# Patient Record
Sex: MALE | Race: WHITE | NOT HISPANIC OR LATINO | Employment: FULL TIME | URBAN - METROPOLITAN AREA
[De-identification: names, ages, dates, MRNs, and addresses within clinical notes are randomized per-mention and may not be internally consistent; named-entity substitution may affect disease eponyms.]

---

## 2017-02-28 ENCOUNTER — COMMUNICATION - HEALTHEAST (OUTPATIENT)
Dept: FAMILY MEDICINE | Facility: CLINIC | Age: 37
End: 2017-02-28

## 2017-03-29 ENCOUNTER — RECORDS - HEALTHEAST (OUTPATIENT)
Dept: ADMINISTRATIVE | Facility: OTHER | Age: 37
End: 2017-03-29

## 2017-05-10 ENCOUNTER — COMMUNICATION - HEALTHEAST (OUTPATIENT)
Dept: FAMILY MEDICINE | Facility: CLINIC | Age: 37
End: 2017-05-10

## 2017-06-14 ENCOUNTER — OFFICE VISIT - HEALTHEAST (OUTPATIENT)
Dept: FAMILY MEDICINE | Facility: CLINIC | Age: 37
End: 2017-06-14

## 2017-06-14 DIAGNOSIS — Z72.0 TOBACCO ABUSE: ICD-10-CM

## 2017-06-14 DIAGNOSIS — F10.11 ALCOHOL ABUSE, IN REMISSION: ICD-10-CM

## 2017-06-14 DIAGNOSIS — I10 ESSENTIAL HYPERTENSION WITH GOAL BLOOD PRESSURE LESS THAN 130/80: ICD-10-CM

## 2017-06-14 DIAGNOSIS — R73.03 PREDIABETES: ICD-10-CM

## 2017-06-14 DIAGNOSIS — E78.00 HYPERCHOLESTEROLEMIA: ICD-10-CM

## 2017-06-14 LAB
CHOLEST SERPL-MCNC: 182 MG/DL
FASTING STATUS PATIENT QL REPORTED: YES
HBA1C MFR BLD: 6.6 % (ref 3.5–6)
HDLC SERPL-MCNC: 37 MG/DL
LDLC SERPL CALC-MCNC: 106 MG/DL
TRIGL SERPL-MCNC: 193 MG/DL

## 2017-06-14 NOTE — ASSESSMENT & PLAN NOTE
New diagnosis since the last time the patient was in clinic.  He is being maintained on Suboxone with good effect.  The mental health component is being handled through weekly counseling and regular attendance at a men's group.

## 2017-06-14 NOTE — ASSESSMENT & PLAN NOTE
The patient declined treatment 19 months ago.  We will recheck his cholesterol levels today as he has not been drinking alcohol for the past 6 months.  Anticipate improvement but I suspect he may have developed diabetes and will still benefit from a cholesterol-lowering medication.

## 2017-06-14 NOTE — ASSESSMENT & PLAN NOTE
Consistently elevated with blood pressurechecks in multiple settings including clinics other than her own.  We will initiate therapy with 10 mg of lisinopril today.  The patient will return for a blood pressure check with nursing staff in 2 weeks.  He will return to see me in 5-6 weeks to talk about side effects and further titration of blood pressure medications.

## 2017-06-14 NOTE — ASSESSMENT & PLAN NOTE
The patient has not been drinking any alcohol for the past 6 months.  We will check a hemoglobin A1c today.

## 2017-06-19 ENCOUNTER — COMMUNICATION - HEALTHEAST (OUTPATIENT)
Dept: FAMILY MEDICINE | Facility: CLINIC | Age: 37
End: 2017-06-19

## 2017-06-19 DIAGNOSIS — R73.03 PREDIABETES: ICD-10-CM

## 2017-06-19 DIAGNOSIS — R73.09 ELEVATED HEMOGLOBIN A1C: ICD-10-CM

## 2017-06-28 ENCOUNTER — COMMUNICATION - HEALTHEAST (OUTPATIENT)
Dept: FAMILY MEDICINE | Facility: CLINIC | Age: 37
End: 2017-06-28

## 2017-06-28 ENCOUNTER — AMBULATORY - HEALTHEAST (OUTPATIENT)
Dept: NURSING | Facility: CLINIC | Age: 37
End: 2017-06-28

## 2017-06-28 DIAGNOSIS — R73.03 PREDIABETES: ICD-10-CM

## 2017-07-25 ENCOUNTER — COMMUNICATION - HEALTHEAST (OUTPATIENT)
Dept: FAMILY MEDICINE | Facility: CLINIC | Age: 37
End: 2017-07-25

## 2017-08-14 ENCOUNTER — COMMUNICATION - HEALTHEAST (OUTPATIENT)
Dept: FAMILY MEDICINE | Facility: CLINIC | Age: 37
End: 2017-08-14

## 2017-08-14 DIAGNOSIS — I10 ESSENTIAL HYPERTENSION WITH GOAL BLOOD PRESSURE LESS THAN 130/80: ICD-10-CM

## 2017-09-25 ENCOUNTER — COMMUNICATION - HEALTHEAST (OUTPATIENT)
Dept: FAMILY MEDICINE | Facility: CLINIC | Age: 37
End: 2017-09-25

## 2017-10-14 ENCOUNTER — COMMUNICATION - HEALTHEAST (OUTPATIENT)
Dept: FAMILY MEDICINE | Facility: CLINIC | Age: 37
End: 2017-10-14

## 2017-10-14 DIAGNOSIS — I10 ESSENTIAL HYPERTENSION WITH GOAL BLOOD PRESSURE LESS THAN 130/80: ICD-10-CM

## 2017-10-17 ENCOUNTER — COMMUNICATION - HEALTHEAST (OUTPATIENT)
Dept: FAMILY MEDICINE | Facility: CLINIC | Age: 37
End: 2017-10-17

## 2017-11-02 ENCOUNTER — COMMUNICATION - HEALTHEAST (OUTPATIENT)
Dept: FAMILY MEDICINE | Facility: CLINIC | Age: 37
End: 2017-11-02

## 2017-11-02 ENCOUNTER — OFFICE VISIT - HEALTHEAST (OUTPATIENT)
Dept: FAMILY MEDICINE | Facility: CLINIC | Age: 37
End: 2017-11-02

## 2017-11-02 DIAGNOSIS — E78.00 HYPERCHOLESTEROLEMIA: ICD-10-CM

## 2017-11-02 DIAGNOSIS — Z23 NEED FOR INFLUENZA VACCINATION: ICD-10-CM

## 2017-11-02 DIAGNOSIS — R73.03 PREDIABETES: ICD-10-CM

## 2017-11-02 DIAGNOSIS — I10 ESSENTIAL HYPERTENSION: ICD-10-CM

## 2017-11-02 DIAGNOSIS — Z72.0 TOBACCO ABUSE: ICD-10-CM

## 2017-11-02 DIAGNOSIS — E88.810 METABOLIC SYNDROME: ICD-10-CM

## 2017-11-02 LAB — HBA1C MFR BLD: 5.5 % (ref 3.5–6)

## 2017-11-02 NOTE — ASSESSMENT & PLAN NOTE
The patient will return to clinic in 6 months for a wellness exam and recheck of laboratory tests.  We will risk stratify him and make recommendation for cholesterol-lowering medications at that time.

## 2017-11-02 NOTE — ASSESSMENT & PLAN NOTE
Dramatic improvement with lifestyle changes in the past 4 months.  He will continue to work at weight loss.

## 2017-11-02 NOTE — ASSESSMENT & PLAN NOTE
This patient has made dramatically still changes.  I do believe that he does fit criteria for metabolic syndrome and would benefit from metformin as well as stained lifestyle changes.  -Start metformin 500 mg.  -Repeat of labs in 6 months.

## 2017-11-02 NOTE — ASSESSMENT & PLAN NOTE
Hypertension is improving with treatment.  Continue current treatment regimen.  Blood pressure will be reassessed at the next regular appointment.  BMP today.

## 2017-11-02 NOTE — ASSESSMENT & PLAN NOTE
The patient continues to chew.  We discussed that this is not advisable.  He will work on this in 2018 as he believes he is quick enough illicit substances in 2017.

## 2018-01-15 ENCOUNTER — COMMUNICATION - HEALTHEAST (OUTPATIENT)
Dept: SCHEDULING | Facility: CLINIC | Age: 38
End: 2018-01-15

## 2018-05-04 ENCOUNTER — COMMUNICATION - HEALTHEAST (OUTPATIENT)
Dept: FAMILY MEDICINE | Facility: CLINIC | Age: 38
End: 2018-05-04

## 2018-05-14 ENCOUNTER — COMMUNICATION - HEALTHEAST (OUTPATIENT)
Dept: FAMILY MEDICINE | Facility: CLINIC | Age: 38
End: 2018-05-14

## 2018-05-14 DIAGNOSIS — R73.03 PREDIABETES: ICD-10-CM

## 2018-05-18 ENCOUNTER — OFFICE VISIT - HEALTHEAST (OUTPATIENT)
Dept: FAMILY MEDICINE | Facility: CLINIC | Age: 38
End: 2018-05-18

## 2018-05-18 DIAGNOSIS — E78.00 HYPERCHOLESTEROLEMIA: ICD-10-CM

## 2018-05-18 DIAGNOSIS — R53.83 FATIGUE: ICD-10-CM

## 2018-05-18 DIAGNOSIS — E55.9 AVITAMINOSIS D: ICD-10-CM

## 2018-05-18 DIAGNOSIS — R73.03 PREDIABETES: ICD-10-CM

## 2018-05-18 DIAGNOSIS — I10 ESSENTIAL HYPERTENSION: ICD-10-CM

## 2018-05-18 DIAGNOSIS — E88.810 METABOLIC SYNDROME: ICD-10-CM

## 2018-05-18 LAB
ANION GAP SERPL CALCULATED.3IONS-SCNC: 12 MMOL/L (ref 5–18)
BUN SERPL-MCNC: 12 MG/DL (ref 8–22)
CALCIUM SERPL-MCNC: 9.5 MG/DL (ref 8.5–10.5)
CHLORIDE BLD-SCNC: 105 MMOL/L (ref 98–107)
CHOLEST SERPL-MCNC: 152 MG/DL
CO2 SERPL-SCNC: 22 MMOL/L (ref 22–31)
CREAT SERPL-MCNC: 0.91 MG/DL (ref 0.7–1.3)
FASTING STATUS PATIENT QL REPORTED: ABNORMAL
GFR SERPL CREATININE-BSD FRML MDRD: >60 ML/MIN/1.73M2
GLUCOSE BLD-MCNC: 117 MG/DL (ref 70–125)
HBA1C MFR BLD: 6.4 % (ref 3.5–6)
HDLC SERPL-MCNC: 34 MG/DL
HGB BLD-MCNC: 14 G/DL (ref 14–18)
LDLC SERPL CALC-MCNC: 93 MG/DL
POTASSIUM BLD-SCNC: 4.5 MMOL/L (ref 3.5–5)
SODIUM SERPL-SCNC: 139 MMOL/L (ref 136–145)
TRIGL SERPL-MCNC: 126 MG/DL

## 2018-05-18 NOTE — ASSESSMENT & PLAN NOTE
Elevated blood pressure checks at his Suboxone clinic.  -Increase lisinopril to 20 mg.  -Check basic metabolic panel.

## 2018-05-18 NOTE — ASSESSMENT & PLAN NOTE
Patient here for diabetic check.  He is fasting today and has been possibly 1 year since his last measurement.  Check lipids today.

## 2018-05-18 NOTE — ASSESSMENT & PLAN NOTE
This patient has prediabetes.  His hemoglobin A1c is unchanged than previous measurements.     - continue medications: oral agent (monotherapy): metformin (generic)   - medications discontinued: none   - aspirin: NO   - blood pressure control: Good   - statin: NO and not indicated given age.   - tobacco use: NO   - physical activity: discussed improving his level of activity   - DM follow-up: 3 months for lab only visit  Follow-up in clinic in 6 months.

## 2018-05-21 LAB
25(OH)D3 SERPL-MCNC: 25.3 NG/ML (ref 30–80)
25(OH)D3 SERPL-MCNC: 25.3 NG/ML (ref 30–80)

## 2018-05-30 ENCOUNTER — OFFICE VISIT - HEALTHEAST (OUTPATIENT)
Dept: OTOLARYNGOLOGY | Facility: CLINIC | Age: 38
End: 2018-05-30

## 2018-05-30 DIAGNOSIS — H65.23 CHRONIC SEROUS OTITIS MEDIA OF BOTH EARS: ICD-10-CM

## 2018-07-30 ENCOUNTER — OFFICE VISIT - HEALTHEAST (OUTPATIENT)
Dept: OTOLARYNGOLOGY | Facility: CLINIC | Age: 38
End: 2018-07-30

## 2018-07-30 DIAGNOSIS — Z45.89 TYMPANOSTOMY TUBE CHECK: ICD-10-CM

## 2018-07-30 DIAGNOSIS — H69.93 EUSTACHIAN TUBE DYSFUNCTION, BILATERAL: ICD-10-CM

## 2018-08-21 ENCOUNTER — COMMUNICATION - HEALTHEAST (OUTPATIENT)
Dept: FAMILY MEDICINE | Facility: CLINIC | Age: 38
End: 2018-08-21

## 2018-11-22 ENCOUNTER — COMMUNICATION - HEALTHEAST (OUTPATIENT)
Dept: FAMILY MEDICINE | Facility: CLINIC | Age: 38
End: 2018-11-22

## 2018-11-24 ENCOUNTER — COMMUNICATION - HEALTHEAST (OUTPATIENT)
Dept: FAMILY MEDICINE | Facility: CLINIC | Age: 38
End: 2018-11-24

## 2018-11-24 DIAGNOSIS — I10 ESSENTIAL HYPERTENSION: ICD-10-CM

## 2018-11-29 ENCOUNTER — COMMUNICATION - HEALTHEAST (OUTPATIENT)
Dept: FAMILY MEDICINE | Facility: CLINIC | Age: 38
End: 2018-11-29

## 2018-11-29 DIAGNOSIS — R73.03 PREDIABETES: ICD-10-CM

## 2019-02-23 ENCOUNTER — COMMUNICATION - HEALTHEAST (OUTPATIENT)
Dept: FAMILY MEDICINE | Facility: CLINIC | Age: 39
End: 2019-02-23

## 2019-03-16 ENCOUNTER — RECORDS - HEALTHEAST (OUTPATIENT)
Dept: ADMINISTRATIVE | Facility: OTHER | Age: 39
End: 2019-03-16

## 2019-03-25 ENCOUNTER — RECORDS - HEALTHEAST (OUTPATIENT)
Dept: ADMINISTRATIVE | Facility: OTHER | Age: 39
End: 2019-03-25

## 2019-03-29 ENCOUNTER — OFFICE VISIT - HEALTHEAST (OUTPATIENT)
Dept: FAMILY MEDICINE | Facility: CLINIC | Age: 39
End: 2019-03-29

## 2019-03-29 DIAGNOSIS — R73.03 PREDIABETES: ICD-10-CM

## 2019-03-29 DIAGNOSIS — R39.9 LOWER URINARY TRACT SYMPTOMS (LUTS): ICD-10-CM

## 2019-03-29 DIAGNOSIS — R73.09 ELEVATED HEMOGLOBIN A1C: ICD-10-CM

## 2019-03-29 DIAGNOSIS — I10 ESSENTIAL HYPERTENSION: ICD-10-CM

## 2019-03-29 LAB
ALBUMIN UR-MCNC: NEGATIVE MG/DL
APPEARANCE UR: CLEAR
BILIRUB UR QL STRIP: NEGATIVE
COLOR UR AUTO: YELLOW
GLUCOSE UR STRIP-MCNC: NEGATIVE MG/DL
HBA1C MFR BLD: 5.9 % (ref 3.5–6)
HGB UR QL STRIP: NEGATIVE
KETONES UR STRIP-MCNC: NEGATIVE MG/DL
LEUKOCYTE ESTERASE UR QL STRIP: NEGATIVE
NITRATE UR QL: NEGATIVE
PH UR STRIP: 6 [PH] (ref 5–8)
PSA SERPL-MCNC: 0.3 NG/ML (ref 0–2.5)
SP GR UR STRIP: 1.02 (ref 1–1.03)
UROBILINOGEN UR STRIP-ACNC: NORMAL

## 2019-03-29 NOTE — ASSESSMENT & PLAN NOTE
Patient continues to work towards losing weight.  Hemoglobin A1c improved in comparison to last year.  We will plan on focusing on weight loss at future visits.  Recheck in 6 months, sooner if needed.

## 2019-03-29 NOTE — ASSESSMENT & PLAN NOTE
Patient is stable with his Suboxone dosing.  He expresses a desire to decrease his dose but believes that the medication continues kartik useful.  He would be interested in transferring his Suboxone management to this clinic.  We will request records from [facility] and the patient will return to clinic in 5 weeks for a follow-up visit.  Encouraged him to follow-up with his clinic at least one more time before transferring care formally.

## 2019-05-09 ENCOUNTER — COMMUNICATION - HEALTHEAST (OUTPATIENT)
Dept: FAMILY MEDICINE | Facility: CLINIC | Age: 39
End: 2019-05-09

## 2019-05-20 ENCOUNTER — OFFICE VISIT - HEALTHEAST (OUTPATIENT)
Dept: FAMILY MEDICINE | Facility: CLINIC | Age: 39
End: 2019-05-20

## 2019-05-20 ENCOUNTER — OFFICE VISIT - HEALTHEAST (OUTPATIENT)
Dept: OTOLARYNGOLOGY | Facility: CLINIC | Age: 39
End: 2019-05-20

## 2019-05-20 DIAGNOSIS — H69.93 EUSTACHIAN TUBE DYSFUNCTION, BILATERAL: ICD-10-CM

## 2019-05-20 DIAGNOSIS — H65.21 RIGHT CHRONIC SEROUS OTITIS MEDIA: ICD-10-CM

## 2019-05-20 DIAGNOSIS — R39.9 LOWER URINARY TRACT SYMPTOMS (LUTS): ICD-10-CM

## 2019-05-20 LAB
AMPHETAMINES UR QL SCN: NORMAL
BARBITURATES UR QL: NORMAL
BENZODIAZ UR QL: NORMAL
BUPRENORPHINE QUAL URINE LHE: ABNORMAL
CANNABINOIDS UR QL SCN: NORMAL
COCAINE UR QL: NORMAL
CREAT UR-MCNC: 368.8 MG/DL
CREAT UR-MCNC: 380.5 MG/DL
OPIATES UR QL SCN: NORMAL
OXYCODONE UR QL: NORMAL
PCP UR QL SCN: NORMAL

## 2019-05-20 NOTE — ASSESSMENT & PLAN NOTE
This patient presents to discuss transfer of Suboxone prescription care from his chemical dependency treatment clinic to his primary care clinic.  He has been on a reasonable dose of buprenorphine-naloxone for the past 13 months.  The dose has been adjusted several times over the course of that time.  He has not used/abused prescription medications since being on therapy.  He is well connected with his nubia community and believes that he has an excellentsocial support.  -Continue Suboxone.  In general he takes 1 film most days with some increased depending on cravings.  We reviewed the [place] prescription monitoring database and there are no unexplained dispenses.  -Drugs of abuse screen today.  - Continue with current group therapy through discharge.  Consider formal chemical dependency referral if the patient has increased cravings.  -We will plan to complete controlled substance agreement and consent for treatment paperwork at our next visit.  - Continue treatment for prostatitis.  If he has recurrent symptoms, referral to urology will be appropriate.

## 2019-05-21 ENCOUNTER — COMMUNICATION - HEALTHEAST (OUTPATIENT)
Dept: FAMILY MEDICINE | Facility: CLINIC | Age: 39
End: 2019-05-21

## 2019-05-22 ENCOUNTER — OFFICE VISIT - HEALTHEAST (OUTPATIENT)
Dept: OTOLARYNGOLOGY | Facility: CLINIC | Age: 39
End: 2019-05-22

## 2019-05-22 DIAGNOSIS — H93.12 TINNITUS OF LEFT EAR: ICD-10-CM

## 2019-05-22 DIAGNOSIS — H69.93 EUSTACHIAN TUBE DYSFUNCTION, BILATERAL: ICD-10-CM

## 2019-05-22 DIAGNOSIS — H65.21 RIGHT CHRONIC SEROUS OTITIS MEDIA: ICD-10-CM

## 2019-06-15 ENCOUNTER — COMMUNICATION - HEALTHEAST (OUTPATIENT)
Dept: FAMILY MEDICINE | Facility: CLINIC | Age: 39
End: 2019-06-15

## 2019-06-15 DIAGNOSIS — I10 ESSENTIAL HYPERTENSION: ICD-10-CM

## 2019-06-17 ENCOUNTER — OFFICE VISIT - HEALTHEAST (OUTPATIENT)
Dept: FAMILY MEDICINE | Facility: CLINIC | Age: 39
End: 2019-06-17

## 2019-06-17 DIAGNOSIS — I10 ESSENTIAL HYPERTENSION: ICD-10-CM

## 2019-06-17 DIAGNOSIS — R73.03 PREDIABETES: ICD-10-CM

## 2019-06-17 DIAGNOSIS — R39.9 LOWER URINARY TRACT SYMPTOMS (LUTS): ICD-10-CM

## 2019-06-17 DIAGNOSIS — E88.810 METABOLIC SYNDROME: ICD-10-CM

## 2019-06-17 LAB
ALT SERPL W P-5'-P-CCNC: 20 U/L (ref 0–45)
ANION GAP SERPL CALCULATED.3IONS-SCNC: 7 MMOL/L (ref 5–18)
BUN SERPL-MCNC: 12 MG/DL (ref 8–22)
CALCIUM SERPL-MCNC: 9.7 MG/DL (ref 8.5–10.5)
CHLORIDE BLD-SCNC: 107 MMOL/L (ref 98–107)
CHOLEST SERPL-MCNC: 164 MG/DL
CO2 SERPL-SCNC: 26 MMOL/L (ref 22–31)
CREAT SERPL-MCNC: 0.9 MG/DL (ref 0.7–1.3)
FASTING STATUS PATIENT QL REPORTED: YES
GFR SERPL CREATININE-BSD FRML MDRD: >60 ML/MIN/1.73M2
GLUCOSE BLD-MCNC: 107 MG/DL (ref 70–125)
HDLC SERPL-MCNC: 44 MG/DL
LDLC SERPL CALC-MCNC: 97 MG/DL
POTASSIUM BLD-SCNC: 4.3 MMOL/L (ref 3.5–5)
SODIUM SERPL-SCNC: 140 MMOL/L (ref 136–145)
TRIGL SERPL-MCNC: 116 MG/DL

## 2019-06-17 ASSESSMENT — MIFFLIN-ST. JEOR: SCORE: 1821.39

## 2019-06-17 NOTE — ASSESSMENT & PLAN NOTE
Currently stable and well maintained on buprenorphine-naloxone.  -Continue Suboxone.  -There is some question of the cost of the urine drug screens.  He is going to check with his insurance company.  Given the stability of his clinical course, will defer urine drug screen this week pending better understanding of the cost to the patient.  -Follow-up in 1 month.

## 2019-06-17 NOTE — ASSESSMENT & PLAN NOTE
The patient has been struggling somewhat in recent weeks/months to maintain his previous discipline with his meal plan.  We discussedstrategies for resuming healthier approach to eating.

## 2019-07-09 ENCOUNTER — OFFICE VISIT - HEALTHEAST (OUTPATIENT)
Dept: FAMILY MEDICINE | Facility: CLINIC | Age: 39
End: 2019-07-09

## 2019-07-09 DIAGNOSIS — R39.9 LOWER URINARY TRACT SYMPTOMS (LUTS): ICD-10-CM

## 2019-07-25 ENCOUNTER — COMMUNICATION - HEALTHEAST (OUTPATIENT)
Dept: FAMILY MEDICINE | Facility: CLINIC | Age: 39
End: 2019-07-25

## 2019-07-29 ENCOUNTER — OFFICE VISIT - HEALTHEAST (OUTPATIENT)
Dept: FAMILY MEDICINE | Facility: CLINIC | Age: 39
End: 2019-07-29

## 2019-07-29 DIAGNOSIS — R39.9 LOWER URINARY TRACT SYMPTOMS (LUTS): ICD-10-CM

## 2019-07-29 LAB
AMPHETAMINE-CLINIC: ABNORMAL
BARBITURATES-CLINIC: ABNORMAL
BENZODIAZEPINES-CLINIC: ABNORMAL
BUPRENORPHINE-CLINIC: ABNORMAL
COCAINE-CLINIC: ABNORMAL
ECSTASY-CLINIC: ABNORMAL
MARIJUANA-CLINIC: ABNORMAL
METHADONE METABOLITE-CLINIC: ABNORMAL
METHAMPHETAMINE-CLINIC: ABNORMAL
OPIATES-CLINIC: ABNORMAL
OXIDANTS: NORMAL
OXYCODONE-CLINIC: ABNORMAL
PCP 25-CLINIC: ABNORMAL
PH-CLINIC: 4 (ref 5–8)
SP GR UR STRIP: 1.02 (ref 1–1.03)

## 2019-07-29 NOTE — ASSESSMENT & PLAN NOTE
Today's visit is earlier than previously negotiated as result of the patient's work/travel schedule and the providers travel schedule.No evidence of misuse or diversion.  We will perform urine drug screen as previously discussed.  Refill of buprenorphine/naloxone sent to pharmacy.  Return to clinic in 4 weeks.

## 2019-08-13 ENCOUNTER — COMMUNICATION - HEALTHEAST (OUTPATIENT)
Dept: FAMILY MEDICINE | Facility: CLINIC | Age: 39
End: 2019-08-13

## 2019-08-13 DIAGNOSIS — R39.9 LOWER URINARY TRACT SYMPTOMS (LUTS): ICD-10-CM

## 2019-08-16 ENCOUNTER — COMMUNICATION - HEALTHEAST (OUTPATIENT)
Dept: FAMILY MEDICINE | Facility: CLINIC | Age: 39
End: 2019-08-16

## 2019-08-16 DIAGNOSIS — R73.03 PREDIABETES: ICD-10-CM

## 2019-08-17 ENCOUNTER — COMMUNICATION - HEALTHEAST (OUTPATIENT)
Dept: SCHEDULING | Facility: CLINIC | Age: 39
End: 2019-08-17

## 2019-08-19 ENCOUNTER — COMMUNICATION - HEALTHEAST (OUTPATIENT)
Dept: FAMILY MEDICINE | Facility: CLINIC | Age: 39
End: 2019-08-19

## 2019-08-19 DIAGNOSIS — R39.9 LOWER URINARY TRACT SYMPTOMS (LUTS): ICD-10-CM

## 2019-08-20 ENCOUNTER — COMMUNICATION - HEALTHEAST (OUTPATIENT)
Dept: FAMILY MEDICINE | Facility: CLINIC | Age: 39
End: 2019-08-20

## 2019-08-29 ENCOUNTER — OFFICE VISIT - HEALTHEAST (OUTPATIENT)
Dept: FAMILY MEDICINE | Facility: CLINIC | Age: 39
End: 2019-08-29

## 2019-08-29 ASSESSMENT — MIFFLIN-ST. JEOR: SCORE: 1847.47

## 2019-08-29 NOTE — ASSESSMENT & PLAN NOTE
Doing well.  The patient and I clarified his dosing and most days he is using 2 films based on his level of cravings.  No evidence ofmisuse or diversion.  No obvious side effects.  We will focus future visit on prediabetes and other lifestyle factors.  For now, continue Suboxone.  We will plan to refill in 3 weeks when he requests a refill.  I will see the patient in clinic in 6-7 weeks for a formal clinic visit.

## 2019-09-16 ENCOUNTER — COMMUNICATION - HEALTHEAST (OUTPATIENT)
Dept: FAMILY MEDICINE | Facility: CLINIC | Age: 39
End: 2019-09-16

## 2019-09-16 DIAGNOSIS — R39.9 LOWER URINARY TRACT SYMPTOMS (LUTS): ICD-10-CM

## 2019-09-17 ENCOUNTER — COMMUNICATION - HEALTHEAST (OUTPATIENT)
Dept: FAMILY MEDICINE | Facility: CLINIC | Age: 39
End: 2019-09-17

## 2019-09-17 DIAGNOSIS — R39.9 LOWER URINARY TRACT SYMPTOMS (LUTS): ICD-10-CM

## 2019-10-07 ENCOUNTER — OFFICE VISIT - HEALTHEAST (OUTPATIENT)
Dept: FAMILY MEDICINE | Facility: CLINIC | Age: 39
End: 2019-10-07

## 2019-10-07 DIAGNOSIS — F19.11 HISTORY OF DRUG ABUSE IN REMISSION (H): ICD-10-CM

## 2019-10-07 LAB
AMPHETAMINES UR QL SCN: NORMAL
BARBITURATES UR QL: NORMAL
BENZODIAZ UR QL: NORMAL
CANNABINOIDS UR QL SCN: NORMAL
COCAINE UR QL: NORMAL
CREAT UR-MCNC: 239.7 MG/DL
OPIATES UR QL SCN: NORMAL
OXYCODONE UR QL: NORMAL
PCP UR QL SCN: NORMAL

## 2019-10-07 NOTE — ASSESSMENT & PLAN NOTE
The patient continues to do well with his current regimen of buprenorphine/naloxone.  There is no evidence of misuse or diversion.  Given his stability over time, there is no indication for additional therapies (chemical dependency/psychology).  CSA updated.  This will be scanned into the medical record today.  -Continue current dose.  -Drugs of abuse screen today.  - We will plan an annual exam with fasting lab work at his next visit.

## 2019-10-10 ENCOUNTER — COMMUNICATION - HEALTHEAST (OUTPATIENT)
Dept: FAMILY MEDICINE | Facility: CLINIC | Age: 39
End: 2019-10-10

## 2019-11-07 ENCOUNTER — COMMUNICATION - HEALTHEAST (OUTPATIENT)
Dept: FAMILY MEDICINE | Facility: CLINIC | Age: 39
End: 2019-11-07

## 2019-11-07 DIAGNOSIS — F19.11 HISTORY OF DRUG ABUSE IN REMISSION (H): ICD-10-CM

## 2019-11-12 ENCOUNTER — COMMUNICATION - HEALTHEAST (OUTPATIENT)
Dept: FAMILY MEDICINE | Facility: CLINIC | Age: 39
End: 2019-11-12

## 2019-12-05 ENCOUNTER — OFFICE VISIT - HEALTHEAST (OUTPATIENT)
Dept: FAMILY MEDICINE | Facility: CLINIC | Age: 39
End: 2019-12-05

## 2019-12-05 DIAGNOSIS — E66.812 CLASS 2 SEVERE OBESITY DUE TO EXCESS CALORIES WITH SERIOUS COMORBIDITY AND BODY MASS INDEX (BMI) OF 35.0 TO 35.9 IN ADULT (H): ICD-10-CM

## 2019-12-05 DIAGNOSIS — E66.01 CLASS 2 SEVERE OBESITY DUE TO EXCESS CALORIES WITH SERIOUS COMORBIDITY AND BODY MASS INDEX (BMI) OF 35.0 TO 35.9 IN ADULT (H): ICD-10-CM

## 2019-12-05 DIAGNOSIS — F19.11 HISTORY OF DRUG ABUSE IN REMISSION (H): ICD-10-CM

## 2019-12-05 NOTE — ASSESSMENT & PLAN NOTE
This patient presents for follow-up.  His most recent urine drug screen was without unexpected abnormalities.  He is tolerant to his current medication.  No evidence of recidivism.  No evidence of side effects.  -Continue buprenorphine/naloxone at current dose.  We will plan a follow-up clinic visit in 2 months.

## 2019-12-08 ENCOUNTER — COMMUNICATION - HEALTHEAST (OUTPATIENT)
Dept: FAMILY MEDICINE | Facility: CLINIC | Age: 39
End: 2019-12-08

## 2019-12-08 DIAGNOSIS — I10 ESSENTIAL HYPERTENSION: ICD-10-CM

## 2019-12-26 ENCOUNTER — COMMUNICATION - HEALTHEAST (OUTPATIENT)
Dept: FAMILY MEDICINE | Facility: CLINIC | Age: 39
End: 2019-12-26

## 2019-12-26 DIAGNOSIS — F19.11 HISTORY OF DRUG ABUSE IN REMISSION (H): ICD-10-CM

## 2019-12-27 ENCOUNTER — COMMUNICATION - HEALTHEAST (OUTPATIENT)
Dept: FAMILY MEDICINE | Facility: CLINIC | Age: 39
End: 2019-12-27

## 2019-12-27 DIAGNOSIS — F19.11 HISTORY OF DRUG ABUSE IN REMISSION (H): ICD-10-CM

## 2020-01-05 ENCOUNTER — COMMUNICATION - HEALTHEAST (OUTPATIENT)
Dept: FAMILY MEDICINE | Facility: CLINIC | Age: 40
End: 2020-01-05

## 2020-01-05 DIAGNOSIS — F19.11 HISTORY OF DRUG ABUSE IN REMISSION (H): ICD-10-CM

## 2020-01-06 ENCOUNTER — COMMUNICATION - HEALTHEAST (OUTPATIENT)
Dept: FAMILY MEDICINE | Facility: CLINIC | Age: 40
End: 2020-01-06

## 2020-01-06 DIAGNOSIS — F19.11 HISTORY OF DRUG ABUSE IN REMISSION (H): ICD-10-CM

## 2020-01-13 ENCOUNTER — OFFICE VISIT - HEALTHEAST (OUTPATIENT)
Dept: FAMILY MEDICINE | Facility: CLINIC | Age: 40
End: 2020-01-13

## 2020-01-13 DIAGNOSIS — F19.11 HISTORY OF DRUG ABUSE IN REMISSION (H): ICD-10-CM

## 2020-01-13 DIAGNOSIS — F11.90 OPIATE USE: ICD-10-CM

## 2020-01-13 DIAGNOSIS — F10.11 ALCOHOL ABUSE, IN REMISSION: ICD-10-CM

## 2020-01-13 LAB
AMPHETAMINES UR QL SCN: NORMAL
BARBITURATES UR QL: NORMAL
BENZODIAZ UR QL: NORMAL
CANNABINOIDS UR QL SCN: NORMAL
COCAINE UR QL: NORMAL
CREAT UR-MCNC: 351.5 MG/DL
OPIATES UR QL SCN: NORMAL
OXYCODONE UR QL: NORMAL
PCP UR QL SCN: NORMAL

## 2020-01-13 NOTE — ASSESSMENT & PLAN NOTE
Symptoms overall are stable.  We met and discussed discrepancies with the dispense dates and the need for an early refill.  I suspectthat he is actually been taking five 1/2 films per day on some days.  We clarified that the expectation is that every 30 days he would go through 60 films.  He verbalized understanding.  Urine drugs of abuse screen will be obtained today.  Otherwise, symptoms are stable.  Return to clinic in 2 months.

## 2020-02-07 ENCOUNTER — COMMUNICATION - HEALTHEAST (OUTPATIENT)
Dept: FAMILY MEDICINE | Facility: CLINIC | Age: 40
End: 2020-02-07

## 2020-02-07 DIAGNOSIS — F19.11 HISTORY OF DRUG ABUSE IN REMISSION (H): ICD-10-CM

## 2020-02-10 ENCOUNTER — COMMUNICATION - HEALTHEAST (OUTPATIENT)
Dept: FAMILY MEDICINE | Facility: CLINIC | Age: 40
End: 2020-02-10

## 2020-02-10 DIAGNOSIS — F19.11 HISTORY OF DRUG ABUSE IN REMISSION (H): ICD-10-CM

## 2020-02-11 ENCOUNTER — COMMUNICATION - HEALTHEAST (OUTPATIENT)
Dept: FAMILY MEDICINE | Facility: CLINIC | Age: 40
End: 2020-02-11

## 2020-03-09 ENCOUNTER — COMMUNICATION - HEALTHEAST (OUTPATIENT)
Dept: FAMILY MEDICINE | Facility: CLINIC | Age: 40
End: 2020-03-09

## 2020-03-09 DIAGNOSIS — F19.11 HISTORY OF DRUG ABUSE IN REMISSION (H): ICD-10-CM

## 2020-03-10 ENCOUNTER — COMMUNICATION - HEALTHEAST (OUTPATIENT)
Dept: FAMILY MEDICINE | Facility: CLINIC | Age: 40
End: 2020-03-10

## 2020-03-10 DIAGNOSIS — F19.11 HISTORY OF DRUG ABUSE IN REMISSION (H): ICD-10-CM

## 2020-03-16 ENCOUNTER — COMMUNICATION - HEALTHEAST (OUTPATIENT)
Dept: FAMILY MEDICINE | Facility: CLINIC | Age: 40
End: 2020-03-16

## 2020-03-17 ENCOUNTER — OFFICE VISIT - HEALTHEAST (OUTPATIENT)
Dept: FAMILY MEDICINE | Facility: CLINIC | Age: 40
End: 2020-03-17

## 2020-03-17 DIAGNOSIS — F19.11 HISTORY OF DRUG ABUSE IN REMISSION (H): ICD-10-CM

## 2020-03-17 NOTE — ASSESSMENT & PLAN NOTE
Doing well.  Reviewed database.  This aligns within a day or two of expectations.  I have no concerns for misuse or diversion.  We willplan for phone call in 6-8 weeks.  Plan for refill

## 2020-04-02 ENCOUNTER — COMMUNICATION - HEALTHEAST (OUTPATIENT)
Dept: FAMILY MEDICINE | Facility: CLINIC | Age: 40
End: 2020-04-02

## 2020-04-03 ENCOUNTER — COMMUNICATION - HEALTHEAST (OUTPATIENT)
Dept: FAMILY MEDICINE | Facility: CLINIC | Age: 40
End: 2020-04-03

## 2020-04-03 DIAGNOSIS — F19.11 HISTORY OF DRUG ABUSE IN REMISSION (H): ICD-10-CM

## 2020-04-07 ENCOUNTER — COMMUNICATION - HEALTHEAST (OUTPATIENT)
Dept: FAMILY MEDICINE | Facility: CLINIC | Age: 40
End: 2020-04-07

## 2020-04-07 DIAGNOSIS — R73.03 PREDIABETES: ICD-10-CM

## 2020-04-08 ENCOUNTER — COMMUNICATION - HEALTHEAST (OUTPATIENT)
Dept: FAMILY MEDICINE | Facility: CLINIC | Age: 40
End: 2020-04-08

## 2020-04-13 ENCOUNTER — COMMUNICATION - HEALTHEAST (OUTPATIENT)
Dept: FAMILY MEDICINE | Facility: CLINIC | Age: 40
End: 2020-04-13

## 2020-04-22 ENCOUNTER — COMMUNICATION - HEALTHEAST (OUTPATIENT)
Dept: FAMILY MEDICINE | Facility: CLINIC | Age: 40
End: 2020-04-22

## 2020-04-22 DIAGNOSIS — I10 ESSENTIAL HYPERTENSION WITH GOAL BLOOD PRESSURE LESS THAN 130/80: ICD-10-CM

## 2020-05-03 ENCOUNTER — COMMUNICATION - HEALTHEAST (OUTPATIENT)
Dept: FAMILY MEDICINE | Facility: CLINIC | Age: 40
End: 2020-05-03

## 2020-05-03 DIAGNOSIS — F19.11 HISTORY OF DRUG ABUSE IN REMISSION (H): ICD-10-CM

## 2020-05-13 ENCOUNTER — COMMUNICATION - HEALTHEAST (OUTPATIENT)
Dept: FAMILY MEDICINE | Facility: CLINIC | Age: 40
End: 2020-05-13

## 2020-06-05 ENCOUNTER — COMMUNICATION - HEALTHEAST (OUTPATIENT)
Dept: FAMILY MEDICINE | Facility: CLINIC | Age: 40
End: 2020-06-05

## 2020-06-05 DIAGNOSIS — F19.11 HISTORY OF DRUG ABUSE IN REMISSION (H): ICD-10-CM

## 2020-06-08 ENCOUNTER — COMMUNICATION - HEALTHEAST (OUTPATIENT)
Dept: FAMILY MEDICINE | Facility: CLINIC | Age: 40
End: 2020-06-08

## 2020-06-17 ENCOUNTER — OFFICE VISIT - HEALTHEAST (OUTPATIENT)
Dept: AUDIOLOGY | Facility: CLINIC | Age: 40
End: 2020-06-17

## 2020-06-17 ENCOUNTER — COMMUNICATION - HEALTHEAST (OUTPATIENT)
Dept: FAMILY MEDICINE | Facility: CLINIC | Age: 40
End: 2020-06-17

## 2020-06-17 DIAGNOSIS — H90.A31 MIXED CONDUCTIVE AND SENSORINEURAL HEARING LOSS OF RIGHT EAR WITH RESTRICTED HEARING OF LEFT EAR: ICD-10-CM

## 2020-06-17 DIAGNOSIS — Z86.69 HISTORY OF OTITIS MEDIA: ICD-10-CM

## 2020-06-19 ENCOUNTER — OFFICE VISIT - HEALTHEAST (OUTPATIENT)
Dept: OTOLARYNGOLOGY | Facility: CLINIC | Age: 40
End: 2020-06-19

## 2020-06-19 DIAGNOSIS — H69.93 EUSTACHIAN TUBE DYSFUNCTION, BILATERAL: ICD-10-CM

## 2020-06-24 ENCOUNTER — COMMUNICATION - HEALTHEAST (OUTPATIENT)
Dept: FAMILY MEDICINE | Facility: CLINIC | Age: 40
End: 2020-06-24

## 2020-06-25 ENCOUNTER — COMMUNICATION - HEALTHEAST (OUTPATIENT)
Dept: FAMILY MEDICINE | Facility: CLINIC | Age: 40
End: 2020-06-25

## 2020-06-25 ENCOUNTER — OFFICE VISIT - HEALTHEAST (OUTPATIENT)
Dept: FAMILY MEDICINE | Facility: CLINIC | Age: 40
End: 2020-06-25

## 2020-06-25 DIAGNOSIS — F10.11 ALCOHOL ABUSE, IN REMISSION: ICD-10-CM

## 2020-06-25 DIAGNOSIS — F19.11 HISTORY OF DRUG ABUSE IN REMISSION (H): ICD-10-CM

## 2020-06-25 NOTE — ASSESSMENT & PLAN NOTE
Doing well.  No concerns for misuse or diversion. We will plan for clinic visit in ~6-8 weeks with urine tox screen.  Plan for visit atthe end of August.   - face to face visit in ~5-6 weeks.

## 2020-06-29 ENCOUNTER — COMMUNICATION - HEALTHEAST (OUTPATIENT)
Dept: FAMILY MEDICINE | Facility: CLINIC | Age: 40
End: 2020-06-29

## 2020-07-06 ENCOUNTER — COMMUNICATION - HEALTHEAST (OUTPATIENT)
Dept: FAMILY MEDICINE | Facility: CLINIC | Age: 40
End: 2020-07-06

## 2020-07-06 DIAGNOSIS — F19.11 HISTORY OF DRUG ABUSE IN REMISSION (H): ICD-10-CM

## 2020-08-02 ENCOUNTER — COMMUNICATION - HEALTHEAST (OUTPATIENT)
Dept: FAMILY MEDICINE | Facility: CLINIC | Age: 40
End: 2020-08-02

## 2020-08-02 DIAGNOSIS — F19.11 HISTORY OF DRUG ABUSE IN REMISSION (H): ICD-10-CM

## 2020-08-29 ENCOUNTER — COMMUNICATION - HEALTHEAST (OUTPATIENT)
Dept: FAMILY MEDICINE | Facility: CLINIC | Age: 40
End: 2020-08-29

## 2020-08-29 DIAGNOSIS — F19.11 HISTORY OF DRUG ABUSE IN REMISSION (H): ICD-10-CM

## 2020-09-24 ENCOUNTER — COMMUNICATION - HEALTHEAST (OUTPATIENT)
Dept: FAMILY MEDICINE | Facility: CLINIC | Age: 40
End: 2020-09-24

## 2020-09-24 DIAGNOSIS — F19.11 HISTORY OF DRUG ABUSE IN REMISSION (H): ICD-10-CM

## 2020-09-28 ENCOUNTER — COMMUNICATION - HEALTHEAST (OUTPATIENT)
Dept: FAMILY MEDICINE | Facility: CLINIC | Age: 40
End: 2020-09-28

## 2020-10-05 ENCOUNTER — OFFICE VISIT - HEALTHEAST (OUTPATIENT)
Dept: FAMILY MEDICINE | Facility: CLINIC | Age: 40
End: 2020-10-05

## 2020-10-05 DIAGNOSIS — E88.810 METABOLIC SYNDROME: ICD-10-CM

## 2020-10-05 DIAGNOSIS — R39.9 LOWER URINARY TRACT SYMPTOMS (LUTS): ICD-10-CM

## 2020-10-05 DIAGNOSIS — I10 ESSENTIAL HYPERTENSION: ICD-10-CM

## 2020-10-05 DIAGNOSIS — F19.11 HISTORY OF DRUG ABUSE IN REMISSION (H): ICD-10-CM

## 2020-10-05 DIAGNOSIS — R73.03 PREDIABETES: ICD-10-CM

## 2020-10-05 LAB
AMPHETAMINES UR QL SCN: NORMAL
ANION GAP SERPL CALCULATED.3IONS-SCNC: 10 MMOL/L (ref 5–18)
BARBITURATES UR QL: NORMAL
BENZODIAZ UR QL: NORMAL
BUN SERPL-MCNC: 10 MG/DL (ref 8–22)
CALCIUM SERPL-MCNC: 9.4 MG/DL (ref 8.5–10.5)
CANNABINOIDS UR QL SCN: NORMAL
CHLORIDE BLD-SCNC: 100 MMOL/L (ref 98–107)
CO2 SERPL-SCNC: 25 MMOL/L (ref 22–31)
COCAINE UR QL: NORMAL
CREAT SERPL-MCNC: 0.94 MG/DL (ref 0.7–1.3)
CREAT UR-MCNC: 166.4 MG/DL
GFR SERPL CREATININE-BSD FRML MDRD: >60 ML/MIN/1.73M2
GLUCOSE BLD-MCNC: 258 MG/DL (ref 70–125)
OPIATES UR QL SCN: NORMAL
OXYCODONE UR QL: NORMAL
PCP UR QL SCN: NORMAL
POTASSIUM BLD-SCNC: 4.5 MMOL/L (ref 3.5–5)
PSA SERPL-MCNC: 0.2 NG/ML (ref 0–2.5)
SODIUM SERPL-SCNC: 135 MMOL/L (ref 136–145)

## 2020-10-05 NOTE — ASSESSMENT & PLAN NOTE
This patient's hypertension In goal.   - lifestyle: controlled.  Discussed weight  - cause of hypertension:  Primary Hypertension  - medicaton side effects: no medication side effects noted  - medications: no change  - labs today: basic metabolic panel  - tobacco:     Tobacco Use      Smoking status: Former Smoker        Packs/day: 1.00        Types: Cigarettes      Smokeless tobacco: Current User        Types: Chew    - statin indicated: No

## 2020-10-05 NOTE — ASSESSMENT & PLAN NOTE
Doing well.  No evidence of misuse or diversion.  Urine drug screen today.  Controlled substance agreement completed today.  Follow-upin 2 months.

## 2020-10-06 LAB — HBA1C MFR BLD: 8.2 %

## 2020-10-26 ENCOUNTER — COMMUNICATION - HEALTHEAST (OUTPATIENT)
Dept: FAMILY MEDICINE | Facility: CLINIC | Age: 40
End: 2020-10-26

## 2020-10-26 DIAGNOSIS — F19.11 HISTORY OF DRUG ABUSE IN REMISSION (H): ICD-10-CM

## 2020-11-14 ENCOUNTER — COMMUNICATION - HEALTHEAST (OUTPATIENT)
Dept: FAMILY MEDICINE | Facility: CLINIC | Age: 40
End: 2020-11-14

## 2020-11-14 DIAGNOSIS — R73.03 PREDIABETES: ICD-10-CM

## 2020-11-22 ENCOUNTER — COMMUNICATION - HEALTHEAST (OUTPATIENT)
Dept: FAMILY MEDICINE | Facility: CLINIC | Age: 40
End: 2020-11-22

## 2020-11-22 DIAGNOSIS — F19.11 HISTORY OF DRUG ABUSE IN REMISSION (H): ICD-10-CM

## 2020-12-20 ENCOUNTER — COMMUNICATION - HEALTHEAST (OUTPATIENT)
Dept: FAMILY MEDICINE | Facility: CLINIC | Age: 40
End: 2020-12-20

## 2020-12-20 DIAGNOSIS — I10 ESSENTIAL HYPERTENSION: ICD-10-CM

## 2020-12-20 DIAGNOSIS — F19.11 HISTORY OF DRUG ABUSE IN REMISSION (H): ICD-10-CM

## 2021-01-07 ENCOUNTER — OFFICE VISIT - HEALTHEAST (OUTPATIENT)
Dept: FAMILY MEDICINE | Facility: CLINIC | Age: 41
End: 2021-01-07

## 2021-01-07 DIAGNOSIS — E11.65 TYPE 2 DIABETES MELLITUS WITH HYPERGLYCEMIA, WITHOUT LONG-TERM CURRENT USE OF INSULIN (H): ICD-10-CM

## 2021-01-07 DIAGNOSIS — E66.01 MORBID OBESITY (H): ICD-10-CM

## 2021-01-07 DIAGNOSIS — R73.03 PREDIABETES: ICD-10-CM

## 2021-01-07 DIAGNOSIS — F19.11 HISTORY OF DRUG ABUSE IN REMISSION (H): ICD-10-CM

## 2021-01-07 DIAGNOSIS — E88.810 METABOLIC SYNDROME X: ICD-10-CM

## 2021-01-07 LAB
ALT SERPL W P-5'-P-CCNC: 28 U/L (ref 0–45)
ANION GAP SERPL CALCULATED.3IONS-SCNC: 11 MMOL/L (ref 5–18)
BUN SERPL-MCNC: 12 MG/DL (ref 8–22)
CALCIUM SERPL-MCNC: 8.7 MG/DL (ref 8.5–10.5)
CHLORIDE BLD-SCNC: 103 MMOL/L (ref 98–107)
CO2 SERPL-SCNC: 25 MMOL/L (ref 22–31)
CREAT SERPL-MCNC: 0.89 MG/DL (ref 0.7–1.3)
CREAT UR-MCNC: 234.3 MG/DL
GFR SERPL CREATININE-BSD FRML MDRD: >60 ML/MIN/1.73M2
GLUCOSE BLD-MCNC: 229 MG/DL (ref 70–125)
HBA1C MFR BLD: 8.1 %
MICROALBUMIN UR-MCNC: 1.5 MG/DL (ref 0–1.99)
MICROALBUMIN/CREAT UR: 6.4 MG/G
POTASSIUM BLD-SCNC: 4.2 MMOL/L (ref 3.5–5)
SODIUM SERPL-SCNC: 139 MMOL/L (ref 136–145)
URATE SERPL-MCNC: 5.4 MG/DL (ref 3–8)

## 2021-01-07 ASSESSMENT — MIFFLIN-ST. JEOR: SCORE: 1888.3

## 2021-01-07 NOTE — ASSESSMENT & PLAN NOTE
Suboxone continues to be helpful in controlling this patient's cravings.  No obvious side effects (weight gain?).  I think he has somesymptoms of replacing substance abuse with food which may be contributory towards his new diagnosis of diabetes.  No evidence of misuse or diversion.  We will plan to continue current dose.  2-month clinic visit recommended.   Abdomen soft, non-tender, no guarding.

## 2021-01-07 NOTE — ASSESSMENT & PLAN NOTE
Hemoglobin A1c remains elevated.  Diagnosis of type 2 diabetes discussed today.  We reviewed that this is an intolerance to carbohydrates in processed food.  The patient is aware that this is a consequence of the low quality diet he is currently consuming.  He has not eaten well for the majority of his life although he says that he does like some foods that he considers healthy.  He has an 18-year-old son we states eats incredibly healthy and that would help him improve nutrition.  -Increase Metformin.  -We discussed dietary framework to address insulin resistance, excessive calories.  I suggested a high fat, low carbohydrate framework and he is willing to try.  He will also introduce some element of time restricted eating.  -Patient refused glucometer.  Will consider at future time.  -Discussed need to discontinue tobacco use (chew)  -Start aspirin  -Start low-dose statin  -We will plan to get fasting lab work in the near future  -2-month follow-up recommended to review progress.  We will base additional recommendations on weight, adherence to dietary principles discussed today.  Consider adding GLP-1 receptor agonist (Ozempic?).

## 2021-01-19 ENCOUNTER — COMMUNICATION - HEALTHEAST (OUTPATIENT)
Dept: FAMILY MEDICINE | Facility: CLINIC | Age: 41
End: 2021-01-19

## 2021-01-19 DIAGNOSIS — F19.11 HISTORY OF DRUG ABUSE IN REMISSION (H): ICD-10-CM

## 2021-02-17 ENCOUNTER — COMMUNICATION - HEALTHEAST (OUTPATIENT)
Dept: FAMILY MEDICINE | Facility: CLINIC | Age: 41
End: 2021-02-17

## 2021-02-17 DIAGNOSIS — F19.11 HISTORY OF DRUG ABUSE IN REMISSION (H): ICD-10-CM

## 2021-03-17 ENCOUNTER — COMMUNICATION - HEALTHEAST (OUTPATIENT)
Dept: FAMILY MEDICINE | Facility: CLINIC | Age: 41
End: 2021-03-17

## 2021-03-17 DIAGNOSIS — F19.11 HISTORY OF DRUG ABUSE IN REMISSION (H): ICD-10-CM

## 2021-03-18 ENCOUNTER — COMMUNICATION - HEALTHEAST (OUTPATIENT)
Dept: FAMILY MEDICINE | Facility: CLINIC | Age: 41
End: 2021-03-18

## 2021-03-25 ENCOUNTER — OFFICE VISIT - HEALTHEAST (OUTPATIENT)
Dept: FAMILY MEDICINE | Facility: CLINIC | Age: 41
End: 2021-03-25

## 2021-03-25 DIAGNOSIS — F19.11 HISTORY OF DRUG ABUSE IN REMISSION (H): ICD-10-CM

## 2021-03-25 DIAGNOSIS — E11.65 TYPE 2 DIABETES MELLITUS WITH HYPERGLYCEMIA, WITHOUT LONG-TERM CURRENT USE OF INSULIN (H): ICD-10-CM

## 2021-03-25 DIAGNOSIS — I10 ESSENTIAL HYPERTENSION: ICD-10-CM

## 2021-03-25 DIAGNOSIS — E78.00 HYPERCHOLESTEROLEMIA: ICD-10-CM

## 2021-03-25 LAB
AMPHETAMINE-CLINIC: ABNORMAL
BARBITURATES-CLINIC: ABNORMAL
BENZODIAZEPINES-CLINIC: ABNORMAL
BUPRENORPHINE-CLINIC: ABNORMAL
COCAINE-CLINIC: ABNORMAL
ECSTASY-CLINIC: ABNORMAL
MARIJUANA-CLINIC: ABNORMAL
METHADONE METABOLITE-CLINIC: ABNORMAL
METHAMPHETAMINE-CLINIC: ABNORMAL
OPIATES-CLINIC: ABNORMAL
OXIDANTS: NORMAL
OXYCODONE-CLINIC: ABNORMAL
PCP 25-CLINIC: ABNORMAL
PH-CLINIC: 4 (ref 5–8)
SP GR UR STRIP: 1.01 (ref 1–1.03)

## 2021-03-25 NOTE — ASSESSMENT & PLAN NOTE
The patient has been working to reduce his blood sugars.  Primarily, he is practicing fasting and overall reduction in calories.  He is also eating more food prepared in the home.  It is premature to check a hemoglobin A1c and he does not check his blood sugars.  We discussed that his efforts might be easier with medication such as semaglutide.  He declines at this time.  I also offered him a continuous glucose monitor (which would be paid out of pocket) to help with food selection decision making.  He declines.  We will plan on completing a hemoglobin A1c in about amonth and then following up with a video visit in 2 months to discuss progress.  Blood pressures well controlled.  He is taking an aspirin.  He is also taking a statin.  Anticipate improvement with his hemoglobin A1c when it is measured in the near future.

## 2021-03-25 NOTE — ASSESSMENT & PLAN NOTE
Doing well on Suboxone.  No evidence of misuse or diversion.  No side effects.  Continue current therapy.  Drugs of abuse screen todayconsistent with buprenorphine use.  We will decrease the frequency of visits.  Follow-up via video in 2 months and face-to-face in 4 months.

## 2021-04-14 ENCOUNTER — COMMUNICATION - HEALTHEAST (OUTPATIENT)
Dept: FAMILY MEDICINE | Facility: CLINIC | Age: 41
End: 2021-04-14

## 2021-04-14 DIAGNOSIS — F19.11 HISTORY OF DRUG ABUSE IN REMISSION (H): ICD-10-CM

## 2021-05-12 ENCOUNTER — COMMUNICATION - HEALTHEAST (OUTPATIENT)
Dept: FAMILY MEDICINE | Facility: CLINIC | Age: 41
End: 2021-05-12

## 2021-05-12 DIAGNOSIS — F19.11 HISTORY OF DRUG ABUSE IN REMISSION (H): ICD-10-CM

## 2021-05-24 ENCOUNTER — OFFICE VISIT - HEALTHEAST (OUTPATIENT)
Dept: FAMILY MEDICINE | Facility: CLINIC | Age: 41
End: 2021-05-24

## 2021-05-24 DIAGNOSIS — F19.20 DRUG DEPENDENCE ON MAINTENANCE AGONIST THERAPY, NO SYMPTOMS (H): ICD-10-CM

## 2021-05-24 DIAGNOSIS — F10.21 ALCOHOL DEPENDENCE IN REMISSION (H): ICD-10-CM

## 2021-05-24 NOTE — ASSESSMENT & PLAN NOTE
Doing well.  Symptoms controlled with agonist therapy.  No side effects.  Bowel movements have been stable.  No changes to plan recommended today.  He will have a face-to-face visit in roughly 1 month.  Urine drug screen will be performed on that day.  Due for diabetic checkup as well which will be completed on that day.

## 2021-05-27 NOTE — PROGRESS NOTES
"Assessment/Plan:    Lower urinary tract symptoms (LUTS)  1 year of obstructive symptoms of the lower urinary tract.  No family history of prostate cancer.  No personal history of prostate cancer or PSA testing.  His symptoms correspond roughly to starting Suboxone through a local chemical dependency program approximately 12 months ago.  He is tender to palpation of the prostate and I suspect that he has chronic prostatitis with obstructive symptoms.  We will treat empirically.  Urinalysis was without abnormalities.  If he has improvement, will presume this diagnosis is correct.  If he does not have improvement or has a markedly elevated PSA will consider referral to urology.  Patient will follow-up in 4-5 weeks to discuss symptoms.  He does not believe he is been at risk for gonorrhea and chlamydia.    Prediabetes  Patient continues to work towards losing weight.  Hemoglobin A1c improved in comparison to last year.  We will plan on focusing on weight loss at future visits.  Recheck in 6 months, sooner if needed.    History of prescription drug abuse (H)  Patient is stable with his Suboxone dosing.  He expresses a desire to decrease his dose but believes that the medication continues to be useful.  He would be interested in transferring his Suboxone management to this clinic.  We will request records from [facility] and the patient will return to clinic in 5 weeks for a follow-up visit.  Encouraged him to follow-up with his clinic at least one more time before transferring care formally.    Return in about 5 weeks (around 5/3/2019) for recheck follow-up visit.    Demetris Osman MD  _______________________________    Chief Complaint   Patient presents with     Follow-up     pre-diabetes     Questions For Providers/results     pt c/o he \" can not empty my bladder\" concerned about colon issues x 1 year      Subjective: Omero Mota is a 38 y.o. year old male who returns to clinic for the following chronic " "complaints/concerns:     Prediabetes:   - intermittent fasting   - hunger in the afternoon.  6:18 ratio.     -Feels good overall.  Happy that he continues to lose weight.    Dysuria:   - the patient is concerned about poor colon function.  Urinary dribble. New.  Nocturia.  Sense of incomplete voiding.  BM: normal.  Once a day. \"hard.\"  Previously soft.  Parke type #3.    Mood: more cravings for pills.  Continues anti-depressants.  Divorce lingers. He has met with counseling in the past.  \"bad kid.\"  He does not endorse depression.  Anhedonic?  Energy: \"not great.\"  Enjoys kids.  No alcohol.  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  Active in [employer].     Review of systems is negative except for as shown in the HPI.    The following portions of the patient's history were reviewed and updated as appropriate: allergies, current medications, past medical history, past social history and problem list.    Objective:    weight is 213 lb (96.6 kg). His oral temperature is 97.5  F (36.4  C). His blood pressure is 118/70 and his pulse is 66.   General: No acute distress  Psych: Normal affect.  Normal speech per no tangentiality.  Neatly dressed.  Rectal exam: The patient has a large asymmetric prostate which is tender to palpation.  It is larger on the right side than the left side.  No fecal impaction.  Anal tone is normal.    Recent Results (from the past 24 hour(s))   Glycosylated Hemoglobin A1c   Result Value Ref Range    Hemoglobin A1c 5.9 3.5 - 6.0 %   Urinalysis-UC if Indicated   Result Value Ref Range    Color, UA Yellow Colorless, Yellow, Straw, Light Yellow    Clarity, UA Clear Clear    Glucose, UA Negative Negative    Bilirubin, UA Negative Negative    Ketones, UA Negative Negative    Specific Gravity, UA 1.020 1.005 - 1.030    Blood, UA Negative Negative    pH, UA 6.0 5.0 - 8.0    Protein, UA Negative Negative mg/dL    Urobilinogen, UA 0.2 E.U./dL 0.2 E.U./dL, 1.0 E.U./dL    Nitrite, UA Negative Negative    Leukocytes, UA " Negative Negative     Additional History from Old Records Summarized (2): no  Decision to Obtain Records (1): yes  Radiology Tests Summarized or Ordered (1): no  Labs Reviewed or Ordered (1): yes  Medicine Test Summarized or Ordered (1): no  Independent Review of EKG or X-RAY(2 each): no    This note has been dictated using voice recognition software. Any grammatical or context distortions are unintentional and inherent to the software

## 2021-05-28 NOTE — TELEPHONE ENCOUNTER
I called pt back, he states his symptoms have not improved and that he still has his antibiotic from his appt on 03/29/2019 of Cipro, he is asking can he still take it? I offered him an appointment to discuss his symptoms and he declined    Thank you,  Genevieve Gutierrez LPN

## 2021-05-28 NOTE — TELEPHONE ENCOUNTER
Who is calling:  The patient  Reason for Call:  The patient would like a phone call back from Dr. Patino nurse. The patient patient has questions about a historical lab and medication.  Date of last appointment with primary care: 3/29/2019  Okay to leave a detailed message: No

## 2021-05-28 NOTE — TELEPHONE ENCOUNTER
Per Dr. [place] sharp to start medication and follow up if no improvement.    Genevieve Gutierrez LPN

## 2021-05-28 NOTE — PROGRESS NOTES
"HPI: This patient is a 37 yo who presents to clinic for evaluation of the ears.  He has long history of ETD and chronic serous otitis media.  Has had multiple sets of PE tubes placed; last set 5/2018 by Dr. Shelton.  Right ear tube felt out fairly quickly and he had a follow-up with Dr. Shelton 7/2018 at which time left PE tube still in place and right ear exam normal.  Recommend to follow-up if fluid returns.  Patient presents today complaining of recurrence of fluid.  He had an \"ear infection\" a couple weeks ago at which time he had otalgia and otorrhea.  This resolved on its own.  Still with muffled hearing and plugged sensation in right ear.  Has tried the balloon surgery 5-6 years ago without success. No other health concerns at this time.    Past medical history, surgical history, social history, family history, medications, and allergies have been reviewed with the patient and are documented above.    Review of Systems: a 10-system review was performed. Pertinent positives are noted in the HPI and on a separate scanned document in the chart.    PHYSICAL EXAMINATION:  GEN: no acute distress, normocephalic  EYES: extraocular movements are intact, pupils are equal and round. Sclera clear.   EARS: auricles are normally formed. The external auditory canals are clear with minimal to no cerumen. Right TM retracted with evidence of serous fluid.  Left TM intact with patent PE tube in place. No evidence of fluid.  NOSE: anterior nares are patent.  NECK: Airway is midline.  NEURO: CN VII and XII intact and symmetric. alert and interactive. No nystagmus.   PULM: breathing comfortably on room air, normal chest expansion with respiration  HEART: no clubbing or cyanosis, no peripheral edema    MEDICAL DECISION-MAKING: Omero is a 37 y/o with ETD and right serous effusion who would benefit from PE tube placement.  Will have patient follow-up with my colleague Dr. Carreon for right PE tube placement and further assessment. " Reassured patient his left PE tube is open and in place.  He is with good understanding and in agreement of plan.

## 2021-05-28 NOTE — PROGRESS NOTES
Per [place] database:    Last fill    Suboxone    03/25/2019  #45  02/25/2019  #  38  01/28/2019  #38    Pt also filled    Buprenorp-Nalox 8-2 Mg Sl Film 04/25/2019  #45  No other meds noted  No fills noted in [place]    Genevieve Gutierrez LPN

## 2021-05-28 NOTE — TELEPHONE ENCOUNTER
Left message to call back for: Omero   Information to relay to patient:  See message below, pt may start antibiotic and is to follow up if symptoms are not improving.    Thank you,  Genevieve Gutierrez LPN

## 2021-05-28 NOTE — PROGRESS NOTES
"Assessment/Plan:    History of prescription drug abuse (H)  This patient presents to discuss transfer of Suboxone prescription care from his chemical dependency treatment clinic to his primary care clinic.  He has been on a reasonable dose of buprenorphine-naloxone for the past 13 months.  The dose has been adjusted several times over the course of that time.  He has not used/abused prescription medications since being on therapy.  He is well connected with his nubia community and believes that he has an excellent social support.  -Continue Suboxone.  In general he takes 1 film most days with some increased depending on cravings.  We reviewed the [place] prescription monitoring database and there are no unexplained dispenses.  -Drugs of abuse screen today.  - Continue with current group therapy through discharge.  Consider formal chemical dependency referral if the patient has increased cravings.  -We will plan to complete controlled substance agreement and consent for treatment paperwork at our next visit.  - Continue treatment for prostatitis.  If he has recurrent symptoms, referral to urology will be appropriate.    Return in about 1 month (around 6/17/2019) for recheck follow-up visit.    Demetris Osman MD  _______________________________    Chief Complaint   Patient presents with     Follow-up     \"prostate issues\" pt taking Cipro      Questions For Providers/results     should he continue to take high dose Vitamin D      Subjective: Omero Mota is a 38 y.o. year old male who returns to clinic for the following chronic complaints/concerns:     Chem Dep:   - he is looking to transfer his suboxone Rx to our clinic.     - he has not used Rx meds inappropriately in over a year.   - He has been on suboxone for 13+ months.  Cravings were increased over the winter months.     - he is in a divorce care small group.   - no AA.  No EtOH for the past year.  \"I get ill.\"  - strong local support. Takes care of " kids.  Good relationship with ex-wife.    - he wonders about co-morbid depression.     Prostate: on cipro for the past week.  Seems to be getting better.  No nocturia.  Taking medication as prescribed.  Bowel movements continue to be firm but overall better as he is tried to adjust his diet.    Review of systems is negative except for as shown in the HPI.    The following portions of the patient's history were reviewed and updated as appropriate: allergies, current medications, past medical history, past social history and problem list.    Objective:    weight is 209 lb (94.8 kg). His oral temperature is 97.9  F (36.6  C). His blood pressure is 120/60 and his pulse is 80. His respiration is 20 and oxygen saturation is 98%.   General: No acute distress  Psych: Normal affect.  Normal rate of speech.  No tangentiality.  Denies suicidality.  Thinking is logical.  Denies hallucinations.     No results found for this or any previous visit (from the past 24 hour(s)).    Records from [facility] reviewed.      Additional History from Old Records Summarized (2): yes  Decision to Obtain Records (1): no  Radiology Tests Summarized or Ordered (1): no  Labs Reviewed or Ordered (1): yes  Medicine Test Summarized or Ordered (1): no  Independent Review of EKG or X-RAY(2 each): no    This note has been dictated using voice recognition software. Any grammatical or context distortions are unintentional and inherent to the software

## 2021-05-28 NOTE — TELEPHONE ENCOUNTER
Patient Returning Call  Reason for call:  Patient returning call to the clinic.  Information relayed to patient:  Yes as instructed and patient agrees with plan.  Patient has additional questions:  No  If YES, what are your questions/concerns:  none  Okay to leave a detailed message?: Yes

## 2021-05-29 NOTE — PROGRESS NOTES
HPI:  Has seen Dr. Shelton and Federico Fuentes most recently for chronic ETD and serous effusion.  Has a tube on the left and no tube recently on the right.  He has recently had decreased hearing on the right.  Has history of balloon eustachain tuboplasty by Dr. Hagen 8 years ago wih no benefit.  Note he has had chronic left greater than right tinnitus.      Past medical history, surgical history, social history, family history, medications, and allergies have been reviewed with the patient and are documented above.    Review of Systems: a 10-system review was performed. Pertinent positives are noted in the HPI and on a separate scanned document in the chart.    PHYSICAL EXAMINATION:  GEN: no acute distress, normocephalic  EYES: extraocular movements are intact, pupils are equal and round. Sclera clear.   EARS\NEURO: CN II-XII are intact bilaterally. alert and oriented. No nystagmus. Gait is normal.  PULM: breathing comfortably on room air, normal chest expansion with respiration  HEART: regular rate and rhythm, no peripheral edema    PROCEDURE:  The risks, benefits and alternatives to right tube insertion is discussed.  He indicated understanding and wished to proceed.  The righ ear is examined under the microscope and the drum is noted to be fairly retracted, promontory pexy present, and fluid visible in the middle ear space.  Phenol is applied to the inferior aspect of the drum and a myringotomy is performed.  Fluid is aspirated from the middle ear space and a Triune tube is inserted.  Drops are instilled.  The patient tolerated the procedure well.    MEDICAL DECISION-MAKING: Tube placed.   Discussed pathophysiology, masking techniques and triggers for tinnitus.  We discussed current guidelines in regards to approach to tinnitus.  Unfortunately there are not many satisfying answers but working on triggers may be helpful. Follow up in 6 months.

## 2021-05-29 NOTE — TELEPHONE ENCOUNTER
RN cannot approve Refill Request    RN can NOT refill this medication PCP messaged that patient is overdue for Labs. Last office visit: 5/20/2019 Demetris Osman MD Last Physical: 12/1/2015 Last MTM visit: Visit date not found Last visit same specialty: 5/20/2019 Demetris Osman MD.  Next visit within 3 mo: Visit date not found  Next physical within 3 mo: Visit date not found      Kody Vaughn, [facility] Triage/Med Refill 6/15/2019    Requested Prescriptions   Pending Prescriptions Disp Refills     lisinopril (PRINIVIL,ZESTRIL) 20 MG tablet [Pharmacy Med Name: LISINOPRIL 20 MG TABLET] 90 tablet 1     Sig: TAKE 1 TABLET BY MOUTH EVERY DAY       Ace Inhibitors Refill Protocol Failed - 6/15/2019 12:21 AM        Failed - Serum Potassium in past 12 months     No results found for: LN-POTASSIUM          Failed - Serum Creatinine in past 12 months     Creatinine   Date Value Ref Range Status   05/18/2018 0.91 0.70 - 1.30 mg/dL Final             Passed - PCP or prescribing provider visit in past 12 months       Last office visit with prescriber/PCP: 5/20/2019 Demetris Osman MD OR same dept: 5/20/2019 Demetris Osman MD OR same specialty: 5/20/2019 Demetris Osman MD  Last physical: 12/1/2015 Last MTM visit: Visit date not found   Next visit within 3 mo: Visit date not found  Next physical within 3 mo: Visit date not found  Prescriber OR PCP: Demetris Osman MD  Last diagnosis associated with med order: 1. Essential hypertension  - lisinopril (PRINIVIL,ZESTRIL) 20 MG tablet [Pharmacy Med Name: LISINOPRIL 20 MG TABLET]; TAKE 1 TABLET BY MOUTH EVERY DAY  Dispense: 90 tablet; Refill: 1    If protocol passes may refill for 12 months if within 3 months of last provider visit (or a total of 15 months).             Passed - Blood pressure filed in past 12 months     BP Readings from Last 1 Encounters:   05/20/19 120/60

## 2021-05-29 NOTE — PROGRESS NOTES
[place]:    Last [place]    05/20/2019  #45  04/25/2019  #45  03/25/2019  #45    No other meds noted  No fills noted in [place]    Genevieve Gutierrez LPN

## 2021-05-29 NOTE — TELEPHONE ENCOUNTER
RN cannot approve Refill Request    RN can NOT refill this medication med is not covered by policy/route to provider. Last office visit: Visit date not found Last Physical: Visit date not found Last MTM visit: Visit date not found Last visit same specialty: 5/20/2019 Demetris Osman MD.  Next visit within 3 mo: Visit date not found  Next physical within 3 mo: Visit date not found      Isis French, [facility] Triage/Med Refill 5/21/2019    Requested Prescriptions   Pending Prescriptions Disp Refills     ergocalciferol (ERGOCALCIFEROL) 50,000 unit capsule [Pharmacy Med Name: VITAMIN D2 1.25MG(50,000 UNIT)] 12 capsule 0     Sig: TAKE 1 CAPSULE BY MOUTH ONCE A WEEK FOR 12 DOSES.       There is no refill protocol information for this order

## 2021-05-29 NOTE — PROGRESS NOTES
"Assessment/Plan:    Essential hypertension  Normotensive.  Check basic metabolic panel.  Refill lisinopril given today.  No side effects.    History of prescription drug abuse (H)  Currently stable and well maintained on buprenorphine-naloxone.  -Continue Suboxone.  -There is some question of the cost of the urine drug screens.  He is going to check with his insurance company.  Given the stability of his clinical course, will defer urine drug screen this week pending better understanding of the cost to the patient.  -Follow-up in 1 month.    Prediabetes  The patient has been struggling somewhat in recent weeks/months to maintain his previous discipline with his meal plan.  We discussed strategies for resuming healthier approach to eating.    Return in about 1 month (around 7/15/2019) for recheck follow-up visit.    Demetris Osman MD  _______________________________    Chief Complaint   Patient presents with     Follow-up     suboxone      Subjective: Omero Mota is a 38 y.o. year old male who returns to clinic for the following chronic complaints/concerns:     Med check:   - suboxone: he received a large bill for the drug screen.  Doing well with medications.   - BP: no concerns. No lightheadedness.  No cough.  No fainting.  Not doing as well with fasting diet.  Worse with IF since travel last week.  Framework 18:6. Not much variability.   - urinary concerns: somewhat improved.    He relates this to constipation.  He continues to from a [place] type I.  He is not concerned about his urinary function at this time.    Review of systems is negative except for as shown in the HPI.    The following portions of the patient's history were reviewed and updated as appropriate: allergies, current medications, past medical history and problem list.    Objective:    height is 5' 6.5\" (1.689 m) and weight is 214 lb (97.1 kg). His blood pressure is 136/72 and his pulse is 76.   General: No acute distress  Cardiac: " Regular rate and rhythm, normal S1/S2, no murmurs of the gallops  Respiratory: Clear to auscultation bilaterally.  Psych: Normal affect.  Normal rate of speech.  No tangentiality.  Thinking is logical.      No results found for this or any previous visit (from the past 24 hour(s)).    Additional History from Old Records Summarized (2): no  Decision to Obtain Records (1): no  Radiology Tests Summarized or Ordered (1): no  Labs Reviewed or Ordered (1): yes  Medicine Test Summarized or Ordered (1): no  Independent Review of EKG or X-RAY(2 each): no    This note has been dictated using voice recognition software. Any grammatical or context distortions are unintentional and inherent to the software

## 2021-05-30 NOTE — PROGRESS NOTES
[place]:    Last [place]    06/17/2019  #45  05/20/2019  #45  04/25/2019  #45    No other meds noted  No fills noted in [place]    Genevieve Gutierrez LPN

## 2021-05-30 NOTE — TELEPHONE ENCOUNTER
New Appointment Needed  What is the reason for the visit:    1 month follow up  Provider Preference: PCP only  How soon do you need to be seen?: Patient would like to be seen on or before 07/29/19 as he will be out of town for 10 days after that.   Waitlist offered?: No  Okay to leave a detailed message:  Yes

## 2021-05-30 NOTE — PROGRESS NOTES
Assessment/Plan:    History of prescription drug abuse (H)  Doing well.  No abuse or misuse.  Check Utox at next visit.   - follow-up in one month   - if continues to be stable plan phone visit for September.     Return in about 4 weeks (around 8/6/2019) for recheck follow-up visit.    Demetris Osman MD  _______________________________    Chief Complaint   Patient presents with     Follow-up     suboxone     Subjective: Omero Mota is a 39 y.o. year old male who returns to clinic for the following chronic complaints/concerns:     Chem dep:   - doing well   - using medicine without change   - 18 months of stability   - constipation is a bit better   - expense of Utox is a challenge.    - otherwise feels well.    Review of systems is negative except for as shown in the HPI.    The following portions of the patient's history were reviewed and updated as appropriate: allergies, current medications, past medical history and problem list.    Objective:    weight is 214 lb (97.1 kg). His blood pressure is 128/70 and his pulse is 66.   General: No acute distress  Psych: Normal affect.  Normal rate of speech.  No tangentiality.  Denies suicidality.  Thinking is logical.  Denies hallucinations.     No results found for this or any previous visit (from the past 24 hour(s)).    Additional History from Old Records Summarized (2): no  Decision to Obtain Records (1): no  Radiology Tests Summarized or Ordered (1): no  Labs Reviewed or Ordered (1): no  Medicine Test Summarized or Ordered (1): no  Independent Review of EKG or X-RAY(2 each): no    This note has been dictated using voice recognition software. Any grammatical or context distortions are unintentional and inherent to the software

## 2021-05-30 NOTE — PROGRESS NOTES
[place]:    Last [place]    07/09/2019  #45  06/17/2019  #45  05/20/2019  #45      No other meds noted  No fills noted in [place]    Genevieve Gutierrez LPN

## 2021-05-30 NOTE — PROGRESS NOTES
Assessment/Plan:    History of prescription drug abuse (H)  Today's visit is earlier than previously negotiated as result of the patient's work/travel schedule and the providers travel schedule.  No evidence of misuse or diversion.  We will perform urine drug screen as previously discussed.  Refill of buprenorphine/naloxone sent to pharmacy.  Return to clinic in 4 weeks.    Return in about 4 weeks (around 8/26/2019) for recheck follow-up visit (suboxone and prediabetes).    Demetris Osman MD  _______________________________    Chief Complaint   Patient presents with     Follow-up     medications      Subjective: Omero Mota is a 39 y.o. year old male who returns to clinic for the following chronic complaints/concerns:     Suboxone:   - here early because of travel schedule and provider vacation.  No changes in status since I saw him earlier in July.  The patient continues to use 1-2 films per day averaging out to about 30/month.  He has not had cravings for prescription medications.  Side effects have been subsiding.  He does note that the urinalysis which was previously performed is about $190 and has not been covered by his insurance.    Review of systems is negative except for as shown in the HPI.    The following portions of the patient's history were reviewed and updated as appropriate: allergies, current medications, past medical history and problem list.    Objective:    weight is 213 lb (96.6 kg). His blood pressure is 128/74 and his pulse is 76.   General: No acute distress  Psych: Normal affect.  Normal rate of speech.  No tangentiality.  Thinking is logical.  Denies hallucinations.       Recent Results (from the past 24 hour(s))   Clinic Urine Drug Screen-RLN/STW Only   Result Value Ref Range    Amphetamine Screen Negative Screen Negative    Barbiturates Screen Negative Screen Negative    Buprenorphine Screen Positive (!) Screen Negative    Benzodiazepines Screen Negative Screen Negative     Cocaine Screen Negative Screen Negative    Methadone Metabolite Screen Negative Screen Negative    Ecstasy Screen Negative Screen Negative    Methamphetamine Screen Negative Screen Negative    Opiates Screen Negative Screen Negative    Oxycodone Screen Negative Screen Negative    PCP 25 Screen Negative Screen Negative    Marijuana Screen Negative Screen Negative    Specific Gravity 1.025 1.003 - 1.030    pH 4.0 (L) 5.0 - 8.0    Oxidants Normal Normal       Additional History from Old Records Summarized (2): no  Decision to Obtain Records (1): no  Radiology Tests Summarized or Ordered (1): no  Labs Reviewed or Ordered (1): yes  Medicine Test Summarized or Ordered (1): no  Independent Review of EKG or X-RAY(2 each): no    This note has been dictated using voice recognition software. Any grammatical or context distortions are unintentional and inherent to the software

## 2021-05-31 VITALS — WEIGHT: 221.7 LBS | BODY MASS INDEX: 35.78 KG/M2

## 2021-05-31 VITALS — BODY MASS INDEX: 33.73 KG/M2 | WEIGHT: 209 LBS

## 2021-05-31 NOTE — TELEPHONE ENCOUNTER
Please advise on this patient states his bottle states  Earliest refill available is on the 18th.  He wanted to make sure he could get his refill on the 18th and noticed you would be out of town.  He was seen on 7/29 follow up states 4 weeks.  Please advise on appt or refill

## 2021-05-31 NOTE — TELEPHONE ENCOUNTER
Medication refilled and sent to pharmacy on file.    Previous notes reviewed. Refill consistent with PCP plan.

## 2021-05-31 NOTE — TELEPHONE ENCOUNTER
Per Dr. Patino message today, rx sent on 13th failed new rx needed.  Confirmed with pharmacy  Please send rx per Dr. Osman request in his absence

## 2021-05-31 NOTE — TELEPHONE ENCOUNTER
Patient Returning Call  Reason for call:  suboxone order  Information relayed to patient:  Writer placed patient on hold to follow up with fellow staff and he hugn up.  He had a call from assistant saying they are aware of the transmission issues and are getting it sent over.    Patient has additional questions:  No  If YES, what are your questions/concerns:  NA  Okay to leave a detailed message?: No call back needed

## 2021-05-31 NOTE — TELEPHONE ENCOUNTER
FYI - Status Update  Who is Calling: Patient  Update: Per the request of patient I contacted the pharmacy and was informed there was an error on their part, and patient's prescription for Suboxone has been filled. Nothing further needs to be done at this time.  Okay to leave a detailed message?:  No return call needed

## 2021-05-31 NOTE — TELEPHONE ENCOUNTER
Suboxone script did not go through. It was early. Provider was going out of town. Patient is going to call the clinic on Monday. I let patient know I would send a message to his provider as well, but we discussed being unsure when provider would see and have access to. He verbalized understanding.

## 2021-05-31 NOTE — TELEPHONE ENCOUNTER
Sent again. Please check with pharmacy to see if received. If not, let me know and will move to printed and have patient .

## 2021-05-31 NOTE — TELEPHONE ENCOUNTER
RN cannot approve Refill Request    RN can NOT refill this medication PCP messaged that patient is overdue for Labs. Last office visit: 7/29/2019 Demetris Osman MD Last Physical: 12/1/2015 Last MTM visit: Visit date not found Last visit same specialty: 7/29/2019 Demetris Osman MD.  Next visit within 3 mo: Visit date not found  Next physical within 3 mo: Visit date not found      Celia Zhong, [facility] Triage/Med Refill 8/16/2019    Requested Prescriptions   Pending Prescriptions Disp Refills     metFORMIN (GLUCOPHAGE-XR) 500 MG 24 hr tablet 90 tablet 1       Metformin Refill Protocol Failed - 8/16/2019  9:13 AM        Failed - Microalbumin in last year      No results found for: MICROALBUR               Passed - Blood pressure in last 12 months     BP Readings from Last 1 Encounters:   07/29/19 128/74             Passed - LFT or AST or ALT in last 12 months     Albumin   Date Value Ref Range Status   12/01/2015 4.1 3.5 - 5.0 g/dL Final     Bilirubin, Total   Date Value Ref Range Status   12/01/2015 0.3 0.0 - 1.0 mg/dL Final     Alkaline Phosphatase   Date Value Ref Range Status   12/01/2015 88 45 - 120 U/L Final     AST   Date Value Ref Range Status   12/01/2015 33 0 - 40 U/L Final     ALT   Date Value Ref Range Status   06/17/2019 20 0 - 45 U/L Final     Protein, Total   Date Value Ref Range Status   12/01/2015 7.0 6.0 - 8.0 g/dL Final                Passed - GFR or Serum Creatinine in last 6 months     GFR MDRD Non Af Amer   Date Value Ref Range Status   06/17/2019 >60 >60 mL/min/1.73m2 Final     GFR MDRD Af Amer   Date Value Ref Range Status   06/17/2019 >60 >60 mL/min/1.73m2 Final             Passed - Visit with PCP or prescribing provider visit in last 6 months or next 3 months     Last office visit with prescriber/PCP: 7/29/2019 OR same dept: 7/29/2019 Demetris Osman MD OR same specialty: 7/29/2019 Demetris Osman MD Last physical: Visit date not found Last MTM visit: Visit date not  found         Next appt within 3 mo: Visit date not found  Next physical within 3 mo: Visit date not found  Prescriber OR PCP: Demetris Osman MD  Last diagnosis associated with med order: 1. Prediabetes  - metFORMIN (GLUCOPHAGE-XR) 500 MG 24 hr tablet  Dispense: 90 tablet; Refill: 1     If protocol passes may refill for 12 months if within 3 months of last provider visit (or a total of 15 months).           Passed - A1C in last 6 months     Hemoglobin A1c   Date Value Ref Range Status   03/29/2019 5.9 3.5 - 6.0 % Final

## 2021-05-31 NOTE — TELEPHONE ENCOUNTER
Pt was able to  rx, [facility] confused as to why it would not go through either.  I will call them after each fill to assure they can fill for next few to make sure.

## 2021-05-31 NOTE — TELEPHONE ENCOUNTER
Call from pt      Asked for the Rx # - pharmacy indicated they may be able to address this on their end    Did provide Rx# that  Is noted electronically      John Hurt RN   Triage and Medication Refills

## 2021-05-31 NOTE — TELEPHONE ENCOUNTER
Medication Question or Clarification  Who is calling: Patient  What medication are you calling about? (include dose and sig)       buprenorphine-naloxone (SUBOXONE) 8-2 mg Film per sublingual film 60 each 0 8/19/2019     Sig - Route: Place 1-2 Film under the tongue daily. - Sublingual    Sent to pharmacy as: buprenorphine-naloxone (SUBOXONE) 8-2 mg Film per sublingual film    Notes to Pharmacy: MEERA: [id number].  Normal Prescriber out of town.    E-Prescribing Status: Receipt confirmed by pharmacy (8/19/2019  1:06 PM CDT)      Who prescribed the medication?: Bora Kapadia, DO  What is your question/concern?: The patient states that there was a problem with the above script going through also on 8/13/2019 and again 8/20/2019. Writer called pharmacy and spoke with Quita and verified that there was a problem with the e-verification between [facility] submitting the script and [facility] pharmacy receiving the script and the transmission failed. The script from 8/19/2019 will need to be resent. Please contact the patient when the script has been sent.   Pharmacy: [facility]/pharmacy #[id number] - [address]   Okay to leave a detailed message?: No  Site CMT - Please call the pharmacy to obtain any additional needed information.

## 2021-05-31 NOTE — TELEPHONE ENCOUNTER
New Appointment Needed  What is the reason for the visit:    Office visit  Provider Preference: PCP only  How soon do you need to be seen?: this week  Waitlist offered?: No  Okay to leave a detailed message:  Yes

## 2021-05-31 NOTE — PROGRESS NOTES
"Assessment/Plan:    History of prescription drug abuse (H)  Doing well.  The patient and I clarified his dosing and most days he is using 2 films based on his level of cravings.  No evidence of misuse or diversion.  No obvious side effects.  We will focus future visit on prediabetes and other lifestyle factors.  For now, continue Suboxone.  We will plan to refill in 3 weeks when he requests a refill.  I will see the patient in clinic in 6-7 weeks for a formal clinic visit.    Return in about 7 weeks (around 10/17/2019) for Recheck follow-up visit.    Demetris Osman MD  _______________________________    Chief Complaint   Patient presents with     Follow-up     medication      Subjective: Omero Mota is a 39 y.o. year old male who returns to clinic for the following chronic complaints/concerns:     Chemical dependency: This patient presents for monthly follow-up while on Suboxone.  The past month, he is in general been using 2 films per day.  He says that this is effective in reducing his craving for prescription narcotics.  He otherwise feels well.  Today, he is going to watch his son's first school football game.  No side effects.  No obstructive symptoms of the lower urinary tract.  Bowel movements have been normal.  - To cost urinalysis.  He has not gotten clarification from his insurance company regarding their lack of coverage of the urinalysis/U tox that was done during his initial Suboxone visit.    Review of systems is negative except for as shown in the HPI.    The following portions of the patient's history were reviewed and updated as appropriate: allergies, current medications, past medical history and problem list.    Objective:    height is 5' 7\" (1.702 m) and weight is 218 lb (98.9 kg). His blood pressure is 132/70 and his pulse is 92.   General: No acute distress  Psych: Normal affect.  Normal rate of speech.  No tangentiality.  Thinking is logical.  Denies hallucinations.     No results " found for this or any previous visit (from the past 24 hour(s)).      Additional History from Old Records Summarized (2): no  Decision to Obtain Records (1): no  Radiology Tests Summarized or Ordered (1): no  Labs Reviewed or Ordered (1): no  Medicine Test Summarized or Ordered (1): no  Independent Review of EKG or X-RAY(2 each): no    This note has been dictated using voice recognition software. Any grammatical or context distortions are unintentional and inherent to the software

## 2021-06-01 ENCOUNTER — COMMUNICATION - HEALTHEAST (OUTPATIENT)
Dept: FAMILY MEDICINE | Facility: CLINIC | Age: 41
End: 2021-06-01

## 2021-06-01 VITALS — BODY MASS INDEX: 35.83 KG/M2 | WEIGHT: 222 LBS

## 2021-06-01 DIAGNOSIS — E78.00 HYPERCHOLESTEROLEMIA: ICD-10-CM

## 2021-06-01 DIAGNOSIS — E11.65 TYPE 2 DIABETES MELLITUS WITH HYPERGLYCEMIA, WITHOUT LONG-TERM CURRENT USE OF INSULIN (H): ICD-10-CM

## 2021-06-01 NOTE — TELEPHONE ENCOUNTER
Per [place] database:    Last fill    08/20/2019  #60  07/29/2019  #45  07/09/2019  #45    No other meds noted  No fills noted in [place]    Genevieve Gutierrez LPN

## 2021-06-01 NOTE — TELEPHONE ENCOUNTER
Controlled Substance Refill Request  Medication:   Requested Prescriptions     Pending Prescriptions Disp Refills     buprenorphine-naloxone (SUBOXONE) 8-2 mg Film per sublingual film 60 each 0     Sig: Place 1-2 Film under the tongue daily.     Date Last Fill: 8/21/19  [id number]   Submit electronically to pharmacy  Controlled Substance Agreement on File:   Encounter-Level CSA Scan Date:    There are no encounter-level csa scan date.       Last office visit: Last office visit pertaining to requested medication was 8/29/19.

## 2021-06-02 VITALS — BODY MASS INDEX: 33.73 KG/M2 | WEIGHT: 209 LBS

## 2021-06-02 VITALS — BODY MASS INDEX: 34.38 KG/M2 | WEIGHT: 213 LBS

## 2021-06-02 NOTE — TELEPHONE ENCOUNTER
Called pharmacy, they are going to run another generic brand through insurance, they will call back if they have any problems with insurance coverage.    Genevieve Gutierrez, CESIAN

## 2021-06-02 NOTE — PROGRESS NOTES
Assessment/Plan:    History of drug abuse in remission (H)  The patient continues to do well with his current regimen of buprenorphine/naloxone.  There is no evidence of misuse or diversion.  Given his stability over time, there is no indication for additional therapies (chemical dependency/psychology).  CSA updated.  This will be scanned into the medical record today.  -Continue current dose.  -Drugs of abuse screen today.  - We will plan an annual exam with fasting lab work at his next visit.    Return in about 2 months (around 12/7/2019) for Annual physical.    Demetris Osman MD  _______________________________    Chief Complaint   Patient presents with     Follow-up     suboxone ( no CSA noted)      Subjective: Omero Mota is a 39 y.o. year old male who returns to clinic for the following chronic complaints/concerns:     Med check visit: This patient continues to take Suboxone.  On average, he is taking 2 films per day.  He has not had any recent obstructive symptoms of the lower urinary tract.  He feels that the medicine is helpful (has a preference for name brand versus non-name brand medication).  No recidivism.  No alcohol.  Continues to be actively involved in his [place] community.  He is active in caring for his children as they are active in sports in their hometown.  Review of systems is negative except for as shown in the HPI.    The following portions of the patient's history were reviewed and updated as appropriate: allergies, current medications, past medical history and problem list.    Objective:    weight is 224 lb (101.6 kg) (abnormal). His blood pressure is 118/70 and his pulse is 88.   General: No acute distress  Psych: Normal affect.  Normal rate of speech.  No tangentiality.  Thinking is logical.        No results found for this or any previous visit (from the past 24 hour(s)).    Additional History from Old Records Summarized (2): no  Decision to Obtain Records (1): no  Radiology  Tests Summarized or Ordered (1): no  Labs Reviewed or Ordered (1): yes  Medicine Test Summarized or Ordered (1): no  Independent Review of EKG or X-RAY(2 each): no    This note has been dictated using voice recognition software. Any grammatical or context distortions are unintentional and inherent to the software

## 2021-06-02 NOTE — TELEPHONE ENCOUNTER
Who is calling:  Pharmacy  Reason for Call:  Caller stated she would like to make the patient's care team aware that the pharmacy will not be able to fill the patient's prescription at this time.  Caller stated the last fill was picked up on 9/17/19 and the earliest the pharmacy could refill the medication would be 10/15/19.  Date of last appointment with primary care: N/a  Okay to leave a detailed message: No

## 2021-06-02 NOTE — PROGRESS NOTES
[place]    Last [place]    09/17/2019  #60  08/20/2019  #60  07/29/2019  #45    No other meds noted  No fills noted in [place]    Genevieve Gutierrez LPN

## 2021-06-02 NOTE — TELEPHONE ENCOUNTER
Pharmacy faxing note stating   Alternative requested : [facility]itt brand no longer manufactured please submit alt brand or specify must be suboxone not generic on rx.

## 2021-06-03 VITALS
SYSTOLIC BLOOD PRESSURE: 134 MMHG | WEIGHT: 225 LBS | DIASTOLIC BLOOD PRESSURE: 80 MMHG | HEART RATE: 76 BPM | BODY MASS INDEX: 35.24 KG/M2

## 2021-06-03 VITALS
WEIGHT: 224 LBS | SYSTOLIC BLOOD PRESSURE: 118 MMHG | BODY MASS INDEX: 35.08 KG/M2 | DIASTOLIC BLOOD PRESSURE: 70 MMHG | HEART RATE: 88 BPM

## 2021-06-03 VITALS — WEIGHT: 218 LBS | BODY MASS INDEX: 34.21 KG/M2 | HEIGHT: 67 IN

## 2021-06-03 VITALS — BODY MASS INDEX: 33.86 KG/M2 | WEIGHT: 213 LBS

## 2021-06-03 VITALS — WEIGHT: 214 LBS | BODY MASS INDEX: 34.02 KG/M2

## 2021-06-03 VITALS — HEIGHT: 67 IN | WEIGHT: 214 LBS | BODY MASS INDEX: 33.59 KG/M2

## 2021-06-03 NOTE — TELEPHONE ENCOUNTER
Controlled Substance Refill Request  Medication:   Requested Prescriptions     Pending Prescriptions Disp Refills     SUBOXONE 8-2 mg Film per sublingual film 60 each 0     Sig: Place 1-2 Film under the tongue daily.     Date Last Fill: 10.7.19  Pharmacy: [facility]   Submit electronically to pharmacy  Controlled Substance Agreement on File:   Encounter-Level CSA Scan Date:    There are no encounter-level csa scan date.       Last office visit: Last office visit pertaining to requested medication was 10.7.19.

## 2021-06-03 NOTE — TELEPHONE ENCOUNTER
Medication Question or Clarification  Who is calling: Pharmacy: Marty with [facility], [phone]  What medication are you calling about? (include dose and sig)   SUBOXONE 8-2 mg Film per sublingual film 60 each 0 11/7/2019     Sig - Route: Place 1-2 Film under the tongue daily. - Sublingual    Sent to pharmacy as: Suboxone 8 mg-2 mg sublingual film    Notes to Pharmacy: MEERA: [id number]. Early refill.  Please fill at normal interval.    E-Prescribing Status: Transmission to pharmacy failed (11/12/2019  9:44 AM CST)        Who prescribed the medication?:   Demetris Osman MD  What is your question/concern?:   Please resend above prescription.  Transmission fail today at 9:44am.  Pharmacy:   [facility] # [id number]  Okay to leave a detailed message?: Yes  Site CMT - Please call the pharmacy to obtain any additional needed information.    Please expedite as patient is leaving out of town at noon today, 11/12/19

## 2021-06-03 NOTE — TELEPHONE ENCOUNTER
[place]    Last [place]    10/14/2019  #60  09/17/2019  #60  08/20/2019  #60    No other meds noted  No fills noted in [place]    Genevieve Gutierrez LPN

## 2021-06-04 VITALS
RESPIRATION RATE: 18 BRPM | WEIGHT: 226 LBS | HEART RATE: 74 BPM | DIASTOLIC BLOOD PRESSURE: 80 MMHG | OXYGEN SATURATION: 98 % | BODY MASS INDEX: 35.4 KG/M2 | SYSTOLIC BLOOD PRESSURE: 126 MMHG | TEMPERATURE: 98.2 F

## 2021-06-04 VITALS
SYSTOLIC BLOOD PRESSURE: 130 MMHG | HEART RATE: 80 BPM | WEIGHT: 221 LBS | BODY MASS INDEX: 34.61 KG/M2 | DIASTOLIC BLOOD PRESSURE: 74 MMHG

## 2021-06-04 NOTE — PROGRESS NOTES
Assessment/Plan:    Class 2 severe obesity due to excess calories with serious comorbidity and body mass index (BMI) of 35.0 to 35.9 in adult (H)  Discussed fasting framework.      History of drug abuse in remission (H)  This patient presents for follow-up.  His most recent urine drug screen was without unexpected abnormalities.  He is tolerant to his current medication.  No evidence of recidivism.  No evidence of side effects.  -Continue buprenorphine/naloxone at current dose.  We will plan a follow-up clinic visit in 2 months.      Greater than 15 minutes of total time was spent with patient of which greater than 50% was spent on counseling and coordination of care.    Return in about 8 weeks (around 1/30/2020) for Med Check - suboxone.    Demetris Osman MD  _______________________________    Chief Complaint   Patient presents with     Follow-up     medications      Subjective: Omero Mota is a 39 y.o. year old male who returns to clinic for the following chronic complaints/concerns:     Med check:   - continues to do well.  No use of pills.  No alcohol.    - currently on IF plan.  Less pop.  Down 10 lbs.   - side effects from suboxone: none.    - no other concerns     Review of systems is negative except for as shown in the HPI.    The following portions of the patient's history were reviewed and updated as appropriate: allergies, current medications, past medical history and problem list.    Objective:    weight is 225 lb (102.1 kg) (abnormal). His blood pressure is 134/80 and his pulse is 76.   General: No acute distress  Psych: Normal affect.  Normal rate of speech.  No tangentiality.  Thinking is logical.  Denies hallucinations.     No results found for this or any previous visit (from the past 24 hour(s)).    Additional History from Old Records Summarized (2): no  Decision to Obtain Records (1): no  Radiology Tests Summarized or Ordered (1): no  Labs Reviewed or Ordered (1): yes  Medicine Test  Summarized or Ordered (1): no  Independent Review of EKG or X-RAY(2 each): no    This note has been dictated using voice recognition software. Any grammatical or context distortions are unintentional and inherent to the software

## 2021-06-04 NOTE — TELEPHONE ENCOUNTER
Refill Approved    Rx renewed per Medication Renewal Policy. Medication was last renewed on 6/17/19.    Genevieve Martell, [facility] Triage/Med Refill 12/8/2019     Requested Prescriptions   Pending Prescriptions Disp Refills     lisinopril (PRINIVIL,ZESTRIL) 20 MG tablet [Pharmacy Med Name: LISINOPRIL 20 MG TABLET] 30 tablet 5     Sig: TAKE 1 TABLET BY MOUTH EVERY DAY       [employer] Refill Protocol Passed - 12/8/2019 12:48 AM        Passed - PCP or prescribing provider visit in past 12 months       Last office visit with prescriber/PCP: 12/5/2019 Demetris Osman MD OR same dept: 12/5/2019 Demetris Osman MD OR same specialty: 12/5/2019 Demetris Osman MD  Last physical: 12/1/2015 Last MTM visit: Visit date not found   Next visit within 3 mo: Visit date not found  Next physical within 3 mo: Visit date not found  Prescriber OR PCP: Demetris Osman MD  Last diagnosis associated with med order: 1. Essential hypertension  - lisinopril (PRINIVIL,ZESTRIL) 20 MG tablet [Pharmacy Med Name: LISINOPRIL 20 MG TABLET]; TAKE 1 TABLET BY MOUTH EVERY DAY  Dispense: 30 tablet; Refill: 5    If protocol passes may refill for 12 months if within 3 months of last provider visit (or a total of 15 months).             Passed - Serum Potassium in past 12 months     Lab Results   Component Value Date    Potassium 4.3 06/17/2019             Passed - Blood pressure filed in past 12 months     BP Readings from Last 1 Encounters:   12/05/19 134/80             Passed - Serum Creatinine in past 12 months     Creatinine   Date Value Ref Range Status   06/17/2019 0.90 0.70 - 1.30 mg/dL Final

## 2021-06-04 NOTE — TELEPHONE ENCOUNTER
[place]-    Last [place]    12/09/2019  #60  11/12/2019  #60  10/14/2019  #60  09/17/2019  #60    No other meds noted    Genevieve Gutierrez LPN

## 2021-06-04 NOTE — PROGRESS NOTES
[place]    Last fill    11/02/2019  #60  10/14/2019  #60  09/17/2019  #60    No other meds noted    Genevieve Gutierrez LPN

## 2021-06-04 NOTE — TELEPHONE ENCOUNTER
Medication Question or Clarification  Who is calling: Pharmacy: Sherri from [facility] # [id number]  What medication are you calling about? (include dose and sig)     SUBOXONE 8-2 mg Film per sublingual film 60 each 0 1/6/2020     Sig - Route: Place 1-3 Film under the tongue daily. - Sublingual      Who prescribed the medication?: Demetris Osman MD  What is your question/concern?: Pharmacist asking to speak to provider regarding this medication, as she has multiple concerns.    Refill history    Patient reports provider will prescribe whatever he asks him to prescribe  Pharmacy: [facility] # [id number]  Okay to leave a detailed message?: Yes  Site CMT - Please call the pharmacy to obtain any additional needed information.

## 2021-06-05 VITALS
OXYGEN SATURATION: 99 % | HEART RATE: 64 BPM | SYSTOLIC BLOOD PRESSURE: 118 MMHG | RESPIRATION RATE: 16 BRPM | BODY MASS INDEX: 34.3 KG/M2 | WEIGHT: 219 LBS | DIASTOLIC BLOOD PRESSURE: 74 MMHG

## 2021-06-05 VITALS
BODY MASS INDEX: 35.63 KG/M2 | HEART RATE: 90 BPM | SYSTOLIC BLOOD PRESSURE: 118 MMHG | WEIGHT: 227 LBS | DIASTOLIC BLOOD PRESSURE: 62 MMHG | OXYGEN SATURATION: 98 % | RESPIRATION RATE: 20 BRPM | HEIGHT: 67 IN | TEMPERATURE: 98 F

## 2021-06-05 NOTE — TELEPHONE ENCOUNTER
Controlled Substance Refill Request  Medication Name:   Requested Prescriptions     Pending Prescriptions Disp Refills     SUBOXONE 8-2 mg Film per sublingual film 60 each 0     Sig: Place 0.5 Film under the tongue 4 (four) times a day.     Date Last Fill:   SUBOXONE 8-2 mg Film per sublingual film 60 each 0 1/13/2020  Yes   Sig - Route: Place 0.5 Film under the tongue 4 (four) times a day. - Sublingual   Sent to pharmacy as: Suboxone 8 mg-2 mg sublingual film   Notes to Pharmacy: MEERA: [id number] . Patient was seen in clinic.  He will use 2 films or less per day.   E-Prescribing Status: Receipt confirmed by pharmacy (1/13/2020 11:22 AM CST)   Requested Pharmacy: [id number]  Submit electronically to pharmacy  Controlled Substance Agreement on file:   Encounter-Level CSA Scan Date:    There are no encounter-level csa scan date.        Last office visit:  1/13/2020

## 2021-06-05 NOTE — TELEPHONE ENCOUNTER
Controlled Substance Refill Request  Medication Name:   Requested Prescriptions     Pending Prescriptions Disp Refills     SUBOXONE 8-2 mg Film per sublingual film 60 each 0     Sig: Place 0.5 Film under the tongue 4 (four) times a day.     Date Last Fill:   SUBOXONE 8-2 mg Film per sublingual film 60 each 0 1/13/2020  Yes   Sig - Route: Place 0.5 Film under the tongue 4 (four) times a day. - Sublingual   Sent to pharmacy as: Suboxone 8 mg-2 mg sublingual film   Notes to Pharmacy: MEERA: [id number] . Patient was seen in clinic.  He will use 2 films or less per day.   E-Prescribing Status: Receipt confirmed by pharmacy (1/13/2020 11:22 AM CST)   Requested Pharmacy: [id number]  Submit electronically to pharmacy  Controlled Substance Agreement on file:   Encounter-Level CSA Scan Date:    There are no encounter-level csa scan date.        Last office visit:  1/13/2020

## 2021-06-05 NOTE — TELEPHONE ENCOUNTER
I spoke with the pharmacist at the patient's pharmacy who is concerned that month over month the patient had been receiving his medications 2-3 days earlier than the previous dispense.  Based on review of September through January, the patient should have roughly 14 films on hand.  She is concerned that he is using beyond the signature and wanted to call attention.  - Limited dispense of Suboxone sent to pharmacy.  Patient should be seen in clinic next week to discuss discrepancy.  14 film sent to pharmacy.

## 2021-06-05 NOTE — PROGRESS NOTES
[place]-    Last [place]    01/06/2020  #14  12/09/2019  #60  11/12/2019  #60    No other meds noted    Genevieve Gutierrez LPN

## 2021-06-05 NOTE — PROGRESS NOTES
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  We met and discussed discrepancies with the dispense dates and the need for an early refill.  I suspect that he is actually been taking five 1/2 films per day on some days.  We clarified that the expectation is that every 30 days he would go through 60 films.  He verbalized understanding.  Urine drugs of abuse screen will be obtained today.  Otherwise, symptoms are stable.  Return to clinic in 2 months.    Return in about 2 months (around 3/13/2020) for Med Check - suboxone.    Demetris Osman MD  _______________________________    Chief Complaint   Patient presents with     Follow-up     medications      Subjective: Omero Mota is a 39 y.o. year old male who returns to clinic for the following chronic complaints/concerns:     Suboxone:   - he believes that his dose is correct.  In general, he takes 2 films per day. He will generally take in 1/2 film dose 4x/day.  He finds that the whole film wears off and is \"too much\" if he takes it at once.    - stored in a safe   - he travels erratically for work.      Review of systems is negative except for as shown in the HPI.    The following portions of the patient's history were reviewed and updated as appropriate: allergies, current medications, past medical history and problem list.    Objective:    weight is 221 lb (100.2 kg). His blood pressure is 130/74 and his pulse is 80.   General: No acute distress  Psych: Normal affect.  Normal rate of speech.  No tangentiality.  Denies suicidality.  Thinking is logical.  Denies hallucinations.     No results found for this or any previous visit (from the past 24 hour(s)).    Additional History from Old Records Summarized (2): no  Decision to Obtain Records (1): no  Radiology Tests Summarized or Ordered (1): no  Labs Reviewed or Ordered (1): yes  Medicine Test Summarized or Ordered (1): no  Independent Review of EKG or X-RAY(2 " each): no    This note has been dictated using voice recognition software. Any grammatical or context distortions are unintentional and inherent to the software

## 2021-06-06 NOTE — TELEPHONE ENCOUNTER
[place]:    Last [place]    02/12/2020  #60  01/13/2020  #60  01/06/2020  #14  12/09/2019  #60    No other meds noted    Genevieve Gutierrez LPN

## 2021-06-06 NOTE — PROGRESS NOTES
[place]:    Last fill    03/12/2020  #60  02/12/2020  #60  01/13/2020  #60    No other meds noted    Genevieve Gutierrez LPN

## 2021-06-06 NOTE — TELEPHONE ENCOUNTER
Who is calling:  Patient   Reason for Call:  Patient is calling for follow up on below refill medication patient states he is going out of town to night requesting to process as soon as possible.  Date of last appointment with primary care: 01/13/20  Okay to leave a detailed message: No

## 2021-06-06 NOTE — PROGRESS NOTES
Start: 9:43 AM  End: 9:55 AM    Phone number dialed: [phone].  This visit was completed telephonically in an effort to minimize this patient's contact with the healthcare system in the setting of the COVID-19 pandemic.    Assessment/Plan:    History of drug abuse in remission (H)  Doing well.  Reviewed database.  This aligns within a day or two of expectations.  I have no concerns for misuse or diversion.  We will plan for phone call in 6-8 weeks.  Plan for refill       Return in about 7 weeks (around 5/5/2020) for Med Check - suboxone, E-visit or Telephone visit.    Demetris Osman MD  _______________________________    Chief Complaint   Patient presents with     Follow-up     medication      Subjective: Omero Mota is a 39 y.o. year old male who returns to clinic for the following chronic complaints/concerns:     Suboxone:   - no concerns.  He has been able to get medication without difficulty.  Effective.  No training.  He is now working from home.      Review of systems is negative except for as shown in the HPI.    The following portions of the patient's history were reviewed and updated as appropriate: allergies, current medications, past medical history and problem list.    Objective:    vitals were not taken for this visit.   No face-to-face exam was performed as part of today's visit.  The patient had a normal affect on the phone.  Speech was coherent.  There was no shortness of breath.  No tangentiality.  No acute distress.      No results found for this or any previous visit (from the past 24 hour(s)).    Additional History from Old Records Summarized (2): no  Decision to Obtain Records (1): no  Radiology Tests Summarized or Ordered (1): no  Labs Reviewed or Ordered (1): no  Medicine Test Summarized or Ordered (1): no  Independent Review of EKG or X-RAY(2 each): no    This note has been dictated using voice recognition software. Any grammatical or context distortions are unintentional and  inherent to the software

## 2021-06-07 NOTE — TELEPHONE ENCOUNTER
Controlled Substance Refill Request  Medication Name:   Requested Prescriptions     Pending Prescriptions Disp Refills     SUBOXONE 8-2 mg Film per sublingual film 60 each 0     Sig: Place 0.5 Film under the tongue 4 (four) times a day.     Date Last Fill: 4/8/20  [facility]  Submit electronically to pharmacy  Controlled Substance Agreement on file:   Encounter-Level CSA Scan Date:    There are no encounter-level csa scan date.        Last office visit:  3/17/20

## 2021-06-07 NOTE — TELEPHONE ENCOUNTER
Controlled Substance Refill Request  Medication Name:   Requested Prescriptions     Pending Prescriptions Disp Refills     SUBOXONE 8-2 mg Film per sublingual film 60 each 0     Sig: Place 0.5 Film under the tongue 4 (four) times a day.     Date Last Fill: 3/11/20  [facility]  Submit electronically to [facility]  Controlled Substance Agreement on file:   Encounter-Level CSA Scan Date:    There are no encounter-level csa scan date.        Last office visit:  1/13/20

## 2021-06-07 NOTE — TELEPHONE ENCOUNTER
lisinopril (PRINIVIL,ZESTRIL) 10 MG tablet [[id number]]     Electronically signed by: Demetris Osman MD on 11/02/17 1010  Status: Discontinued    Ordering user: Demetris Osman MD 11/02/17 1010  Authorized by: Demetris Osman MD    Frequency: DAILY 11/02/17 - 05/18/18  Discontinued by: Demetris Osman MD

## 2021-06-07 NOTE — TELEPHONE ENCOUNTER
RN cannot approve Refill Request    RN can NOT refill this medication Protocol failed and NO refill given. Last office visit: 1/13/2020 Demetris Osman MD Last Physical: Visit date not found Last MTM visit: Visit date not found Last visit same specialty: 1/13/2020 Demetris Osman MD.  Next visit within 3 mo: Visit date not found  Next physical within 3 mo: Visit date not found      Bernie Moe, [facility] Triage/Med Refill 4/7/2020    Requested Prescriptions   Pending Prescriptions Disp Refills     metFORMIN (GLUCOPHAGE-XR) 500 MG 24 hr tablet [Pharmacy Med Name: METFORMIN HCL  MG TABLET] 30 tablet 5     Sig: TAKE 1 TABLET BY MOUTH ONCE DAILY WITH SUPPER       Metformin Refill Protocol Failed - 4/7/2020 12:25 AM        Failed - A1C in last 6 months     Hemoglobin A1c   Date Value Ref Range Status   03/29/2019 5.9 3.5 - 6.0 % Final               Failed - Microalbumin in last year      No results found for: MICROALBUR               Passed - Blood pressure in last 12 months     BP Readings from Last 1 Encounters:   01/13/20 130/74             Passed - LFT or AST or ALT in last 12 months     Albumin   Date Value Ref Range Status   12/01/2015 4.1 3.5 - 5.0 g/dL Final     Bilirubin, Total   Date Value Ref Range Status   12/01/2015 0.3 0.0 - 1.0 mg/dL Final     Alkaline Phosphatase   Date Value Ref Range Status   12/01/2015 88 45 - 120 U/L Final     AST   Date Value Ref Range Status   12/01/2015 33 0 - 40 U/L Final     ALT   Date Value Ref Range Status   06/17/2019 20 0 - 45 U/L Final     Protein, Total   Date Value Ref Range Status   12/01/2015 7.0 6.0 - 8.0 g/dL Final                Passed - GFR or Serum Creatinine in last 6 months     GFR MDRD Non Af Amer   Date Value Ref Range Status   06/17/2019 >60 >60 mL/min/1.73m2 Final     GFR MDRD Af Amer   Date Value Ref Range Status   06/17/2019 >60 >60 mL/min/1.73m2 Final             Passed - Visit with PCP or prescribing provider visit in last 6 months or  next 3 months     Last office visit with prescriber/PCP: 1/13/2020 OR same dept: 1/13/2020 Demetris Osman MD OR same specialty: 1/13/2020 Demetris Osman MD Last physical: Visit date not found Last MTM visit: Visit date not found         Next appt within 3 mo: Visit date not found  Next physical within 3 mo: Visit date not found  Prescriber OR PCP: Demetris Osman MD  Last diagnosis associated with med order: 1. Prediabetes  - metFORMIN (GLUCOPHAGE-XR) 500 MG 24 hr tablet [Pharmacy Med Name: METFORMIN HCL  MG TABLET]; TAKE 1 TABLET BY MOUTH ONCE DAILY WITH SUPPER  Dispense: 30 tablet; Refill: 5     If protocol passes may refill for 12 months if within 3 months of last provider visit (or a total of 15 months).

## 2021-06-07 NOTE — TELEPHONE ENCOUNTER
Pt will due for for a suboxone refill on 04/11/2020-set up to authorize    [place]:    03/12/2020  #60  02/12/2020  #60  01/13/2020  #60    No other meds noted    Genevieve Gutierrez LPN

## 2021-06-07 NOTE — TELEPHONE ENCOUNTER
[place]:    Last fill    04/08/2020  #60  03/12/2020  #60  02/12/2020  #60    No other meds noted    Genevieve Gutierrez LPN

## 2021-06-08 NOTE — TELEPHONE ENCOUNTER
Left message to call back for: Omero   Information to relay to patient:  See message below and assist in scheduling when he calls back    Please schedule video visit in 3-4 weeks (before next refill).       Genevieve Gutierrez LPN

## 2021-06-08 NOTE — TELEPHONE ENCOUNTER
Controlled Substance Refill Request  Medication Name:   Requested Prescriptions     Pending Prescriptions Disp Refills     SUBOXONE 8-2 mg Film per sublingual film 60 each 0     Sig: Place 0.5 Film under the tongue 4 (four) times a day.     Date Last Fill: 5/7/2020  Is patient out of medication?:  Yes  Patient notified refills processed within 3 business days:  Yes  [facility]  Submit electronically to pharmacy  Controlled Substance Agreement on file:   Encounter-Level CSA Scan Date:    There are no encounter-level csa scan date.        Last office visit:  3/17/2020

## 2021-06-08 NOTE — TELEPHONE ENCOUNTER
[place]:    Last fill    05/08/2020  #60  04/08/2020  #60  03/12/2020  #60    No other meds noted    Genevieve Gutierrez LPN

## 2021-06-09 NOTE — PROGRESS NOTES
HISTORY OF PRESENT ILLNESS  Patient comes in for recheck. He has a history of serous otitis relieved with PE tube placement. He reports that his hearing is down in both ears and he believes that his tubes are out. No drainage. He does have some discomfort in the ears.     REVIEW OF SYSTEMS  Review of Systems: a 10-system review was performed. Pertinent positives are noted in the HPI and on a separate scanned document in the chart.    PMH, PSH, FH and SH has documented in the EHR.      EXAM    CONSTITUTIONAL  General Appearance:  Normal, well developed, well nourished, no obvious distress  Ability to Communicate:  communicates appropriately.    HEAD AND FACE  Appearance and Symmetry:  Normal, no scalp or facial scarring or suspicious lesions.  Paranasal sinuses tenderness:  Normal, Paranasal sinuses non tender    EARS  Clinical speech reception threshold:  Normal, able to hear normal speech.  Auricle:  Normal, Auricles without scars, lesions, masses.  External auditory canal:  Normal, External auditory canal normal.  Tympanic membrane:  Bilateral TM retraction.    NOSE (speculum or scope)  Architecture:  Normal, Grossly normal external nasal architecture with no masses or lesions.  Mucosa:  Normal mucosa, No polyps or masses.  Septum:  Normal, Septum non-obstructing.  Turbinates:  Normal, No turbinate abnormalities    ORAL CAVITY AND OROPHARYNX  Lips:  Normal.  Dental and gingiva:  Normal, No obvious dental or gingival disease.  Mucosa:  Normal, Moist mucous membranes.  Tongue:  Normal, Tongue mobile with no mucosal abnormalities  Hard and soft palate:  Normal, Hard and soft palate without cleft or mucosal lesions.  Oral pharynx:  Normal, Posterior pharynx without lesions or remarkable asymmetry.  Saliva:  Normal, Clear saliva.  Masses:  Normal, No palpable masses or pathologically enlarged lymph nodes.    NECK  Masses/lymph nodes:  Normal, No worrisome neck masses or lymph nodes.  Salivary glands:  Normal, Parotid  and submandibular glands.  Trachea and larynx position:  Normal, Trachea and larynx midline.  Thyroid:  Normal, No thyroid abnormality.  Tenderness:  Normal, No cervical tenderness.  Suppleness:  Normal, Neck supple    NEUROLOGICAL  Speech pattern:  Normal, Proasaic    RESPIRATORY  Symmetry and Respiratory effort:  Normal, Symmetric chest movement and expansion with no increased intercostal retractions or use of accessory muscles.     HEARING TEST  Results of hearing test as documented in audiology notes which were reviewed.    IMPRESSION  Based on tymp he has TM motion. However the exam shows retracted TMs.    RECOMMENDATION  I discussed replacement of the PE tubes. He has had many sets and wants them back in. He was taken to the treatment room. The right ear was examined under the microscope revealing significant retraction. A small amount of phenol was applied to the posteroinferior quadrant. A myringotomy was made. There was scant fluid. A Triune tube was placed. A similar procedure was performed on the opposite side. He had subjective improvement in his hearing. Follow up as needed.    Amador Shelton MD

## 2021-06-09 NOTE — PROGRESS NOTES
"Omero Mota is a 40 y.o. male who is being evaluated via a billable video visit.      The patient has been notified of following:     \"This video visit will be conducted via a call between you and your physician/provider. We have found that certain health care needs can be provided without the need for an in-person physical exam.  This service lets us provide the care you need with a video conversation.  If a prescription is necessary we can send it directly to your pharmacy.  If lab work is needed we can place an order for that and you can then stop by our lab to have the test done at a later time.    Video visits are billed at different rates depending on your insurance coverage. Please reach out to your insurance provider with any questions.    If during the course of the call the physician/provider feels a video visit is not appropriate, you will not be charged for this service.\"    Patient has given verbal consent to a Video visit? Yes    Will anyone else be joining your video visit? 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  No concerns for misuse or diversion. We will plan for clinic visit in ~6-8 weeks with urine tox screen.  Plan for visit at the end of August.   - face to face visit in ~5-6 weeks.     Return in about 5 years (around 7/23/2025) for Med Check - suboxone.    Demetris Osman MD  _______________________________    Chief Complaint   Patient presents with     Medication Management     Subjective: Omero Mota is a 40 y.o. year old male who returns to clinic for the following chronic complaints/concerns:     Med check:   - overall, doing well.  Medication continues to work.  He has not resumed traveling for work.  No side effects. No constipation.  Working from home.  Support network: continues online services through his [employer]. He also does online groups.  He has no concerns that he " will use.  Some cravings for more suboxone.     - emesis overnight.  Feeling better today.   - new dog.      Review of systems is negative except for as shown in the HPI.    The following portions of the patient's history were reviewed and updated as appropriate: allergies, current medications, past medical history and problem list.    Objective:    vitals were not taken for this visit.   General: No acute distress  Psych: Normal affect.  Normal rate of speech.  No tangentiality.  Denies suicidality.  Thinking is logical.  Denies hallucinations.     No data recorded  No data recorded  No data recorded  No data recorded    No results found for this or any previous visit (from the past 24 hour(s)).    Additional History from Old Records Summarized (2): no  Decision to Obtain Records (1): no  Radiology Tests Summarized or Ordered (1): no  Labs Reviewed or Ordered (1): no  Medicine Test Summarized or Ordered (1): no  Independent Review of EKG or X-RAY(2 each): no    This note has been dictated using voice recognition software. Any grammatical or context distortions are unintentional and inherent to the software    Video-Visit Details    Type of service:  Video Visit    Video End Time (time video stopped): 8:17 AM  Originating Location (pt. Location): Home    Distant Location (provider location):  [facility]     Platform used for Video Visit: Yudy Osman MD

## 2021-06-09 NOTE — TELEPHONE ENCOUNTER
"I resent his prescription for buprenorphine-naloxone.  I remove the \"dispense as written\" tag.    "
Called pharmacy, medication is covered    Genevieve Gutierrez LPN  
Medication Question or Clarification  Who is calling: Pharmacy  What medication are you calling about (include dose and sig)?: SUBOXONE 8-2 mg Film per sublingual film   Who prescribed the medication?: Demetris Osman MD  What is your question/concern?: Pt insurance no longer covers brand names.  Pharmacy looking or PA or alternative/generic medication. Pt purchased a 3 day supply so he would not go without 07/05/2020. Please advise.    [facility]  Okay to leave a detailed message?: Yes        
Admission Reconciliation is Not Complete  Discharge Reconciliation is Not Complete

## 2021-06-09 NOTE — TELEPHONE ENCOUNTER
----- Message from Demetris Osman MD sent at 6/25/2020  8:18 AM CDT -----  Please call and schedule face to face toward end of July.

## 2021-06-10 ENCOUNTER — COMMUNICATION - HEALTHEAST (OUTPATIENT)
Dept: FAMILY MEDICINE | Facility: CLINIC | Age: 41
End: 2021-06-10

## 2021-06-10 DIAGNOSIS — F19.11 HISTORY OF DRUG ABUSE IN REMISSION (H): ICD-10-CM

## 2021-06-10 NOTE — TELEPHONE ENCOUNTER
Controlled Substance Refill Request  Medication Name:   Requested Prescriptions     Pending Prescriptions Disp Refills     buprenorphine-naloxone (SUBOXONE) 8-2 mg Film per sublingual film 90 each 0     Sig: Place 1.5 Film under the tongue 2 (two) times a day.     Date Last Fill:  7/6/2020  Requested Pharmacy: [id number], [place], [place]  Submit electronically to pharmacy  Controlled Substance Agreement on file:   Encounter-Level CSA Scan Date:    There are no encounter-level csa scan date.        Last office visit:  Virtual visit 6/25/2020 with PCP Dr HARRIET Osman

## 2021-06-10 NOTE — TELEPHONE ENCOUNTER
Per [place] database-    Last fill    07/07/2020  #90  07/05/2020  #10  06/08/2020  #60    No other meds noted    Genevieve Gutierrez LPN

## 2021-06-11 NOTE — PROGRESS NOTES
Assessment/Plan:    Problem List Items Addressed This Visit        ENT/CARD/PULM/ENDO Problems    Essential hypertension     Consistently elevated with blood pressure checks in multiple settings including clinics other than her own.  We will initiate therapy with 10 mg of lisinopril today.  The patient will return for a blood pressure check with nursing staff in 2 weeks.  He will return to see me in 5-6 weeks to talk about side effects and further titration of blood pressure medications.         Relevant Medications    lisinopril (PRINIVIL,ZESTRIL) 10 MG tablet    Other Relevant Orders    Basic Metabolic Panel    Lipid Cascade    Glycosylated Hemoglobin A1c (Completed)    Hypercholesterolemia - High intensity statin indicated     The patient declined treatment 19 months ago.  We will recheck his cholesterol levels today as he has not been drinking alcohol for the past 6 months.  Anticipate improvement but I suspect he may have developed diabetes and will still benefit from a cholesterol-lowering medication.         Relevant Orders    Basic Metabolic Panel    Lipid Cascade    Glycosylated Hemoglobin A1c (Completed)       Other    BMI 36.0-36.9,adult    Relevant Orders    Basic Metabolic Panel    Lipid Cascade    Glycosylated Hemoglobin A1c (Completed)    Tobacco abuse     The patient is not smoking.  He does continue to chew.  He was encouraged to discontinue chewing.         Alcohol abuse, in remission     Patient was congratulated on his alcohol sobriety.         Prediabetes     The patient has not been drinking any alcohol for the past 6 months.  We will check a hemoglobin A1c today.         History of prescription drug abuse - Primary     New diagnosis since the last time the patient was in clinic.  He is being maintained on Suboxone with good effect.  The mental health component is being handled through weekly counseling and regular attendance at a men's group.         Relevant Medications     buprenorphine-naloxone (SUBOXONE) 8-2 mg Film per sublingual film          Demetris Osman MD  _______________________________    Chief Complaint   Patient presents with     Follow-up     blood pressure      Subjective: Omero oMta is a 36 y.o. year old male who returns to clinic for the following chronic complaints/concerns:     BP:   - has been high regularly.  Today is better than normal   - on suboxone (challenge to find - [facility]).  Prescription pain medications.   - mother recently    - found it to be helpful for emotional pain (divorce, mother being ill)   - seeing a counselor regularly.   Attends a men's group.     - not drinking alcohol.  (6 months sober)   -     Review of systems is negative except for as shown in the HPI.    The following portions of the patient's history were reviewed and updated as appropriate: allergies, current medications, past medical history, past social history and problem list.    Objective:    weight is 221 lb 11.2 oz (100.6 kg). His blood pressure is 150/90 and his pulse is 78.   General: No acute distress  Psych: Affect is somewhat blunted.  Normal rate of speech.  No perseveration.  No tangentiality.  Thinking is logical.  Neatly dressed.    Recent Results (from the past 24 hour(s))   Glycosylated Hemoglobin A1c   Result Value Ref Range    Hemoglobin A1c 6.6 (H) 3.5 - 6.0 %       Additional History from Old Records Summarized (2): no  Decision to Obtain Records (1): no  Radiology Tests Summarized or Ordered (1): no  Labs Reviewed or Ordered (1): yes  Medicine Test Summarized or Ordered (1): no  Independent Review of EKG or X-RAY(2 each): no    This note has been dictated using voice recognition software. Any grammatical or context distortions are unintentional and inherent to the software

## 2021-06-11 NOTE — PROGRESS NOTES
I met with Omero Mota at the request of Dr.St Hong to recheck his blood pressure.  Blood pressure medications on the MAR were reviewed with patient.    Patient has taken all medications as per usual regimen: Yes  Patient reports tolerating them without any issues or concerns: Yes    Vitals:    06/28/17 0815   BP: 128/78   Pulse: 64   Resp: 16       Blood pressure was taken, previous encounter was reviewed, no further follow up needed.        Padmini Richards CMA 6/28/2017 8:14 AM   Thang NAVARRO

## 2021-06-11 NOTE — TELEPHONE ENCOUNTER
[place] database:    Last fill    09/01/2020  #90  08/03/2020  #90  07/07/2020  #90    No other meds noted    Genevieve Gutierrez LPN

## 2021-06-11 NOTE — TELEPHONE ENCOUNTER
[place]-    Last fill    08/03/2020  #90  07/06/2020  #90  06/25/2020  #10    No other meds noted    Genevieve Gutierrez LPN

## 2021-06-11 NOTE — TELEPHONE ENCOUNTER
Controlled Substance Refill Request  Medication Name:   Requested Prescriptions     Pending Prescriptions Disp Refills     buprenorphine-naloxone (SUBOXONE) 8-2 mg Film per sublingual film 90 each 0     Sig: Place 1.5 Film under the tongue 2 (two) times a day.     Date Last Fill: 9/1/20  [facility]  Submit electronically to pharmacy  Controlled Substance Agreement on file:   Encounter-Level CSA Scan Date:    There are no encounter-level csa scan date.        Last office visit:  6/25/20

## 2021-06-11 NOTE — TELEPHONE ENCOUNTER
Controlled Substance Refill Request  Medication Name:   Requested Prescriptions     Pending Prescriptions Disp Refills     buprenorphine-naloxone (SUBOXONE) 8-2 mg Film per sublingual film 90 each 0     Sig: Place 1.5 Film under the tongue 2 (two) times a day.     Date Last Fill: 8/3/20  [facility]  Submit electronically to pharmacy  Controlled Substance Agreement on file:   Encounter-Level CSA Scan Date:    There are no encounter-level csa scan date.        Last office visit:  6/25/20

## 2021-06-12 NOTE — TELEPHONE ENCOUNTER
[place] database:    Last fill    09/29/2020  #90  09/01/2020  #90  08/03/2020  #90    No other meds noted    Genevieve Gutierrez LPN

## 2021-06-12 NOTE — PROGRESS NOTES
Assessment/Plan:    Essential hypertension  This patient's hypertension In goal.   - lifestyle: controlled.  Discussed weight  - cause of hypertension:  Primary Hypertension  - medicaton side effects: no medication side effects noted  - medications: no change  - labs today: basic metabolic panel  - tobacco:     Tobacco Use      Smoking status: Former Smoker        Packs/day: 1.00        Types: Cigarettes      Smokeless tobacco: Current User        Types: Chew    - statin indicated: No        Prediabetes  Check hemoglobin A1c.  Briefly discussed nutrition.    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  No evidence of misuse or diversion.  Urine drug screen today.  Controlled substance agreement completed today.  Follow-up in 2 months.    Return in about 2 months (around 12/5/2020) for Recheck follow-up visit, Med Check - suboxone.    Demetris Osman MD  _______________________________    Chief Complaint   Patient presents with     Follow-up     suboxone      Subjective: Omero Mota is a 40 y.o. year old male who returns to clinic for the following chronic complaints/concerns:     suboxone:   - no side effects.  Constipation which he manages, drowsiness.  No weight gain.  No cravings.  Some dysuria?  Cravings: none.   Previous efforts to taper have not gone well.  Stress will contribute. No alcohol for the past 2 years.    -BP: Patient says that his weight has increased somewhat over the summer.  He is working from home which is resulted in him snacking more often.  He does not check his blood sugar on a regular basis.  No lightheadedness.  No dizziness.  No chest pain.  No chest tightness.  Today, he is drinking a sugared coffee drink.  He says he does not have them very often.    Review of systems is negative except for as shown in the HPI.    The following portions of the patient's history were reviewed and updated as appropriate: allergies, current medications, past medical history and problem  list.    Objective:    weight is 226 lb (102.5 kg) (abnormal). His oral temperature is 98.2  F (36.8  C). His blood pressure is 126/80 and his pulse is 74. His respiration is 18 and oxygen saturation is 98%.   Gen: No acute distress, pleasant.  Cardiac: Regular rate and rhythm, normal S1/S2, no murmurs of the gallops  Respiratory: Clear to auscultation bilaterally.  Psych: Normal affect      No data recorded  No data recorded  No data recorded  No data recorded    No results found for this or any previous visit (from the past 24 hour(s)).    Additional History from Old Records Summarized (2): no  Decision to Obtain Records (1): no  Radiology Tests Summarized or Ordered (1): no  Labs Reviewed or Ordered (1): yes  Medicine Test Summarized or Ordered (1): no  Independent Review of EKG or X-RAY(2 each): no    This note has been dictated using voice recognition software. Any grammatical or context distortions are unintentional and inherent to the software

## 2021-06-12 NOTE — TELEPHONE ENCOUNTER
Controlled Substance Refill Request  Medication Name:   Requested Prescriptions     Pending Prescriptions Disp Refills     buprenorphine-naloxone (SUBOXONE) 8-2 mg Film per sublingual film 90 each 0     Sig: Place 1.5 Film under the tongue 2 (two) times a day.     Date Last Fill: 9/29/20  [facility]  [facility] to [facility]  Controlled Substance Agreement on file:   Encounter-Level CSA Scan Date:    There are no encounter-level csa scan date.        Last office visit:  10/5/20

## 2021-06-13 NOTE — TELEPHONE ENCOUNTER
Controlled Substance Refill Request  Medication Name:   Requested Prescriptions     Pending Prescriptions Disp Refills     buprenorphine-naloxone (SUBOXONE) 8-2 mg Film per sublingual film 90 each 0     Sig: Place 1.5 Film under the tongue 2 (two) times a day.     Date Last Fill: 10/27/20  [facility]  [facility] to [facility]  Controlled Substance Agreement on file:   Encounter-Level CSA Scan Date:    There are no encounter-level csa scan date.        Last office visit:  10/5/20

## 2021-06-13 NOTE — TELEPHONE ENCOUNTER
RN cannot approve Refill Request    RN can NOT refill this medication PCP messaged that patient is overdue for Labs. Last office visit: 10/5/2020 Demetris Osman MD Last Physical: Visit date not found Last MTM visit: Visit date not found Last visit same specialty: 10/5/2020 Demetris Osman MD.  Next visit within 3 mo: Visit date not found  Next physical within 3 mo: Visit date not found      Charity Odonnell, [facility] Triage/Med Refill 11/15/2020    Requested Prescriptions   Pending Prescriptions Disp Refills     metFORMIN (GLUCOPHAGE-XR) 500 MG 24 hr tablet [Pharmacy Med Name: METFORMIN HCL  MG TABLET] 30 tablet 5     Sig: TAKE 1 TABLET BY MOUTH ONCE DAILY WITH SUPPER       Metformin Refill Protocol Failed - 11/14/2020  4:54 PM        Failed - LFT or AST or ALT in last 12 months     Albumin   Date Value Ref Range Status   12/01/2015 4.1 3.5 - 5.0 g/dL Final     Bilirubin, Total   Date Value Ref Range Status   12/01/2015 0.3 0.0 - 1.0 mg/dL Final     Alkaline Phosphatase   Date Value Ref Range Status   12/01/2015 88 45 - 120 U/L Final     AST   Date Value Ref Range Status   12/01/2015 33 0 - 40 U/L Final     ALT   Date Value Ref Range Status   06/17/2019 20 0 - 45 U/L Final     Protein, Total   Date Value Ref Range Status   12/01/2015 7.0 6.0 - 8.0 g/dL Final                Failed - Microalbumin in last year      No results found for: MICROALBUR               Passed - Blood pressure in last 12 months     BP Readings from Last 1 Encounters:   10/05/20 126/80             Passed - GFR or Serum Creatinine in last 6 months     GFR MDRD Non Af Amer   Date Value Ref Range Status   10/05/2020 >60 >60 mL/min/1.73m2 Final     GFR MDRD Af Amer   Date Value Ref Range Status   10/05/2020 >60 >60 mL/min/1.73m2 Final             Passed - Visit with PCP or prescribing provider visit in last 6 months or next 3 months     Last office visit with prescriber/PCP: 10/5/2020 OR same dept: 10/5/2020 Demetris Osman MD  OR same specialty: 10/5/2020 Demetris Osman MD Last physical: Visit date not found Last MTM visit: Visit date not found         Next appt within 3 mo: Visit date not found  Next physical within 3 mo: Visit date not found  Prescriber OR PCP: Demetris Osman MD  Last diagnosis associated with med order: 1. Prediabetes  - metFORMIN (GLUCOPHAGE-XR) 500 MG 24 hr tablet [Pharmacy Med Name: METFORMIN HCL  MG TABLET]; TAKE 1 TABLET BY MOUTH ONCE DAILY WITH SUPPER  Dispense: 30 tablet; Refill: 5     If protocol passes may refill for 12 months if within 3 months of last provider visit (or a total of 15 months).           Passed - A1C in last 6 months     Hemoglobin A1c   Date Value Ref Range Status   10/05/2020 8.2 (H) <=5.6 % Final     Comment:     Prediabetes:   HBA1c       5.7 to 6.4%        Diabetes:        HBA1c        >=6.5%   Patients with Hgb F >5%, total bilirubin >10.0 mg/dL, abnormal red cell turnover, severe renal or hepatic disease or malignancy should not have this A1C method used to diagnose or monitor diabetes.

## 2021-06-13 NOTE — TELEPHONE ENCOUNTER
[place]:    Last fill    10/27/2020  #90  09/29/2020  #90  09/01/2020  #90    No other meds noted    Genevieve Gutierrez LPN

## 2021-06-13 NOTE — TELEPHONE ENCOUNTER
[place]:    Last fill    11/24/2020  #90  10/27/2020  #90  09/29/2020  #90    No other meds noted    Genevieve Gutierrez LPN

## 2021-06-13 NOTE — TELEPHONE ENCOUNTER
Controlled Substance Refill Request  Medication Name:   Requested Prescriptions     Pending Prescriptions Disp Refills     buprenorphine-naloxone (SUBOXONE) 8-2 mg Film per sublingual film 90 each 0     Sig: Place 1.5 Film under the tongue 2 (two) times a day.     Date Last Fill: 11/24/20  [facility]  [facility] to [facility]  Controlled Substance Agreement on file:   Encounter-Level CSA Scan Date:    There are no encounter-level csa scan date.        Last office visit:  10/5/20

## 2021-06-13 NOTE — PROGRESS NOTES
Assessment/Plan:    Problem List Items Addressed This Visit        ENT/CARD/PULM/ENDO Problems    Essential hypertension     Hypertension is improving with treatment.  Continue current treatment regimen.  Blood pressure will be reassessed at the next regular appointment.  BMP today.         Relevant Medications    lisinopril (PRINIVIL,ZESTRIL) 10 MG tablet    Other Relevant Orders    Basic Metabolic Panel    Hypercholesterolemia     The patient will return to clinic in 6 months for a wellness exam and recheck of laboratory tests.  We will risk stratify him and make recommendation for cholesterol-lowering medications at that time.            Other    Tobacco abuse     The patient continues to chew.  We discussed that this is not advisable.  He will work on this in 2018 as he believes he is quick enough illicit substances in 2017.         Prediabetes - Primary     Dramatic improvement with lifestyle changes in the past 4 months.  He will continue to work at weight loss.         Metabolic syndrome     This patient has made dramatically still changes.  I do believe that he does fit criteria for metabolic syndrome and would benefit from metformin as well as stained lifestyle changes.  -Start metformin 500 mg.  -Repeat of labs in 6 months.           Other Visit Diagnoses     Need for influenza vaccination        Relevant Orders    Influenza, Seasonal Quad, Preservative Free 36+ Months        Demetris Osman MD  _______________________________    Chief Complaint   Patient presents with     Follow-up     diabetes      Subjective: Omero Mota is a 37 y.o. year old male who returns to clinic for the following chronic complaints/concerns:     Hypertension/prediabetes:   -This patient returns 4 months after receiving diagnosis of diabetes and reinitiation of treatment for hypertension.  During the past 3 months he has given up sugared soda.  He remains sober and has continued to abstain from alcohol and prescription  opioids.  He is maintained on Suboxone which she tells me is going quite well.  He does admit to eating ice cream each night which helps with his overall set of cravings.  He has lost 12 pounds.  His home life is more stable.  Him and his wife are still / and they share custody.  He tells me that he has his son about 70% of the time given his wife's work schedule.  His son is with him today in clinic.     -He denies cough, chest pain, chest tightness, shortness of breath.  Energy is been increased.  He continues to work his same job.    Review of systems is negative except for as shown in the HPI.    The following portions of the patient's history were reviewed and updated as appropriate: allergies, current medications, past medical history and problem list    Objective:    weight is 209 lb (94.8 kg). His blood pressure is 124/80 and his pulse is 80.   Neuro: No acute distress  Psych: Bright affect.    Recent Results (from the past 24 hour(s))   Glycosylated Hemoglobin A1c   Result Value Ref Range    Hemoglobin A1c 5.5 3.5 - 6.0 %       I have had an Advance Directives discussion with the patient.  Additional History from Old Records Summarized (2): no  Decision to Obtain Records (1): no  Radiology Tests Summarized or Ordered (1): no  Labs Reviewed or Ordered (1): yes  Medicine Test Summarized or Ordered (1): no  Independent Review of EKG or X-RAY(2 each): no    This note has been dictated using voice recognition software. Any grammatical or context distortions are unintentional and inherent to the software

## 2021-06-14 NOTE — TELEPHONE ENCOUNTER
The PDMP was reviewed via the [employer] EHR integration.  There were no unexpected dispenses or prescriptions.    Fill consistent with the patient's clinic visit from last week.

## 2021-06-14 NOTE — TELEPHONE ENCOUNTER
Refill Approved    Rx renewed per Medication Renewal Policy. Medication was last renewed on 12/8/19.    Laurie Benson, [facility] Triage/Med Refill 12/22/2020     Requested Prescriptions   Pending Prescriptions Disp Refills     lisinopriL (PRINIVIL,ZESTRIL) 20 MG tablet [Pharmacy Med Name: LISINOPRIL 20 MG TABLET] 30 tablet 11     Sig: TAKE 1 TABLET BY MOUTH EVERY DAY       [employer] Refill Protocol Passed - 12/20/2020 12:23 AM        Passed - PCP or prescribing provider visit in past 12 months       Last office visit with prescriber/PCP: 10/5/2020 Demetris Osman MD OR same dept: 10/5/2020 Demetris Osman MD OR same specialty: 10/5/2020 Demetris Osman MD  Last physical: Visit date not found Last MTM visit: Visit date not found   Next visit within 3 mo: Visit date not found  Next physical within 3 mo: Visit date not found  Prescriber OR PCP: Demetris Osman MD  Last diagnosis associated with med order: 1. Essential hypertension  - lisinopriL (PRINIVIL,ZESTRIL) 20 MG tablet [Pharmacy Med Name: LISINOPRIL 20 MG TABLET]; TAKE 1 TABLET BY MOUTH EVERY DAY  Dispense: 30 tablet; Refill: 11    If protocol passes may refill for 12 months if within 3 months of last provider visit (or a total of 15 months).             Passed - Serum Potassium in past 12 months     Lab Results   Component Value Date    Potassium 4.5 10/05/2020             Passed - Blood pressure filed in past 12 months     BP Readings from Last 1 Encounters:   10/05/20 126/80             Passed - Serum Creatinine in past 12 months     Creatinine   Date Value Ref Range Status   10/05/2020 0.94 0.70 - 1.30 mg/dL Final

## 2021-06-14 NOTE — TELEPHONE ENCOUNTER
Controlled Substance Refill Request  Medication Name:   Requested Prescriptions     Pending Prescriptions Disp Refills     buprenorphine-naloxone (SUBOXONE) 8-2 mg Film per sublingual film 90 each 0     Sig: Place 1.5 Film under the tongue 2 (two) times a day.     Date Last Fill: 12/23/20  Requested [facility]: [facility]  Submit electronically to pharmacy  Controlled Substance Agreement on file:   Encounter-Level CSA Scan Date:    There are no encounter-level csa scan date.        Last office visit:  1/7/21

## 2021-06-14 NOTE — PROGRESS NOTES
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  No obvious side effects (weight gain?).  I think he has some symptoms of replacing substance abuse with food which may be contributory towards his new diagnosis of diabetes.  No evidence of misuse or diversion.  We will plan to continue current dose.  2-month clinic visit recommended.    Type 2 diabetes mellitus with hyperglycemia, without long-term current use of insulin (H)  Hemoglobin A1c remains elevated.  Diagnosis of type 2 diabetes discussed today.  We reviewed that this is an intolerance to carbohydrates in processed food.  The patient is aware that this is a consequence of the low quality diet he is currently consuming.  He has not eaten well for the majority of his life although he says that he does like some foods that he considers healthy.  He has an 18-year-old son we states eats incredibly healthy and that would help him improve nutrition.  -Increase Metformin.  -We discussed dietary framework to address insulin resistance, excessive calories.  I suggested a high fat, low carbohydrate framework and he is willing to try.  He will also introduce some element of time restricted eating.  -Patient refused glucometer.  Will consider at future time.  -Discussed need to discontinue tobacco use (chew)  -Start aspirin  -Start low-dose statin  -We will plan to get fasting lab work in the near future  -2-month follow-up recommended to review progress.  We will base additional recommendations on weight, adherence to dietary principles discussed today.  Consider adding GLP-1 receptor agonist (Ozempic?).      Return in about 2 months (around 3/7/2021) for Recheck follow-up visit, (Video Visit).    Demetris Osmna MD  _______________________________    Chief Complaint   Patient presents with     Follow-up     diabetes and suboxone      Subjective: Omero Mota is a 40 y.o. year  "old male who returns to clinic for the following chronic complaints/concerns:     DM follow-up   - \"its going to be high.\"   - the patient is motivated to loose weight before trip to [place] in March.  Planning to lose weight.   - nutrition/beverage: ad nicole eating.  \"Eating like crap.\" Lots of fast food.  Snacks.     - weight: \"Still too high.\"  - fast food 4-5 x/week    Suboxone: stable. Doing okay.     Review of systems is negative except for as shown in the HPI.    The following portions of the patient's history were reviewed and updated as appropriate: allergies, current medications, past medical history and problem list.    Objective:    height is 5' 7\" (1.702 m) and weight is 227 lb (103 kg) (abnormal). His oral temperature is 98  F (36.7  C). His blood pressure is 118/62 and his pulse is 90. His respiration is 20 and oxygen saturation is 98%.   Video visit.   General: No acute distress  Skin: No rashes or lesions noted on the face and hands.  Anicteric.  Respiratory: Non-labored breathing.  Psych: Normal affect.  Normal rate of speech.  No tangentiality.  Thinking is logical.  Neurologic: Cranial nerves II through XII grossly intact.    No data recorded  No data recorded  No data recorded  No data recorded    Recent Results (from the past 24 hour(s))   Glycosylated Hemoglobin A1c   Result Value Ref Range    Hemoglobin A1c 8.1 (H) <=5.6 %       This note has been dictated using voice recognition software. Any grammatical or context distortions are unintentional and inherent to the software  "

## 2021-06-14 NOTE — PATIENT INSTRUCTIONS - HE
Interested articles on nutrition:    Saturated fat: Saturated Fats and Health: A Reassessment and Proposal for Food-Based Recommendations: [facility] State-of-the-Art Review June (https://www.jacc.org/doi/full/10.1016/j.jacc.2020.05.077)    A Pesco-Mediterranean Diet With Intermittent Fasting: [facility] Review Topic of the Week (https://www.jacc.org/doi/full/10.1016/j.jacc.2020.07.049)

## 2021-06-15 NOTE — TELEPHONE ENCOUNTER
Controlled Substance Refill Request  Medication Name:   Requested Prescriptions     Pending Prescriptions Disp Refills     buprenorphine-naloxone (SUBOXONE) 8-2 mg Film per sublingual film 90 each 0     Sig: Place 1.5 Film under the tongue 2 (two) times a day.     Date Last Fill: 1/20/21  [facility]  Submit electronically to pharmacy  Controlled Substance Agreement on file:   Encounter-Level CSA Scan Date:    There are no encounter-level csa scan date.        Last office visit:  1/7/21

## 2021-06-16 PROBLEM — R39.9 LOWER URINARY TRACT SYMPTOMS (LUTS): Status: ACTIVE | Noted: 2019-03-29

## 2021-06-16 PROBLEM — E88.810 METABOLIC SYNDROME: Status: ACTIVE | Noted: 2017-11-02

## 2021-06-16 PROBLEM — F19.20: Status: ACTIVE | Noted: 2017-06-14

## 2021-06-16 NOTE — TELEPHONE ENCOUNTER
Controlled Substance Refill Request  Medication Name:   Requested Prescriptions     Pending Prescriptions Disp Refills     buprenorphine-naloxone (SUBOXONE) 8-2 mg Film per sublingual film 90 each 0     Sig: Place 1.5 Film under the tongue 2 (two) times a day.     Date Last Fill: 2/18/21  [facility]  [facility] to [facility]  Controlled Substance Agreement on file:   Encounter-Level CSA Scan Date:    There are no encounter-level csa scan date.        Last office visit:  1/7/21

## 2021-06-16 NOTE — TELEPHONE ENCOUNTER
Telephone Encounter by Jennifer Pat LPN at 8/15/2019 11:06 AM     Author: Jennifer Pat LPN Service: -- Author Type: Licensed Nurse    Filed: 8/15/2019 11:16 AM Encounter Date: 8/13/2019 Status: Signed    : Jennifer Pat LPN (Licensed Nurse)       Patient Returning Call  Reason for call:  Message from clinic  Information relayed to patient:  Demetris Osman MD   to Omero Mota            8/13/19 12:34 PM   There will be a refill at your pharmacy for next week.  We should still plan on follow-up visit in late August for the first week of September.      Patient has additional questions:  No  If YES, what are your questions/concerns: n/a    Patient was transferred to scheduling to see Dr. Patino.  Okay to leave a detailed message?: No call back needed

## 2021-06-16 NOTE — TELEPHONE ENCOUNTER
The PDMP was reviewed via the [employer] EHR integration.  There were no unexpected dispenses or prescriptions.

## 2021-06-16 NOTE — PROGRESS NOTES
Assessment/Plan:      Type 2 diabetes mellitus with hyperglycemia, without long-term current use of insulin (H)  The patient has been working to reduce his blood sugars.  Primarily, he is practicing fasting and overall reduction in calories.  He is also eating more food prepared in the home.  It is premature to check a hemoglobin A1c and he does not check his blood sugars.  We discussed that his efforts might be easier with medication such as semaglutide.  He declines at this time.  I also offered him a continuous glucose monitor (which would be paid out of pocket) to help with food selection decision making.  He declines.  We will plan on completing a hemoglobin A1c in about a month and then following up with a video visit in 2 months to discuss progress.  Blood pressures well controlled.  He is taking an aspirin.  He is also taking a statin.  Anticipate improvement with his hemoglobin A1c when it is measured in the near future.    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  No evidence of misuse or diversion.  No side effects.  Continue current therapy.  Drugs of abuse screen today consistent with buprenorphine use.  We will decrease the frequency of visits.  Follow-up via video in 2 months and face-to-face in 4 months.      Return in about 2 months (around 5/25/2021) for Med Check - suboxone, (Video Visit).    Demetris Osman MD  _______________________________    Chief Complaint   Patient presents with     Follow-up     diabetes and suboxone      Subjective: Omero Mota is a 40 y.o. year old male who returns to clinic for the following chronic complaints/concerns:     DM:   - fasting regimen.  Weight was down to 214.  He says that he feels good.  He has eliminated sugar.  Cooking more at home.      Med check suboxone is tyler well    Review of systems is negative except for as shown in the HPI.    The following portions of the patient's history were reviewed and updated as  appropriate: allergies, current medications, past medical history and problem list.    Objective:    weight is 219 lb (99.3 kg). His blood pressure is 118/74 and his pulse is 64. His respiration is 16 and oxygen saturation is 99%.   Physical Exam  Constitutional:       General: He is not in acute distress.     Appearance: Normal appearance.   HENT:      Head: Normocephalic and atraumatic.   Eyes:      General: No scleral icterus.     Conjunctiva/sclera: Conjunctivae normal.   Pulmonary:      Effort: Pulmonary effort is normal.   Skin:     Findings: No rash.   Neurological:      General: No focal deficit present.      Mental Status: He is alert and oriented to person, place, and time.   Psychiatric:         Mood and Affect: Mood normal.         Behavior: Behavior normal.         No data recorded  No data recorded  No data recorded  No data recorded    Recent Results (from the past 24 hour(s))   Clinic Urine Drug Screen-RLN/STW Only   Result Value Ref Range    Amphetamine Screen Negative Screen Negative    Barbiturates Screen Negative Screen Negative    Buprenorphine Screen Positive (!) Screen Negative    Benzodiazepines Screen Negative Screen Negative    Cocaine Screen Negative Screen Negative    Methadone Metabolite Screen Negative Screen Negative    Ecstasy Screen Negative Screen Negative    Methamphetamine Screen Negative Screen Negative    Opiates Screen Negative Screen Negative    Oxycodone Screen Negative Screen Negative    PCP 25 Screen Negative Screen Negative    Marijuana Screen Negative Screen Negative    Specific Gravity 1.015 1.003 - 1.030    pH 4.0 (L) 5.0 - 8.0    Oxidants Normal Normal       This note has been dictated using voice recognition software. Any grammatical or context distortions are unintentional and inherent to the software

## 2021-06-16 NOTE — TELEPHONE ENCOUNTER
The PDMP was reviewed via the [employer] EHR integration.  There were no unexpected dispenses or prescriptions.

## 2021-06-16 NOTE — TELEPHONE ENCOUNTER
Controlled Substance Refill Request  Medication Name:   Requested Prescriptions     Pending Prescriptions Disp Refills     buprenorphine-naloxone (SUBOXONE) 8-2 mg Film per sublingual film 90 each 0     Sig: Place 1.5 Film under the tongue 2 (two) times a day.     Date Last Fill: 3/18/21  [facility]  [facility] to [facility]  Controlled Substance Agreement on file:   Encounter-Level CSA Scan Date:    There are no encounter-level csa scan date.        Last office visit:  3/25/21

## 2021-06-16 NOTE — PATIENT INSTRUCTIONS - HE
Consider adding a medicine.   - glipizide.   - semaglutide: this will make it hard to eat big meals.  You will feel full.  Food stays in your longer.  This is a weight loss medicine.  $$$$$    Or consider adding continuous glucose monitor.

## 2021-06-17 NOTE — TELEPHONE ENCOUNTER
Controlled Substance Refill Request  Medication Name:   Requested Prescriptions     Pending Prescriptions Disp Refills     buprenorphine-naloxone (SUBOXONE) 8-2 mg Film per sublingual film 90 each 0     Sig: Place 1.5 Film under the tongue 2 (two) times a day.     Date Last Fill: 4/15/21  [facility]  [facility] to [facility]  Controlled Substance Agreement on file:   Encounter-Level CSA Scan Date:    There are no encounter-level csa scan date.        Last office visit:  3/25/21

## 2021-06-17 NOTE — PROGRESS NOTES
"Type of service:  Video Visit    Omero Mota is a 40 y.o. male who is being evaluated via a billable video visit.      How would you like to obtain your AVS? MyChart.  If dropped from the video visit, the video invitation should be resent by: Text to cell phone: [phone]  Will anyone else be joining your video visit? No    Video Start Time: 12:55 PM  Video End Time (time video stopped): 1:03 PM  Originating Location (pt. Location): Home  Distant Location (provider location):  [facility]   Platform used for Video Visit: [employer]    Assessment/Plan:    Drug dependence on maintenance agonist therapy, no symptoms (H)  Doing well.  Symptoms controlled with agonist therapy.  No side effects.  Bowel movements have been stable.  No changes to plan recommended today.  He will have a face-to-face visit in roughly 1 month.  Urine drug screen will be performed on that day.  Due for diabetic checkup as well which will be completed on that day.    Alcohol dependence in remission (H)  In remission.  No alcohol.  Stable.    Return in about 4 weeks (around 6/21/2021) for Med Check - suboxone, (face to face).    Demetris Osman MD  _______________________________    Chief Complaint   Patient presents with     Follow-up     suboxone      Subjective: Omero Mota is a 40 y.o. year old male who returns to clinic for the following chronic complaints/concerns:     suboxone:   - doing well. No cravings.  No using of substances.  No alcohol.  No side effects.  BMs okay.  \"Normal.\"  DM control going okay. Eating better.  Weight has been stable.      Review of systems is negative except for as shown in the HPI.    The following portions of the patient's history were reviewed and updated as appropriate: allergies, current medications, past medical history and problem list.    Objective:    vitals were not taken for this visit.   Physical Exam  Constitutional:       General: He is not in acute " distress.     Appearance: Normal appearance.   HENT:      Head: Normocephalic and atraumatic.   Eyes:      General: No scleral icterus.     Conjunctiva/sclera: Conjunctivae normal.   Pulmonary:      Effort: Pulmonary effort is normal.   Skin:     Findings: No rash.   Neurological:      General: No focal deficit present.      Mental Status: He is alert and oriented to person, place, and time.   Psychiatric:         Mood and Affect: Mood normal.         Behavior: Behavior normal.         PDMP reviewed.     No data recorded  No data recorded  No data recorded  No data recorded    No results found for this or any previous visit (from the past 24 hour(s)).    This note has been dictated using voice recognition software. Any grammatical or context distortions are unintentional and inherent to the software

## 2021-06-18 NOTE — PROGRESS NOTES
Assessment/Plan:    Problem List Items Addressed This Visit        ENT/CARD/PULM/ENDO Problems    Essential hypertension     Elevated blood pressure checks at his Suboxone clinic.  -Increase lisinopril to 20 mg.  -Check basic metabolic panel.         Relevant Medications    lisinopril (PRINIVIL,ZESTRIL) 20 MG tablet    Other Relevant Orders    Basic Metabolic Panel    Hypercholesterolemia     Patient here for diabetic check.  He is fasting today and has been possibly 1 year since his last measurement.  Check lipids today.         Relevant Orders    Lipid Profile       Other    BMI 36.0-36.9,adult    Relevant Orders    Vitamin D, Total (25-Hydroxy)    Hemoglobin (Completed)    Prediabetes - Primary     This patient has prediabetes.  His hemoglobin A1c is unchanged than previous measurements.     - continue medications: oral agent (monotherapy): metformin (generic)   - medications discontinued: none   - aspirin: NO   - blood pressure control: Good   - statin: NO and not indicated given age.   - tobacco use: NO   - physical activity: discussed improving his level of activity   - DM follow-up: 3 months for lab only visit  Follow-up in clinic in 6 months.         Relevant Orders    Glycosylated Hemoglobin A1c (Completed)    Basic Metabolic Panel    Metabolic syndrome    Relevant Orders    Basic Metabolic Panel    Lipid Profile      Other Visit Diagnoses     Fatigue        Relevant Orders    Vitamin D, Total (25-Hydroxy)    Hemoglobin (Completed)        No Follow-up on file.    Demetris Osman MD  _______________________________    Chief Complaint   Patient presents with     Follow-up     pre-diabetes      Subjective: Omero Mota is a 37 y.o. year old male who returns to clinic for the following chronic complaints/concerns:     DM follow-up:   -This patient returns for follow-up.  His most recently seen 6 months ago.  At that time, he was focused on diet improvement and weight loss.  Since that time, he has been  more permissive with his food choices, often consuming carbs in the form of bread and ice cream.  He has not been exercising.  He actually feels fatigued does not have much energy which he attributes to poor stamina.  He continues on Suboxone for opioid addiction maintenance.  Denies chest pain, chest tightness, shortness of breath.  Blood pressure is been intermittently elevated at his Suboxone clinic with some systolic blood pressures in the 160s.  No lightheadedness.  No dizziness.  Non-smoker at this time.    Review of systems is negative except for as shown in the HPI.    The following portions of the patient's history were reviewed and updated as appropriate: allergies, current medications, past medical history and problem list.    Objective:    weight is 222 lb (100.7 kg). His blood pressure is 130/80 and his pulse is 80.   General: No acute distress  Skin: No acanthosis nigricans.  Cardiac: Regular rate and rhythm, normal S1/S2  Respiratory: Clear to auscultation bilaterally.  Extremities: No edema the ankles bilaterally.    Recent Results (from the past 24 hour(s))   Glycosylated Hemoglobin A1c   Result Value Ref Range    Hemoglobin A1c 6.4 (H) 3.5 - 6.0 %   Hemoglobin   Result Value Ref Range    Hemoglobin 14.0 14.0 - 18.0 g/dL     Additional History from Old Records Summarized (2): no  Decision to Obtain Records (1): no  Radiology Tests Summarized or Ordered (1): no  Labs Reviewed or Ordered (1): yes  Medicine Test Summarized or Ordered (1): no  Independent Review of EKG or X-RAY(2 each): no    This note has been dictated using voice recognition software. Any grammatical or context distortions are unintentional and inherent to the software

## 2021-06-18 NOTE — PROGRESS NOTES
HISTORY OF PRESENT ILLNESS  Patient reports that he has a history of ear tubes. He would like to have fluid drained and to have the tubes replaced. His hearing is decreased. No drainage. No pain but he does have fullness.    REVIEW OF SYSTEMS  Review of Systems: a 10-system review was performed. Pertinent positives are noted in the HPI and on a separate scanned document in the chart.    PMH, PSH, FH and SH has documented in the EHR.      EXAM    CONSTITUTIONAL  General Appearance:  Normal, well developed, well nourished, no obvious distress  Ability to Communicate:  communicates appropriately.    HEAD AND FACE  Appearance and Symmetry:  Normal, no scalp or facial scarring or suspicious lesions.  Paranasal sinuses tenderness:  Normal, Paranasal sinuses non tender    EARS  Clinical speech reception threshold:  Normal, able to hear normal speech.  Auricle:  Normal, Auricles without scars, lesions, masses.  External auditory canal:  Normal, External auditory canal normal.  Tympanic membrane:  Bilateral middle ear fluid.    NOSE (speculum or scope)  Architecture:  Normal, Grossly normal external nasal architecture with no masses or lesions.  Mucosa:  Normal mucosa, No polyps or masses.  Septum:  Normal, Septum non-obstructing.  Turbinates:  Normal, No turbinate abnormalities    ORAL CAVITY AND OROPHARYNX  Lips:  Normal.  Dental and gingiva:  Normal, No obvious dental or gingival disease.  Mucosa:  Normal, Moist mucous membranes.  Tongue:  Normal, Tongue mobile with no mucosal abnormalities  Hard and soft palate:  Normal, Hard and soft palate without cleft or mucosal lesions.  Oral pharynx:  Normal, Posterior pharynx without lesions or remarkable asymmetry.  Saliva:  Normal, Clear saliva.  Masses:  Normal, No palpable masses or pathologically enlarged lymph nodes.    NECK  Masses/lymph nodes:  Normal, No worrisome neck masses or lymph nodes.  Salivary glands:  Normal, Parotid and submandibular glands.  Trachea and larynx  position:  Normal, Trachea and larynx midline.  Thyroid:  Normal, No thyroid abnormality.  Tenderness:  Normal, No cervical tenderness.  Suppleness:  Normal, Neck supple    NEUROLOGICAL  Speech pattern:  Normal, Proasaic    RESPIRATORY  Symmetry and Respiratory effort:  Normal, Symmetric chest movement and expansion with no increased intercostal retractions or use of accessory muscles.     IMPRESSION  Bilateral middle ear fluid    RECOMMENDATION  I discussed BMT and the patient expressed a desire to have them replaced. He was taken to the treatment room. The left ear was examined under the microscope. A small amount of phenol was applied to the posterior inferior quadrant. A radial incision was made and middle ear fluid removed. A tube was placed and the patient tolerated the procedure with problems. A similar procedure was performed on the opposite side. At the completion of the procedure, the patient noted relief of his symptoms.    Amador Shelton MD

## 2021-06-19 NOTE — LETTER
Letter by Demetris Osman MD at      Author: Demetris Osman MD Service: -- Author Type: --    Filed:  Encounter Date: 10/7/2019 Status: Signed         [facility] MEDICINE/OB  10/07/19    Patient: Omero Mota  YOB: 1980  Medical Record Number: [id number]                                                                  Opioid / Opioid Plus Controlled Substance Agreement    I understand that my care provider has prescribed an opioid (narcotic) controlled substance to help manage my condition(s). I am taking this medicine to help me function or work. I know this is strong medicine, and that it can cause serious side effects. Opioid medicine can be sedating, addicting and may cause a dependency on the drug. They can affect my ability to drive or think, and cause depression. They need to be taken exactly as prescribed. Combining opioids with certain medicines or chemicals (such as cocaine, sedatives and tranquilizers, sleeping pills, meth) can be dangerous or even fatal. Also, if I stop opioids suddenly, I may have severe withdrawal symptoms. Last, I understand that opioids do not work for all types of pain nor for all patients. If not helpful, I may be asked to stop them.      The risks, benefits, and side effects of these medicine(s) were explained to me. I agree that:    1. I will take part in other treatments as advised by my care team. This may be psychiatry or counseling, physical therapy, behavioral therapy, group treatment or a referral to a pain clinic. I will reduce or stop my medicine when my care team tells me to do so.  2. I will take my medicines as prescribed. I will not change the dose or schedule unless my care team tells me to. There will be no refills if I run out early.  I may be contactedwithout warning and asked to complete a urine drug test or pill count at any time.   3. I will keep all my appointments, and understand this is part of the monitoring of opioids. My  care team may require an office visit for EVERY opioid/controlled substance refill. If I miss appointments or dont follow instructions, my care team may stop my medicine.  4. I will not ask other providers to prescribe controlled substances, and I will not accept controlled substances from other people. If I need another prescribed controlled substance for a new reason, I will tell my care team within 1 business day.  5. I will use one pharmacy to fill all of my controlled substance prescriptions, and it is up to me to make sure that I do not run out of my medicines on weekends or holidays. If my care team is willing to refill my opioid prescription without a visit, I must request refills only during office hours, refills may take up to 3 days to process, and it may take up to 5 to 7 days for my medicine to be mailed and ready at my pharmacy. Prescriptions will not be mailed anywhere except my pharmacy.            [id number]  Rev 12/18         Registration to scan to EHR                             Page 1 of 2               Controlled Substance Agreement Opioid        [facility]/OB  10/07/19  Patient: Omero Mota  YOB: 1980  Medical Record Number: [id number]                                                                  6. I am responsible for my prescriptions. If the medicine/prescription is lost or stolen, it will not be replaced. I also agree not to share controlled substance medicines with anyone.  7. I agree to not use ANY illegal or recreational drugs. This includes marijuana, cocaine, bath salts or other drugs. I agree not to use alcohol unless my care team says I may.          I agree to give urine samples whenever asked. If I dont give a urine sample, the care team may stop my medicine.    8. If I enroll in the [place] Medical Marijuana program, I will tell my care team. I will also sign an agreement to share my medical records with my care team.   9. I will bring in my  list of medicines (or my medicine bottles) each time I come to the clinic.   10. I will tell my care team right away if I become pregnant or have a new medical problem treated outside of my regular clinic.  11. I understand that this medicine can affect my thinking and judgment. It may be unsafe for me to drive, use machinery and do dangerous tasks. I will not do any of these things until I know how the medicine affects me. If my dose changes, I will wait to see how it affects me. I will contact my care team if I have concerns about medicine side effects.    I understand that if I do not follow any of the conditions above, my prescriptions or treatment may be stopped.      I agree that my provider, clinic care team, and pharmacy may work with any city, state or federal law enforcement agency that investigates the misuse, sale, or other diversion of my controlled medicine. I will allow my provider to discuss my care with or share a copy of this agreement with any other treating provider, pharmacy or emergency room where I receive care. I agree to give up (waive) any right of privacy or confidentiality with respect to these consents.     I have read this agreement and have asked questions about anything I did not understand.      ________________________________________________________________________  Patient signature - Date/Time -  Omero Mota                                      ________________________________________________________________________  Witness signature                                                            ________________________________________________________________________  Provider signature - Demetris Osman MD      [id number]  Rev 12/18         Registration to scan to EHR                         Page 2 of 2                   Controlled Substance Agreement Opioid           Page 1 of 2  Opioid Pain Medicines (also known as Narcotics)  What You Need to Know    What are opioids?    Opioids are pain medicines that must be prescribed by a doctor.  They are also known as narcotics.    Examples are:     morphine (MS Contin, Nuvia)    oxycodone (Oxycontin)    oxycodone and acetaminophen (Percocet)    hydrocodone and acetaminophen (Vicodin, Norco)     fentanyl patch (Duragesic)     hydromorphone (Dilaudid)     methadone     What do opioids do well?   Opioids are best for short-term pain after a surgery or injury. They also work well for cancer pain. Unlike other pain medicines, they do not cause liver or kidney failure or ulcers. They may help some people with long-lasting (chronic) pain.     What do opioids NOT do well?   Opioids never get rid of pain entirely, and they do not work well for most patients with chronic pain. Opioids do not reduce swelling, one of the causes of pain. They also dont work well for nerve pain.                           For informational purposes only.  Not to replace the advice of your care provider.  Copyright [address] [facility]. All right reserved. Superb 663348-Jtv 02/18.      Page 2 of 2    Risks and side effects   Talk to your doctor before you start or decide to keep taking one of these medicines. Side effects include:    Lowering your breathing rate enough to cause death    Overdose, including death, especially if taking higher than prescribed doses    Long-term opioid use    Worse depression symptoms; less pleasure in things you usually enjoy    Feeling tired or sluggish    Slower thoughts or cloudy thinking    Being more sensitive to pain over time; pain is harder to control    Trouble sleeping or restless sleep    Changes in hormone levels (for example, less testosterone)    Changes in sex drive or ability to have sex    Constipation    Unsafe driving    Itching and sweating    Feeling dizzy    Nausea, vomiting and dry mouth    What else should I know about opioids?  When someone takes opioids for too long or too often, they become dependent.  This means that if you stop or reduce the medicine too quickly, you will have withdrawal symptoms.    Dependence is not the same as addiction. Addiction is when people keep using a substance that harms their body, their mind or their relations with others. If you have a history of drug or alcohol abuse, taking opioids can cause a relapse.    Over time, opioids dont work as well. Most people will need higher and higher doses. The higher the dose, the more serious the side effects. We dont know the long-term effects of opioids.      Prescribed opioids aren't the best way to manage chronic pain    Other ways to manage pain include:      Ibuprofen or acetaminophen.  You should always try this first.      Treat health problems that may be causing pain.      acupuncture or massage, deep breathing, meditation, visual imagery, aromatherapy.      Use heat or ice at the pain site      Physical therapy and exercise      Stop smoking      See a counselor or therapist                                                  People who have used opioids for a long time may have a lower quality of life, worse depression, higher levels of pain and more visits to doctors.    Never share your opioids with others. Be sure to store opioids in a secure place, locked if possible.Young children can easily swallow them and overdose.     You can overdose on opioids.  Signs of overdose include decrease or loss of consciousness, slowed breathing, trouble waking and blue lips.  If someone is worried about overdose, they should call 911.    If you are at risk for overdose, you may get naloxone (Narcan, a medicine that reverses the effects of opioids.  If you overdose, a friend or family member can give you Narcan while waiting for the ambulance.  They need to know the signs of overdose and how to give Narcan.    While you're taking opioids:    Don't use alcohol or street drugs. Taking them together can cause death.    Don't take any of these medicines  unless your doctor says its okay.  Taking these with opioids can cause death.    Benzodiazepines (such as lorazepam         or diazepam)    Muscle relaxers (such as cyclobenzaprine)    sleeping pills    other opioids    Safe disposal of opioids  Find your area drug take-back program, your pharmacy mail-back program, buy a special disposal bag (such as Deterra) from your pharmacy or flush them down the toilet.  Use the guidelines at:  www.fda.gov/drugs/resourcesforyou

## 2021-06-19 NOTE — PROGRESS NOTES
HISTORY OF PRESENT ILLNESS  Patient reports that he noted a tube that came out in the right ear. He reports that he always needs a tube in the ear or the fluid will return. He wants to consider replacing the tube at this time.     REVIEW OF SYSTEMS  Review of Systems: a 10-system review was performed. Pertinent positives are noted in the HPI and on a separate scanned document in the chart.    PMH, PSH, FH and SH has documented in the EHR.      EXAM    CONSTITUTIONAL  General Appearance:  Normal, well developed, well nourished, no obvious distress  Ability to Communicate:  communicates appropriately.    HEAD AND FACE  Appearance and Symmetry:  Normal, no scalp or facial scarring or suspicious lesions.    EARS  Clinical speech reception threshold:  Normal, able to hear normal speech.  Auricle:  Normal, Auricles without scars, lesions, masses.  External auditory canal:  Normal, External auditory canal normal.  Tympanic membrane:  Right TM appears normal. Left Tube in place and patent.     NEUROLOGICAL  Speech pattern:  Normal, Proasaic    RESPIRATORY  Symmetry and Respiratory effort:  Normal, Symmetric chest movement and expansion with no increased intercostal retractions or use of accessory muscles.     IMPRESSION  Extruded PE tube right ear.    RECOMMENDATION  I advised holding off for now. He is asymptomatic and the TM appears normal. I explained that we can wait until the middle ear fluid returns. I advised that he follow up ASAP when the fluid returns and we can replace the PE tube.    Amador Shelton MD

## 2021-06-20 NOTE — LETTER
Letter by eDmetris Osman MD at      Author: Demetris Osman MD Service: -- Author Type: --    Filed:  Encounter Date: 10/5/2020 Status: (Other)         [facility]  10/05/20    Patient: Omero Mota  YOB: 1980  Medical Record Number: [id number]                                                                  Opioid / Opioid Plus Controlled Substance Agreement    I understand that my care provider has prescribed an opioid (narcotic) controlled substance to help manage my condition(s). I am taking this medicine to help me function or work. I know this is strong medicine, and that it can cause serious side effects. Opioid medicine can be sedating, addicting and may cause a dependency on the drug. They can affect my ability to drive or think, and cause depression. They need to be taken exactly as prescribed. Combining opioids with certain medicines or chemicals (such as cocaine, sedatives and tranquilizers, sleeping pills, meth) can be dangerous or even fatal. Also, if I stop opioids suddenly, I may have severe withdrawal symptoms. Last, I understand that opioids do not work for all types of pain nor for all patients. If not helpful, I may be asked to stop them.        The risks, benefits, and side effects of these medicine(s) were explained to me. I agree that:    1. I will take part in other treatments as advised by my care team. This may be psychiatry or counseling, physical therapy, behavioral therapy, group treatment or a referral to a pain clinic. I will reduce or stop my medicine when my care team tells me to do so.  2. I will take my medicines as prescribed. I will not change the dose or schedule unless my care team tells me to. There will be no refills if I run out early.  I may be contactedwithout warning and asked to complete a urine drug test or pill count at any time.   3. I will keep all my appointments, and understand this is part of the monitoring of  opioids. My care team may require an office visit for EVERY opioid/controlled substance refill. If I miss appointments or dont follow instructions, my care team may stop my medicine.  4. I will not ask other providers to prescribe controlled substances, and I will not accept controlled substances from other people. If I need another prescribed controlled substance for a new reason, I will tell my care team within 1 business day.  5. I will use one pharmacy to fill all of my controlled substance prescriptions, and it is up to me to make sure that I do not run out of my medicines on weekends or holidays. If my care team is willing to refill my opioid prescription without a visit, I must request refills only during office hours, refills may take up to 3 days to process, and it may take up to 5 to 7 days for my medicine to be mailed and ready at my pharmacy. Prescriptions will not be mailed anywhere except my pharmacy.        [id number]  Rev 12/18         Registration to scan to EHR                             Page 1 of 2               Controlled Substance Agreement [facility]  10/05/20  Patient: Omero Mota  YOB: 1980  Medical Record Number: [id number]                                                                  6. I am responsible for my prescriptions. If the medicine/prescription is lost or stolen, it will not be replaced. I also agree not to share controlled substance medicines with anyone.  7. I agree to not use ANY illegal or recreational drugs. This includes marijuana, cocaine, bath salts or other drugs. I agree not to use alcohol unless my care team says I may.          I agree to give urine samples whenever asked. If I dont give a urine sample, the care team may stop my medicine.    8. If I enroll in the [place] Medical Marijuana program, I will tell my care team. I will also sign an agreement to share my medical records with my care team.   9. I will  bring in my list of medicines (or my medicine bottles) each time I come to the clinic.   10. I will tell my care team right away if I become pregnant or have a new medical problem treated outside of my regular clinic.  11. I understand that this medicine can affect my thinking and judgment. It may be unsafe for me to drive, use machinery and do dangerous tasks. I will not do any of these things until I know how the medicine affects me. If my dose changes, I will wait to see how it affects me. I will contact my care team if I have concerns about medicine side effects.    I understand that if I do not follow any of the conditions above, my prescriptions or treatment may be stopped.      I agree that my provider, clinic care team, and pharmacy may work with any city, state or federal law enforcement agency that investigates the misuse, sale, or other diversion of my controlled medicine. I will allow my provider to discuss my care with or share a copy of this agreement with any other treating provider, pharmacy or emergency room where I receive care. I agree to give up (waive) any right of privacy or confidentiality with respect to these consents.     I have read this agreement and have asked questions about anything I did not understand.      ________________________________________________________________________  Patient signature - Date/Time -  Omero Mota                                      ________________________________________________________________________  Witness signature                                                            ________________________________________________________________________  Provider signature - Demetris Osman MD      [id number]  Rev 12/18         Registration to scan to EHR                         Page 2 of 2                   Controlled Substance Agreement Opioid           Page 1 of 2  Opioid Pain Medicines (also known as Narcotics)  What You Need to Know    What are  opioids?   Opioids are pain medicines that must be prescribed by a doctor.  They are also known as narcotics.    Examples are:     morphine (MS Contin, Nuvia)    oxycodone (Oxycontin)    oxycodone and acetaminophen (Percocet)    hydrocodone and acetaminophen (Vicodin, Norco)     fentanyl patch (Duragesic)     hydromorphone (Dilaudid)     methadone     What do opioids do well?   Opioids are best for short-term pain after a surgery or injury. They also work well for cancer pain. Unlike other pain medicines, they do not cause liver or kidney failure or ulcers. They may help some people with long-lasting (chronic) pain.     What do opioids NOT do well?   Opioids never get rid of pain entirely, and they do not work well for most patients with chronic pain. Opioids do not reduce swelling, one of the causes of pain. They also dont work well for nerve pain.                           For informational purposes only.  Not to replace the advice of your care provider.  Copyright [facility]. All right reserved. [employer] [id number] 02/18.      Page 2 of 2    Risks and side effects   Talk to your doctor before you start or decide to keep taking one of these medicines. Side effects include:    Lowering your breathing rate enough to cause death    Overdose, including death, especially if taking higher than prescribed doses    Long-term opioid use    Worse depression symptoms; less pleasure in things you usually enjoy    Feeling tired or sluggish    Slower thoughts or cloudy thinking    Being more sensitive to pain over time; pain is harder to control    Trouble sleeping or restless sleep    Changes in hormone levels (for example, less testosterone)    Changes in sex drive or ability to have sex    Constipation    Unsafe driving    Itching and sweating    Feeling dizzy    Nausea, vomiting and dry mouth    What else should I know about opioids?  When someone takes opioids for too long or too often, they become  dependent. This means that if you stop or reduce the medicine too quickly, you will have withdrawal symptoms.    Dependence is not the same as addiction. Addiction is when people keep using a substance that harms their body, their mind or their relations with others. If you have a history of drug or alcohol abuse, taking opioids can cause a relapse.    Over time, opioids dont work as well. Most people will need higher and higher doses. The higher the dose, the more serious the side effects. We dont know the long-term effects of opioids.      Prescribed opioids aren't the best way to manage chronic pain    Other ways to manage pain include:      Ibuprofen or acetaminophen.  You should always try this first.      Treat health problems that may be causing pain.      acupuncture or massage, deep breathing, meditation, visual imagery, aromatherapy.      Use heat or ice at the pain site      Physical therapy and exercise      Stop smoking      See a counselor or therapist                                                  People who have used opioids for a long time may have a lower quality of life, worse depression, higher levels of pain and more visits to doctors.    Never share your opioids with others. Be sure to store opioids in a secure place, locked if possible.Young children can easily swallow them and overdose.     You can overdose on opioids.  Signs of overdose include decrease or loss of consciousness, slowed breathing, trouble waking and blue lips.  If someone is worried about overdose, they should call 911.    If you are at risk for overdose, you may get naloxone (Narcan, a medicine that reverses the effects of opioids.  If you overdose, a friend or family member can give you Narcan while waiting for the ambulance.  They need to know the signs of overdose and how to give Narcan.    While you're taking opioids:    Don't use alcohol or street drugs. Taking them together can cause death.    Don't take any of these  medicines unless your doctor says its okay.  Taking these with opioids can cause death.    Benzodiazepines (such as lorazepam         or diazepam)    Muscle relaxers (such as cyclobenzaprine)    sleeping pills    other opioids    Safe disposal of opioids  Find your area drug take-back program, your pharmacy mail-back program, buy a special disposal bag (such as Deterra) from your pharmacy or flush them down the toilet.  Use the guidelines at:  www.fda.gov/drugs/resourcesforyou

## 2021-06-21 NOTE — LETTER
Letter by Demetris Osman MD at      Author: Demetris Osman MD Service: -- Author Type: --    Filed:  Encounter Date: 3/25/2021 Status: (Other)         March 25, 2021     Patient: Omero Mota   YOB: 1980   Date of Visit: 3/25/2021       To Whom it May Concern:    Omero Mota was seen in my clinic on 3/25/2021.    If you have any questions or concerns, please don't hesitate to call.    Sincerely,         Electronically signed by Demetris Osman MD

## 2021-06-24 NOTE — TELEPHONE ENCOUNTER
RN cannot approve Refill Request    RN can NOT refill this medication med is not covered by policy/route to provider. Last office visit: 5/18/2018 Demetris Osman MD Last Physical: 12/1/2015 Last MTM visit: Visit date not found Last visit same specialty: 5/18/2018 Demetris Osman MD.  Next visit within 3 mo: Visit date not found  Next physical within 3 mo: Visit date not found      Zohra Uribe, [facility] Triage/Med Refill 2/23/2019    Requested Prescriptions   Pending Prescriptions Disp Refills     ergocalciferol (ERGOCALCIFEROL) 50,000 unit capsule [Pharmacy Med Name: VITAMIN D2 1.25MG(50,000 UNIT)] 12 capsule 0     Sig: TAKE 1 CAPSULE BY MOUTH ONCE A WEEK FOR 12 DOSES.    There is no refill protocol information for this order

## 2021-06-25 NOTE — TELEPHONE ENCOUNTER
Cancelled orders in your inbox-set up to authorize, pt has an upcoming appointment, I will reach out to him and ask him to come fasting.    Genevieve Gutierrez, CESIAN

## 2021-06-25 NOTE — TELEPHONE ENCOUNTER
Controlled Substance Refill Request  Medication Name:   Requested Prescriptions     Pending Prescriptions Disp Refills     buprenorphine-naloxone (SUBOXONE) 8-2 mg Film per sublingual film 90 each 0     Sig: Place 1.5 Film under the tongue 2 (two) times a day.     Date Last Fill: 5/13/21  Requested Pharmacy: [facility]  Submit electronically to pharmacy  Controlled Substance Agreement on file:   Encounter-Level CSA Scan Date:    There are no encounter-level csa scan date.        Last office visit:  5/24/21

## 2021-06-29 NOTE — PROGRESS NOTES
"Progress Notes by Deedee Paniagua AuD at 6/17/2020 11:00 AM     Author: Deedee Paniagua AuD Service: -- Author Type: Audiologist    Filed: 6/17/2020 11:34 AM Encounter Date: 6/17/2020 Status: Signed    : Deedee Paniagua AuD (Audiologist)       Hearing evaluation in conjunction with ENT exam (scheduled to see Dr. Shelton 6-19-20)    Summary:  Audiology visit completed. Please see audiogram below or under \"media\" tab for history and results.    Transducer:  Both insert phones and circumaural headphones were used.    Reliability:    Good    Recommendations:  Follow-up with ENT as scheduled 6-19-20; retest hearing annually (to monitor) or per medical management/patient concern.  Wear hearing protection consistently in noise to preserve residual hearing sensitivity.     Alexandra Gonzalez, [facility]  [place] Licensed Audiologist [id number]           "

## 2021-07-03 NOTE — ADDENDUM NOTE
Addendum Note by Demetris Sotelo MD at 5/21/2018  2:45 PM     Author: Demetris Sotelo MD Service: -- Author Type: Physician    Filed: 5/21/2018  2:45 PM Encounter Date: 5/18/2018 Status: Signed    : Demetris Sotelo MD (Physician)    Addended by: DEMETRIS SOTELO on: 5/21/2018 02:45 PM        Modules accepted: Orders

## 2021-07-03 NOTE — ADDENDUM NOTE
Addendum Note by Griffin Ordaz DO at 8/21/2019  9:49 AM     Author: Griffin Ordaz DO Service: -- Author Type: Physician    Filed: 8/21/2019  9:49 AM Encounter Date: 8/13/2019 Status: Signed    : Griffin Ordaz DO (Physician)    Addended by: GRIFFIN ORDAZ on: 8/21/2019 09:49 AM        Modules accepted: Orders

## 2021-07-03 NOTE — ADDENDUM NOTE
Addendum Note by Griffin Ordaz DO at 8/21/2019  4:32 PM     Author: Griffin Ordaz DO Service: -- Author Type: Physician    Filed: 8/21/2019  4:32 PM Encounter Date: 8/13/2019 Status: Signed    : Griffin Ordaz DO (Physician)    Addended by: GRIFFIN ORDAZ on: 8/21/2019 04:32 PM        Modules accepted: Orders

## 2021-07-03 NOTE — ADDENDUM NOTE
Addendum Note by Griffin Ordaz DO at 8/19/2019  1:07 PM     Author: Griffin Ordaz DO Service: -- Author Type: Physician    Filed: 8/19/2019  1:07 PM Encounter Date: 8/13/2019 Status: Signed    : Griffin Ordaz DO (Physician)    Addended by: GRIFFIN ORDAZ on: 8/19/2019 01:07 PM        Modules accepted: Orders

## 2021-07-03 NOTE — ADDENDUM NOTE
Addendum Note by Sam Barclay at 10/7/2019  8:40 AM     Author: Sam Barclay Service: -- Author Type:     Filed: 10/7/2019  4:54 PM Encounter Date: 10/7/2019 Status: Signed    : Sam Barclay ()    Addended by: SAM BARCLAY on: 10/7/2019 04:54 PM        Modules accepted: Orders

## 2021-07-07 ENCOUNTER — COMMUNICATION - HEALTHEAST (OUTPATIENT)
Dept: FAMILY MEDICINE | Facility: CLINIC | Age: 41
End: 2021-07-07

## 2021-07-07 DIAGNOSIS — E66.01 MORBID OBESITY (H): ICD-10-CM

## 2021-07-07 DIAGNOSIS — E88.810 METABOLIC SYNDROME X: ICD-10-CM

## 2021-07-07 DIAGNOSIS — E11.65 TYPE 2 DIABETES MELLITUS WITH HYPERGLYCEMIA, WITHOUT LONG-TERM CURRENT USE OF INSULIN (H): ICD-10-CM

## 2021-07-07 NOTE — TELEPHONE ENCOUNTER
Telephone Encounter by Costa Hyman, RN at 7/7/2021  8:22 AM     Author: Costa Hyman RN Service: -- Author Type: Registered Nurse    Filed: 7/7/2021  8:23 AM Encounter Date: 7/7/2021 Status: Signed    : Costa Hyman, RN (Registered Nurse)       Refill Approved    Rx renewed per Medication Renewal Policy. Medication was last renewed on 1/7/21.    Costa Hyman, [facility] Triage/Med Refill 7/7/2021     Requested Prescriptions   Pending Prescriptions Disp Refills   ? pravastatin (PRAVACHOL) 20 MG tablet [Pharmacy Med Name: PRAVASTATIN SODIUM 20 MG TAB] 30 tablet 5     Sig: TAKE 1 TABLET BY MOUTH EVERY DAY IN THE EVENING       Statins Refill Protocol (Hmg CoA Reductase Inhibitors) Passed - 7/7/2021 12:23 AM        Passed - PCP or prescribing provider visit in past 12 months      Last office visit with prescriber/PCP: 3/25/2021 Demetris Osman MD OR same dept: 3/25/2021 Demetris Osman MD OR same specialty: 3/25/2021 Demetris Osman MD  Last physical: Visit date not found Last MTM visit: Visit date not found   Next visit within 3 mo: Visit date not found  Next physical within 3 mo: Visit date not found  Prescriber OR PCP: Demetris Osman MD  Last diagnosis associated with med order: 1. Type 2 diabetes mellitus with hyperglycemia, without long-term current use of insulin (H)  - pravastatin (PRAVACHOL) 20 MG tablet [Pharmacy Med Name: PRAVASTATIN SODIUM 20 MG TAB]; TAKE 1 TABLET BY MOUTH EVERY DAY IN THE EVENING  Dispense: 30 tablet; Refill: 5    2. Morbid obesity (H)  - pravastatin (PRAVACHOL) 20 MG tablet [Pharmacy Med Name: PRAVASTATIN SODIUM 20 MG TAB]; TAKE 1 TABLET BY MOUTH EVERY DAY IN THE EVENING  Dispense: 30 tablet; Refill: 5    3. Metabolic syndrome X  - pravastatin (PRAVACHOL) 20 MG tablet [Pharmacy Med Name: PRAVASTATIN SODIUM 20 MG TAB]; TAKE 1 TABLET BY MOUTH EVERY DAY IN THE EVENING  Dispense: 30 tablet; Refill: 5    If protocol passes may refill for 12 months if within 3 months of  last provider visit (or a total of 15 months).

## 2021-07-19 DIAGNOSIS — R73.03 PREDIABETES: ICD-10-CM

## 2021-07-23 RX ORDER — METFORMIN HCL 500 MG
TABLET, EXTENDED RELEASE 24 HR ORAL
Qty: 60 TABLET | Refills: 5 | Status: SHIPPED | OUTPATIENT
Start: 2021-07-23 | End: 2021-10-04

## 2021-07-23 NOTE — TELEPHONE ENCOUNTER
"Routing refill request to provider for review/approval because:  Labs out of range:  A1C of 8.1, protocol fails routing to PCP to advise on.     Last Written Prescription Date:  1/7/21  Last Fill Quantity: 180,  # refills: 1   Last office visit provider:  7/8/21    Requested Prescriptions   Pending Prescriptions Disp Refills     metFORMIN (GLUCOPHAGE-XR) 500 MG 24 hr tablet [Pharmacy Med Name: METFORMIN HCL  MG TABLET] 60 tablet 5     Sig: TAKE 2 TABLETS BY MOUTH EVERY DAY WITH BREAKFAST       Biguanide Agents Failed - 7/19/2021 10:15 AM        Failed - Patient has documented A1c within the specified period of time.     If HgbA1C is 8 or greater, it needs to be on file within the past 3 months.  If less than 8, must be on file within the past 6 months.     Recent Labs   Lab Test 01/07/21  1055   A1C 8.1*             Failed - Medication is active on med list        Failed - Recent (6 mo) or future (30 days) visit within the authorizing provider's specialty     Patient had office visit in the last 6 months or has a visit in the next 30 days with authorizing provider or within the authorizing provider's specialty.  See \"Patient Info\" tab in inbasket, or \"Choose Columns\" in Meds & Orders section of the refill encounter.            Passed - Patient is age 10 or older        Passed - Patient's CR is NOT>1.4 OR Patient's EGFR is NOT<45 within past 12 mos.     Recent Labs   Lab Test 01/07/21  1546   GFRESTIMATED >60   GFRESTBLACK >60       Recent Labs   Lab Test 01/07/21  1546   CR 0.89             Passed - Patient does NOT have a diagnosis of CHF.             Paradise Greco RN 07/23/21 3:37 PM  "

## 2021-07-24 ENCOUNTER — HEALTH MAINTENANCE LETTER (OUTPATIENT)
Age: 41
End: 2021-07-24

## 2021-08-10 ENCOUNTER — MYC MEDICAL ADVICE (OUTPATIENT)
Dept: FAMILY MEDICINE | Facility: CLINIC | Age: 41
End: 2021-08-10

## 2021-08-10 DIAGNOSIS — F19.11 HISTORY OF DRUG ABUSE IN REMISSION (H): ICD-10-CM

## 2021-08-10 DIAGNOSIS — F19.20 DRUG DEPENDENCE ON MAINTENANCE AGONIST THERAPY, NO SYMPTOMS (H): Primary | ICD-10-CM

## 2021-08-10 RX ORDER — BUPRENORPHINE AND NALOXONE 8; 2 MG/1; MG/1
1.5 FILM, SOLUBLE BUCCAL; SUBLINGUAL
COMMUNITY
Start: 2021-06-11 | End: 2021-08-10

## 2021-08-10 RX ORDER — METFORMIN HCL 500 MG
1000 TABLET, EXTENDED RELEASE 24 HR ORAL
COMMUNITY
Start: 2021-01-07 | End: 2021-08-11

## 2021-08-10 RX ORDER — LISINOPRIL 20 MG/1
TABLET ORAL
COMMUNITY
Start: 2020-12-22 | End: 2021-11-15

## 2021-08-10 RX ORDER — PRAVASTATIN SODIUM 20 MG
20 TABLET ORAL
COMMUNITY
Start: 2021-01-07 | End: 2021-08-18

## 2021-08-11 RX ORDER — BUPRENORPHINE AND NALOXONE 8; 2 MG/1; MG/1
FILM, SOLUBLE BUCCAL; SUBLINGUAL
Qty: 90 EACH | Refills: 0 | Status: SHIPPED | OUTPATIENT
Start: 2021-08-11 | End: 2021-09-08

## 2021-08-18 ENCOUNTER — MYC MEDICAL ADVICE (OUTPATIENT)
Dept: FAMILY MEDICINE | Facility: CLINIC | Age: 41
End: 2021-08-18

## 2021-08-18 DIAGNOSIS — L74.513 HYPERHIDROSIS OF PALMS AND SOLES: ICD-10-CM

## 2021-08-18 DIAGNOSIS — E78.00 HYPERCHOLESTEROLEMIA: Primary | ICD-10-CM

## 2021-08-18 DIAGNOSIS — L74.512 HYPERHIDROSIS OF PALMS AND SOLES: ICD-10-CM

## 2021-08-18 DIAGNOSIS — E11.65 TYPE 2 DIABETES MELLITUS WITH HYPERGLYCEMIA, WITHOUT LONG-TERM CURRENT USE OF INSULIN (H): ICD-10-CM

## 2021-08-18 RX ORDER — PRAVASTATIN SODIUM 20 MG
20 TABLET ORAL DAILY
Qty: 90 TABLET | Refills: 1 | Status: SHIPPED | OUTPATIENT
Start: 2021-08-18 | End: 2022-07-27

## 2021-08-19 PROBLEM — L74.513 HYPERHIDROSIS OF PALMS AND SOLES: Status: ACTIVE | Noted: 2021-08-19

## 2021-08-19 PROBLEM — L74.512 HYPERHIDROSIS OF PALMS AND SOLES: Status: ACTIVE | Noted: 2021-08-19

## 2021-09-18 ENCOUNTER — HEALTH MAINTENANCE LETTER (OUTPATIENT)
Age: 41
End: 2021-09-18

## 2021-10-04 ENCOUNTER — OFFICE VISIT (OUTPATIENT)
Dept: FAMILY MEDICINE | Facility: CLINIC | Age: 41
End: 2021-10-04
Payer: COMMERCIAL

## 2021-10-04 VITALS
TEMPERATURE: 98.1 F | HEART RATE: 76 BPM | RESPIRATION RATE: 18 BRPM | DIASTOLIC BLOOD PRESSURE: 80 MMHG | WEIGHT: 228 LBS | BODY MASS INDEX: 36.64 KG/M2 | SYSTOLIC BLOOD PRESSURE: 130 MMHG | OXYGEN SATURATION: 99 % | HEIGHT: 66 IN

## 2021-10-04 DIAGNOSIS — E66.01 MORBID OBESITY (H): ICD-10-CM

## 2021-10-04 DIAGNOSIS — I10 ESSENTIAL HYPERTENSION: ICD-10-CM

## 2021-10-04 DIAGNOSIS — F19.11 HISTORY OF DRUG ABUSE IN REMISSION (H): ICD-10-CM

## 2021-10-04 DIAGNOSIS — E11.65 TYPE 2 DIABETES MELLITUS WITH HYPERGLYCEMIA, WITHOUT LONG-TERM CURRENT USE OF INSULIN (H): Primary | ICD-10-CM

## 2021-10-04 DIAGNOSIS — F19.20 DRUG DEPENDENCE ON MAINTENANCE AGONIST THERAPY, NO SYMPTOMS (H): ICD-10-CM

## 2021-10-04 LAB
ANION GAP SERPL CALCULATED.3IONS-SCNC: 10 MMOL/L (ref 5–18)
BUN SERPL-MCNC: 9 MG/DL (ref 8–22)
CALCIUM SERPL-MCNC: 9.5 MG/DL (ref 8.5–10.5)
CHLORIDE BLD-SCNC: 104 MMOL/L (ref 98–107)
CO2 SERPL-SCNC: 23 MMOL/L (ref 22–31)
CREAT SERPL-MCNC: 0.87 MG/DL (ref 0.7–1.3)
GFR SERPL CREATININE-BSD FRML MDRD: >90 ML/MIN/1.73M2
GLUCOSE BLD-MCNC: 291 MG/DL (ref 70–125)
HBA1C MFR BLD: 8.6 % (ref 0–5.6)
HOLD SPECIMEN: NORMAL
POTASSIUM BLD-SCNC: 4.2 MMOL/L (ref 3.5–5)
SODIUM SERPL-SCNC: 137 MMOL/L (ref 136–145)

## 2021-10-04 PROCEDURE — 80048 BASIC METABOLIC PNL TOTAL CA: CPT | Performed by: FAMILY MEDICINE

## 2021-10-04 PROCEDURE — 36415 COLL VENOUS BLD VENIPUNCTURE: CPT | Performed by: FAMILY MEDICINE

## 2021-10-04 PROCEDURE — 83036 HEMOGLOBIN GLYCOSYLATED A1C: CPT | Performed by: FAMILY MEDICINE

## 2021-10-04 PROCEDURE — 99214 OFFICE O/P EST MOD 30 MIN: CPT | Performed by: FAMILY MEDICINE

## 2021-10-04 RX ORDER — SEMAGLUTIDE 0.25 MG/.5ML
0.25 INJECTION, SOLUTION SUBCUTANEOUS WEEKLY
Qty: 2 ML | Refills: 0 | Status: SHIPPED | OUTPATIENT
Start: 2021-10-04 | End: 2022-01-07

## 2021-10-04 RX ORDER — METFORMIN HCL 500 MG
1000 TABLET, EXTENDED RELEASE 24 HR ORAL
Qty: 180 TABLET | Refills: 3 | Status: SHIPPED | OUTPATIENT
Start: 2021-10-04 | End: 2022-10-18

## 2021-10-04 ASSESSMENT — MIFFLIN-ST. JEOR: SCORE: 1871.95

## 2021-10-04 NOTE — PROGRESS NOTES
"Assessment/Plan:    Type 2 diabetes mellitus with hyperglycemia, without long-term current use of insulin (H)  Diabetic control remains poor.  Ad nicole. eating.  He states that he is a plan to improve his nutrition using an ashely and nutritional restriction of carbohydrates as well as time restricted eating.  We do lengthy conversation regarding long-term consequences of hyperglycemia.  He is interested in improving his nutrition.  -Continue Metformin.  -Continue pravastatin/aspirin.  -Ongoing tobacco exposure through chewing.  -Add semaglutide (Wegovy).  Unclear if he will get insurance coverage.  If not covered for medical weight loss, we would try semaglutide (Ozempic) for diabetes.  If not covered, we would simply utilize \"food as medicine.\"  -Follow-up 3-4 weeks after starting new medicine.  Video format would be appropriate.    Drug dependence on maintenance agonist therapy, no symptoms (H)  41 year old year old male in clinic today to discuss ongoing opioid abstinence maintenance therapy.  Overall, this therapy plan has been successful based on ongoing sobriety.    -PDMP reviewed.  No evidence of misuse or diversion.  -Drugs of abuse screen: Consistently positive for buprenorphine and negative for other controlled substances.  -Chemical dependency therapy: Support through his nubia/[facility]  -Other comorbidities: Abstinent from alcohol  -Continue buprenorphine/naloxone current dosing.  -Follow-up: 8 weeks      36 minutes spent on the date of the encounter doing chart review, history and exam, documentation and further activities per the note    Return in about 8 weeks (around 11/29/2021) for Follow up, (video visit).    Demetris Osman MD  _______________________________    Chief Complaint   Patient presents with     Clinic Care Coordination - Follow-up     diabetes and medication      Subjective: Omero Mota is a 41 year old year old male who returns to clinic for the following chronic " "complaints/concerns:     Diabetes:   - \"its going to be high.\"  He is going to try to fast to improve his nutrition. Planning to track calories.      -he says that he otherwise feels good.    Med check - Suboxone:  -maintenance of sobriety: no use since last visit.  Other substances: NO  -effect of medication/cravings?  no craving. Adequate dosing: adequate  -side effects: none  -family support: helpful  -non-suboxone substance abuse therapies: [facility] meetings  -other: none    Review of Systems   Constitutional:        Review of systems negative except as noted in the HPI.   All other systems reviewed and are negative.       Reviewed history:    Problems             Objective:    height is 1.676 m (5' 6\") and weight is 103.4 kg (228 lb). His oral temperature is 98.1  F (36.7  C). His blood pressure is 130/80 and his pulse is 76. His respiration is 18 and oxygen saturation is 99%.   Physical Exam  Nursing note reviewed.   Constitutional:       General: He is not in acute distress.     Appearance: Normal appearance. He is not ill-appearing.   HENT:      Head: Normocephalic and atraumatic.   Eyes:      Extraocular Movements: Extraocular movements intact.      Conjunctiva/sclera: Conjunctivae normal.   Pulmonary:      Effort: Pulmonary effort is normal.   Neurological:      Mental Status: He is alert and oriented to person, place, and time.   Psychiatric:         Attention and Perception: Attention normal.         Mood and Affect: Mood normal.         Speech: Speech normal.         Thought Content: Thought content normal.       No flowsheet data found.  No flowsheet data found.  No flowsheet data found.  No flowsheet data found.  Recent Results (from the past 48 hour(s))   Hemoglobin A1c    Collection Time: 10/04/21 12:29 PM   Result Value Ref Range    Hemoglobin A1C 8.6 (H) 0.0 - 5.6 %     No results found for this visit on 10/04/21.    This note has been dictated using voice recognition software. Any grammatical " or context distortions are unintentional and inherent to the software

## 2021-10-04 NOTE — ASSESSMENT & PLAN NOTE
"Diabetic control remains poor.  Ad nicole. eating.  He states that he is a plan to improve his nutrition using an ashely and nutritional restriction of carbohydrates as well as time restricted eating.  We do lengthy conversation regarding long-term consequences of hyperglycemia.  He is interested in improving his nutrition.  -Continue Metformin.  -Continue pravastatin/aspirin.  -Ongoing tobacco exposure through chewing.  -Add semaglutide (Wegovy).  Unclear if he will get insurance coverage.  If not covered for medical weight loss, we would try semaglutide (Ozempic) for diabetes.  If not covered, we would simply utilize \"food as medicine.\"  -Follow-up 3-4 weeks after starting new medicine.  Video format would be appropriate.  "

## 2021-10-04 NOTE — ASSESSMENT & PLAN NOTE
41 year old year old male in clinic today to discuss ongoing opioid abstinence maintenance therapy.  Overall, this therapy plan has been successful based on ongoing sobriety.    -PDMP reviewed.  No evidence of misuse or diversion.  -Drugs of abuse screen: Consistently positive for buprenorphine and negative for other controlled substances.  -Chemical dependency therapy: Support through his nubia/[facility]  -Other comorbidities: Abstinent from alcohol  -Continue buprenorphine/naloxone current dosing.  -Follow-up: 8 weeks

## 2021-10-07 RX ORDER — BUPRENORPHINE AND NALOXONE 8; 2 MG/1; MG/1
FILM, SOLUBLE BUCCAL; SUBLINGUAL
Qty: 90 EACH | Refills: 0 | Status: SHIPPED | OUTPATIENT
Start: 2021-10-07 | End: 2021-11-04

## 2021-10-27 ENCOUNTER — MYC REFILL (OUTPATIENT)
Dept: FAMILY MEDICINE | Facility: CLINIC | Age: 41
End: 2021-10-27

## 2021-10-27 DIAGNOSIS — E11.65 TYPE 2 DIABETES MELLITUS WITH HYPERGLYCEMIA, WITHOUT LONG-TERM CURRENT USE OF INSULIN (H): ICD-10-CM

## 2021-10-27 DIAGNOSIS — E66.01 MORBID OBESITY (H): ICD-10-CM

## 2021-10-28 NOTE — TELEPHONE ENCOUNTER
Routing refill request to provider for review/approval because:  Drug not on the [facility] refill protocol     Last Written Prescription Date:  10/4/21  Last Fill Quantity: 2 ml,  # refills: 0   Last office visit provider:  10/4/21     Requested Prescriptions   Pending Prescriptions Disp Refills     Semaglutide-Weight Management (WEGOVY) 0.25 MG/0.5ML SOAJ 2 mL 0     Sig: Inject 0.25 mg Subcutaneous once a week       There is no refill protocol information for this order          Costa Hyman RN 10/28/21 3:11 PM

## 2021-10-29 RX ORDER — SEMAGLUTIDE 0.25 MG/.5ML
0.25 INJECTION, SOLUTION SUBCUTANEOUS WEEKLY
Qty: 2 ML | Refills: 0 | Status: CANCELLED | OUTPATIENT
Start: 2021-10-29

## 2021-11-04 ENCOUNTER — MYC REFILL (OUTPATIENT)
Dept: FAMILY MEDICINE | Facility: CLINIC | Age: 41
End: 2021-11-04

## 2021-11-04 DIAGNOSIS — F19.11 HISTORY OF DRUG ABUSE IN REMISSION (H): ICD-10-CM

## 2021-11-04 DIAGNOSIS — F19.20 DRUG DEPENDENCE ON MAINTENANCE AGONIST THERAPY, NO SYMPTOMS (H): ICD-10-CM

## 2021-11-05 RX ORDER — BUPRENORPHINE AND NALOXONE 8; 2 MG/1; MG/1
FILM, SOLUBLE BUCCAL; SUBLINGUAL
Qty: 90 EACH | Refills: 0 | Status: SHIPPED | OUTPATIENT
Start: 2021-11-05 | End: 2021-12-03

## 2021-11-26 DIAGNOSIS — E11.65 TYPE 2 DIABETES MELLITUS WITH HYPERGLYCEMIA, WITHOUT LONG-TERM CURRENT USE OF INSULIN (H): ICD-10-CM

## 2021-11-26 DIAGNOSIS — E66.01 MORBID OBESITY (H): ICD-10-CM

## 2021-11-28 NOTE — TELEPHONE ENCOUNTER
Routing refill request to provider for review/approval because:  Drug not on the [facility] refill protocol     Last Written Prescription Date:  10/29/2021  Last Fill Quantity: 2mL,  # refills: 0   Last office visit provider:  10/4/2021     Requested Prescriptions   Pending Prescriptions Disp Refills     WEGOVY 0.5 MG/0.5ML SOAJ [Pharmacy Med Name: WEGOVY 0.5 MG/0.5 ML PEN]       Sig: INJECT 0.5 MG SUBCUTANEOUS ONCE A WEEK       There is no refill protocol information for this order          Maya Holder RN 11/27/21 6:14 PM

## 2021-11-29 RX ORDER — SEMAGLUTIDE 0.5 MG/.5ML
INJECTION, SOLUTION SUBCUTANEOUS
OUTPATIENT
Start: 2021-11-29

## 2021-11-29 RX ORDER — SEMAGLUTIDE 1 MG/.5ML
1 INJECTION, SOLUTION SUBCUTANEOUS WEEKLY
Qty: 2 ML | Refills: 0 | Status: SHIPPED | OUTPATIENT
Start: 2021-11-29 | End: 2022-01-07

## 2021-12-03 ENCOUNTER — MYC REFILL (OUTPATIENT)
Dept: FAMILY MEDICINE | Facility: CLINIC | Age: 41
End: 2021-12-03
Payer: COMMERCIAL

## 2021-12-03 DIAGNOSIS — F19.11 HISTORY OF DRUG ABUSE IN REMISSION (H): ICD-10-CM

## 2021-12-03 DIAGNOSIS — F19.20 DRUG DEPENDENCE ON MAINTENANCE AGONIST THERAPY, NO SYMPTOMS (H): ICD-10-CM

## 2021-12-05 NOTE — TELEPHONE ENCOUNTER
Routing refill request to provider for review/approval because:  Controlled substance request    Last Written Prescription Date:  11/5/21  Last Fill Quantity: 90,  # refills: 0   Last office visit provider:  10/4/21     Requested Prescriptions   Pending Prescriptions Disp Refills     buprenorphine HCl-naloxone HCl (SUBOXONE) 8-2 MG per film 90 each 0     Sig: Place 1.5 film under tongue 2 (two) times per day.       There is no refill protocol information for this order          Costa Hyman RN 12/05/21 2:09 PM

## 2021-12-06 RX ORDER — BUPRENORPHINE AND NALOXONE 8; 2 MG/1; MG/1
FILM, SOLUBLE BUCCAL; SUBLINGUAL
Qty: 90 EACH | Refills: 0 | Status: SHIPPED | OUTPATIENT
Start: 2021-12-06 | End: 2022-01-07

## 2022-01-07 ENCOUNTER — OFFICE VISIT (OUTPATIENT)
Dept: FAMILY MEDICINE | Facility: CLINIC | Age: 42
End: 2022-01-07
Payer: COMMERCIAL

## 2022-01-07 VITALS
SYSTOLIC BLOOD PRESSURE: 126 MMHG | DIASTOLIC BLOOD PRESSURE: 70 MMHG | OXYGEN SATURATION: 97 % | RESPIRATION RATE: 16 BRPM | TEMPERATURE: 98 F | HEIGHT: 66 IN | HEART RATE: 84 BPM | BODY MASS INDEX: 33.27 KG/M2 | WEIGHT: 207 LBS

## 2022-01-07 DIAGNOSIS — F19.11 HISTORY OF DRUG ABUSE IN REMISSION (H): ICD-10-CM

## 2022-01-07 DIAGNOSIS — F10.21 ALCOHOL DEPENDENCE IN REMISSION (H): ICD-10-CM

## 2022-01-07 DIAGNOSIS — E11.65 TYPE 2 DIABETES MELLITUS WITH HYPERGLYCEMIA, WITHOUT LONG-TERM CURRENT USE OF INSULIN (H): Primary | ICD-10-CM

## 2022-01-07 DIAGNOSIS — F19.20 DRUG DEPENDENCE ON MAINTENANCE AGONIST THERAPY, NO SYMPTOMS (H): ICD-10-CM

## 2022-01-07 DIAGNOSIS — I10 ESSENTIAL HYPERTENSION: ICD-10-CM

## 2022-01-07 PROBLEM — F10.20 ALCOHOL DEPENDENCE (H): Status: ACTIVE | Noted: 2022-01-07

## 2022-01-07 PROBLEM — F10.20 ALCOHOL DEPENDENCE (H): Status: RESOLVED | Noted: 2022-01-07 | Resolved: 2022-01-07

## 2022-01-07 LAB
HBA1C MFR BLD: 6.5 % (ref 0–5.6)
HOLD SPECIMEN: NORMAL

## 2022-01-07 PROCEDURE — 83036 HEMOGLOBIN GLYCOSYLATED A1C: CPT | Performed by: FAMILY MEDICINE

## 2022-01-07 PROCEDURE — 99214 OFFICE O/P EST MOD 30 MIN: CPT | Performed by: FAMILY MEDICINE

## 2022-01-07 PROCEDURE — 36415 COLL VENOUS BLD VENIPUNCTURE: CPT | Performed by: FAMILY MEDICINE

## 2022-01-07 RX ORDER — BUPRENORPHINE AND NALOXONE 8; 2 MG/1; MG/1
FILM, SOLUBLE BUCCAL; SUBLINGUAL
Qty: 90 EACH | Refills: 0 | Status: SHIPPED | OUTPATIENT
Start: 2022-01-07 | End: 2022-02-03

## 2022-01-07 ASSESSMENT — MIFFLIN-ST. JEOR: SCORE: 1776.7

## 2022-01-07 NOTE — ASSESSMENT & PLAN NOTE
Diabetes currently well controlled.  Hemoglobin A1c 6.5% today.  Patient describes intolerable constipation since starting semaglutide.  When he got sick with COVID-19 he discontinued.  He lost a bunch of weight while he was sick with COVID-19 which is probably the main reason he has had improvement of his glycemic control.  He expresses a desire to continue home-cooked meals and healthy foods.  His children are interested in a healthier diet as well.  For now, will continue pravastatin, aspirin, metformin.  He is on an ACE inhibitor.  If he is struggling in 2 months when I see him for his Suboxone follow-up will consider restarting semaglutide (Ozempic?  Supply of Wegovy?).

## 2022-01-07 NOTE — PROGRESS NOTES
Assessment/Plan:    Alcohol dependence in remission (H)  Ongoing sobriety.  No recent use.     Drug dependence on maintenance agonist therapy, no symptoms (H)  Doing well with suboxone.  No evidence of misuse or diversion.  No change to plan recommended.     Type 2 diabetes mellitus with hyperglycemia, without long-term current use of insulin (H)  Diabetes currently well controlled.  Hemoglobin A1c 6.5% today.  Patient describes intolerable constipation since starting semaglutide.  When he got sick with COVID-19 he discontinued.  He lost a bunch of weight while he was sick with COVID-19 which is probably the main reason he has had improvement of his glycemic control.  He expresses a desire to continue home-cooked meals and healthy foods.  His children are interested in a healthier diet as well.  For now, will continue pravastatin, aspirin, metformin.  He is on an ACE inhibitor.  If he is struggling in 2 months when I see him for his Suboxone follow-up will consider restarting semaglutide (Ozempic?  Supply of Wegovy?).    Return in about 2 months (around 3/7/2022) for Med Check for suboxone, (video visit).    Demetris Osman MD  _______________________________    Chief Complaint   Patient presents with     Clinic Care Coordination - Follow-up     medication and diabetes ( pt is not fasting)      Subjective: Omero Mota is a 41 year old year old male who returns to clinic for the following chronic complaints/concerns:     Suboxone:   - no concerns.  Doing well.  Constipation stable.  Less suboxone use during recent COVID-19 infection     Weight / DM:   - lost weight with COVID-19.  Now recovered.  He had been on wegovy because of COVID. He had to use a suppository. He received monoclonal antibodies.   Appetite has started to return to normal. Sleep is messed up since having COVID.  He had been in a fasting regimen prior to getting COVID-19. LISSETTE regimen.      Review of Systems   Constitutional:        Review  "of systems negative except as noted in the HPI.   All other systems reviewed and are negative.     Reviewed history: Tobacco     Med Hx  Surg Hx   Soc Hx     Objective:    height is 1.676 m (5' 6\") and weight is 93.9 kg (207 lb). His oral temperature is 98  F (36.7  C). His blood pressure is 126/70 and his pulse is 84. His respiration is 16 and oxygen saturation is 97%.   Physical Exam  Nursing note reviewed.   Constitutional:       General: He is not in acute distress.     Appearance: Normal appearance. He is not ill-appearing.   HENT:      Head: Normocephalic and atraumatic.   Eyes:      Extraocular Movements: Extraocular movements intact.      Conjunctiva/sclera: Conjunctivae normal.   Pulmonary:      Effort: Pulmonary effort is normal.   Neurological:      Mental Status: He is alert and oriented to person, place, and time.   Psychiatric:         Attention and Perception: Attention normal.         Mood and Affect: Mood normal.         Speech: Speech normal.         Thought Content: Thought content normal.       No flowsheet data found.  No flowsheet data found.  No flowsheet data found.  No flowsheet data found.  Recent Results (from the past 48 hour(s))   Hemoglobin A1c    Collection Time: 01/07/22 11:07 AM   Result Value Ref Range    Hemoglobin A1C 6.5 (H) 0.0 - 5.6 %     No results found for this visit on 01/07/22.    This note has been dictated using voice recognition software. Any grammatical or context distortions are unintentional and inherent to the software    "

## 2022-01-25 NOTE — PROGRESS NOTES
CHIEF COMPLAINT:        HISTORY OF PRESENT ILLNESS    Omero was seen at the behest of Demetris Patino MD for ETD and chronic TARI.    Patient states that he has had chronic ear issues.  He has been seen by Joseph Rain in the past for balloon eustachian dilation of the eustachian tube which was not successful.  He has had multiple sets of tubes.  He comes in because the hearing is down on both sides and has had some drainage from the left ear.        Last ENT visit 1 year    RECOMMENDATION  I discussed replacement of the PE tubes. He has had many sets and wants them back in. He was taken to the treatment room. The right ear was examined under the microscope revealing significant retraction. A small amount of phenol was applied to the posteroinferior quadrant. A myringotomy was made. There was scant fluid. A Triune tube was placed. A similar procedure was performed on the opposite side. He had subjective improvement in his hearing. Follow up as needed.     Amador Shelton MD        REVIEW OF SYSTEMS    Review of Systems as per HPI and PMHx, otherwise 10 system review system are negative.     Patient has no known allergies.     There were no vitals taken for this visit.    HEAD: Normal appearance and symmetry:  No cutaneous lesions.      NECK:  supple     EARS:     Examination of the right ear shows a retracted tympanic membrane that does not move with Valsalva.  On the left-hand side there is a thickened tympanic membrane with a small anterior inferior perforation with mucoid exudate behind it.    Bilateral tube placement    After obtaining written consent patient taken my procedure room.  Under the micro microscope on the left-hand side the small perforation is enlarged slightly and a Mary type T-tube is placed without difficulty.  On the on the right side the same procedure is performed except a myringotomy is performed posterior superior aspect after placing a drop of phenol and a thick mucoid effusion is removed  and a Mary type tube was placed.     NOSE:     Dorsum:   straight          ORAL CAVITY/OROPHARYNX:     Lips:  Normal.       NECK:  Trachea:  midline.                   NEURO:   Alert and Oriented     GAIT AND STATION:  normal     RESPIRATORY:   Symmetry and Respiratory effort     PSYCH:  Normal mood and affect     SKIN:   warm and dry         IMPRESSION:    Encounter Diagnoses   Name Primary?     Dysfunction of Eustachian tube, unspecified laterality Yes     Chronic TARI (middle ear effusion), bilateral      S/p bilateral myringotomy with tube placement           RECOMMENDATIONS:    Patient status post patient with chronic eustachian tube dysfunction.  Wondering if he had may have some underlying allergy or sinus disease.  We will address this further at his next visit.  He will be sent home on Ciprodex drops to be used twice daily in both ears for the next 7 days.  Return in 1 month with an audiogram.

## 2022-01-26 ENCOUNTER — OFFICE VISIT (OUTPATIENT)
Dept: OTOLARYNGOLOGY | Facility: CLINIC | Age: 42
End: 2022-01-26
Payer: COMMERCIAL

## 2022-01-26 DIAGNOSIS — H65.493 CHRONIC MEE (MIDDLE EAR EFFUSION), BILATERAL: ICD-10-CM

## 2022-01-26 DIAGNOSIS — H69.90 DYSFUNCTION OF EUSTACHIAN TUBE, UNSPECIFIED LATERALITY: Primary | ICD-10-CM

## 2022-01-26 DIAGNOSIS — Z96.22 S/P BILATERAL MYRINGOTOMY WITH TUBE PLACEMENT: ICD-10-CM

## 2022-01-26 PROBLEM — F10.20 ALCOHOL DEPENDENCE (H): Status: ACTIVE | Noted: 2022-01-26

## 2022-01-26 PROCEDURE — 99213 OFFICE O/P EST LOW 20 MIN: CPT | Mod: 25 | Performed by: OTOLARYNGOLOGY

## 2022-01-26 PROCEDURE — 69433 CREATE EARDRUM OPENING: CPT | Mod: 50 | Performed by: OTOLARYNGOLOGY

## 2022-01-26 RX ORDER — CIPROFLOXACIN AND DEXAMETHASONE 3; 1 MG/ML; MG/ML
4 SUSPENSION/ DROPS AURICULAR (OTIC) 2 TIMES DAILY
Qty: 2.8 ML | Refills: 0 | Status: SHIPPED | OUTPATIENT
Start: 2022-01-26 | End: 2022-02-02

## 2022-01-26 NOTE — LETTER
1/26/2022         RE: Omero Mota  [address] [id number]        Dear Colleague,    Thank you for referring your patient, Omero Mota, to the [facility]. Please see a copy of my visit note below.    CHIEF COMPLAINT:        HISTORY OF PRESENT ILLNESS    Omero was seen at the behest of Demetris Patino MD for ETD and chronic TARI.    Patient states that he has had chronic ear issues.  He has been seen by Joseph Rain in the past for balloon eustachian dilation of the eustachian tube which was not successful.  He has had multiple sets of tubes.  He comes in because the hearing is down on both sides and has had some drainage from the left ear.        Last ENT visit 1 year    RECOMMENDATION  I discussed replacement of the PE tubes. He has had many sets and wants them back in. He was taken to the treatment room. The right ear was examined under the microscope revealing significant retraction. A small amount of phenol was applied to the posteroinferior quadrant. A myringotomy was made. There was scant fluid. A Triune tube was placed. A similar procedure was performed on the opposite side. He had subjective improvement in his hearing. Follow up as needed.     Amador Shelton MD        REVIEW OF SYSTEMS    Review of Systems as per HPI and PMHx, otherwise 10 system review system are negative.     Patient has no known allergies.     There were no vitals taken for this visit.    HEAD: Normal appearance and symmetry:  No cutaneous lesions.      NECK:  supple     EARS:     Examination of the right ear shows a retracted tympanic membrane that does not move with Valsalva.  On the left-hand side there is a thickened tympanic membrane with a small anterior inferior perforation with mucoid exudate behind it.    Bilateral tube placement    After obtaining written consent patient taken my procedure room.  Under the micro microscope on the left-hand side the small perforation is  enlarged slightly and a Mary type T-tube is placed without difficulty.  On the on the right side the same procedure is performed except a myringotomy is performed posterior superior aspect after placing a drop of phenol and a thick mucoid effusion is removed and a Mary type tube was placed.     NOSE:     Dorsum:   straight          ORAL CAVITY/OROPHARYNX:     Lips:  Normal.       NECK:  Trachea:  midline.                   NEURO:   Alert and Oriented     GAIT AND STATION:  normal     RESPIRATORY:   Symmetry and Respiratory effort     PSYCH:  Normal mood and affect     SKIN:   warm and dry         IMPRESSION:    Encounter Diagnoses   Name Primary?     Dysfunction of Eustachian tube, unspecified laterality Yes     Chronic TARI (middle ear effusion), bilateral      S/p bilateral myringotomy with tube placement           RECOMMENDATIONS:    Patient status post patient with chronic eustachian tube dysfunction.  Wondering if he had may have some underlying allergy or sinus disease.  We will address this further at his next visit.  He will be sent home on Ciprodex drops to be used twice daily in both ears for the next 7 days.  Return in 1 month with an audiogram.        Again, thank you for allowing me to participate in the care of your patient.        Sincerely,        Anam Hook MD

## 2022-02-03 ENCOUNTER — MYC REFILL (OUTPATIENT)
Dept: FAMILY MEDICINE | Facility: CLINIC | Age: 42
End: 2022-02-03
Payer: COMMERCIAL

## 2022-02-03 DIAGNOSIS — F19.20 DRUG DEPENDENCE ON MAINTENANCE AGONIST THERAPY, NO SYMPTOMS (H): ICD-10-CM

## 2022-02-03 DIAGNOSIS — F19.11 HISTORY OF DRUG ABUSE IN REMISSION (H): ICD-10-CM

## 2022-02-06 NOTE — TELEPHONE ENCOUNTER
Routing refill request to provider for review/approval because:  Controlled substance request    Last Written Prescription Date:  1/7/22  Last Fill Quantity: 90,  # refills: 0   Last office visit provider:  1/7/22     Requested Prescriptions   Pending Prescriptions Disp Refills     buprenorphine HCl-naloxone HCl (SUBOXONE) 8-2 MG per film 90 each 0     Sig: Place 1.5 film under tongue 2 (two) times per day.       There is no refill protocol information for this order          Costa Hyman RN 02/06/22 10:38 AM

## 2022-02-07 RX ORDER — BUPRENORPHINE AND NALOXONE 8; 2 MG/1; MG/1
FILM, SOLUBLE BUCCAL; SUBLINGUAL
Qty: 90 EACH | Refills: 0 | Status: SHIPPED | OUTPATIENT
Start: 2022-02-07 | End: 2022-03-04

## 2022-03-04 ENCOUNTER — MYC REFILL (OUTPATIENT)
Dept: FAMILY MEDICINE | Facility: CLINIC | Age: 42
End: 2022-03-04
Payer: COMMERCIAL

## 2022-03-04 DIAGNOSIS — F19.20 DRUG DEPENDENCE ON MAINTENANCE AGONIST THERAPY, NO SYMPTOMS (H): ICD-10-CM

## 2022-03-04 DIAGNOSIS — F19.11 HISTORY OF DRUG ABUSE IN REMISSION (H): ICD-10-CM

## 2022-03-07 RX ORDER — BUPRENORPHINE AND NALOXONE 8; 2 MG/1; MG/1
FILM, SOLUBLE BUCCAL; SUBLINGUAL
Qty: 90 EACH | Refills: 0 | Status: SHIPPED | OUTPATIENT
Start: 2022-03-07 | End: 2022-04-04

## 2022-03-07 NOTE — TELEPHONE ENCOUNTER
Routing refill request to provider for review/approval because:  Controlled substance request    Last Written Prescription Date:  2/7/22  Last Fill Quantity: 90,  # refills: 0   Last office visit provider:  1/7/22     Requested Prescriptions   Pending Prescriptions Disp Refills     buprenorphine HCl-naloxone HCl (SUBOXONE) 8-2 MG per film 90 each 0     Sig: Place 1.5 film under tongue 2 (two) times per day.       There is no refill protocol information for this order          Costa Hyman RN 03/07/22 8:04 AM

## 2022-04-04 ENCOUNTER — MYC REFILL (OUTPATIENT)
Dept: FAMILY MEDICINE | Facility: CLINIC | Age: 42
End: 2022-04-04
Payer: COMMERCIAL

## 2022-04-04 DIAGNOSIS — F19.20 DRUG DEPENDENCE ON MAINTENANCE AGONIST THERAPY, NO SYMPTOMS (H): ICD-10-CM

## 2022-04-04 DIAGNOSIS — F19.11 HISTORY OF DRUG ABUSE IN REMISSION (H): ICD-10-CM

## 2022-04-04 NOTE — TELEPHONE ENCOUNTER
Routing refill request to provider for review/approval because:  Drug not on [place] [facility] [place] refill    Last Written Prescription Date:  1/7/22  Last Fill Quantity: 90,  # refills: 0   Last office visit provider:  1/7/22     Requested Prescriptions   Pending Prescriptions Disp Refills     buprenorphine HCl-naloxone HCl (SUBOXONE) 8-2 MG per film 90 each 0     Sig: Place 1.5 film under tongue 2 (two) times per day.       There is no refill protocol information for this order          Marcia Stephenson 04/04/22 4:04 PM

## 2022-04-05 RX ORDER — BUPRENORPHINE AND NALOXONE 8; 2 MG/1; MG/1
FILM, SOLUBLE BUCCAL; SUBLINGUAL
Qty: 90 EACH | Refills: 0 | Status: SHIPPED | OUTPATIENT
Start: 2022-04-05 | End: 2022-05-04

## 2022-04-30 ENCOUNTER — HEALTH MAINTENANCE LETTER (OUTPATIENT)
Age: 42
End: 2022-04-30

## 2022-05-01 ENCOUNTER — MYC REFILL (OUTPATIENT)
Dept: FAMILY MEDICINE | Facility: CLINIC | Age: 42
End: 2022-05-01
Payer: COMMERCIAL

## 2022-05-01 DIAGNOSIS — F19.20 DRUG DEPENDENCE ON MAINTENANCE AGONIST THERAPY, NO SYMPTOMS (H): ICD-10-CM

## 2022-05-01 DIAGNOSIS — F19.11 HISTORY OF DRUG ABUSE IN REMISSION (H): ICD-10-CM

## 2022-05-04 RX ORDER — BUPRENORPHINE AND NALOXONE 8; 2 MG/1; MG/1
FILM, SOLUBLE BUCCAL; SUBLINGUAL
Qty: 90 EACH | Refills: 0 | OUTPATIENT
Start: 2022-05-04

## 2022-05-04 RX ORDER — BUPRENORPHINE AND NALOXONE 8; 2 MG/1; MG/1
FILM, SOLUBLE BUCCAL; SUBLINGUAL
Qty: 90 EACH | Refills: 0 | Status: SHIPPED | OUTPATIENT
Start: 2022-05-04 | End: 2022-05-31

## 2022-05-04 NOTE — TELEPHONE ENCOUNTER
Routing refill request to provider for review/approval because:  This is a controlled substance     Last Written Prescription Date:  4/5/2022  Last Fill Quantity: 90,  # refills: 0   Last office visit provider:  1/7/2022     Requested Prescriptions   Pending Prescriptions Disp Refills     buprenorphine HCl-naloxone HCl (SUBOXONE) 8-2 MG per film 90 each 0     Sig: Place 1.5 film under tongue 2 (two) times per day.       There is no refill protocol information for this order          Obdulia Aguiar RN 05/04/22 5:28 PM

## 2022-05-05 NOTE — TELEPHONE ENCOUNTER
Left message to call back for: Omero  Information to relay to patient: See message below from provider. Please relay and schedule patient.

## 2022-05-06 NOTE — TELEPHONE ENCOUNTER
Left message to call back for: Omero  Information to relay to patient: See message below.    Left message x2. MyChart message sent.

## 2022-05-20 ENCOUNTER — OFFICE VISIT (OUTPATIENT)
Dept: FAMILY MEDICINE | Facility: CLINIC | Age: 42
End: 2022-05-20
Payer: COMMERCIAL

## 2022-05-20 VITALS
DIASTOLIC BLOOD PRESSURE: 78 MMHG | HEART RATE: 64 BPM | TEMPERATURE: 98.5 F | WEIGHT: 225.2 LBS | SYSTOLIC BLOOD PRESSURE: 112 MMHG | HEIGHT: 66 IN | RESPIRATION RATE: 12 BRPM | BODY MASS INDEX: 36.19 KG/M2

## 2022-05-20 DIAGNOSIS — F19.20 DRUG DEPENDENCE ON MAINTENANCE AGONIST THERAPY, NO SYMPTOMS (H): Primary | ICD-10-CM

## 2022-05-20 LAB
AMPHETAMINES UR QL: NOT DETECTED
BARBITURATES UR QL SCN: NOT DETECTED
BENZODIAZ UR QL SCN: NOT DETECTED
BUPRENORPHINE UR QL: DETECTED
CANNABINOIDS UR QL: NOT DETECTED
COCAINE UR QL SCN: NOT DETECTED
D-METHAMPHET UR QL: NOT DETECTED
METHADONE UR QL SCN: NOT DETECTED
OPIATES UR QL SCN: NOT DETECTED
OXYCODONE UR QL SCN: NOT DETECTED
PCP UR QL SCN: NOT DETECTED
PROPOXYPH UR QL: NOT DETECTED
TRICYCLICS UR QL SCN: NOT DETECTED

## 2022-05-20 PROCEDURE — 99213 OFFICE O/P EST LOW 20 MIN: CPT | Performed by: FAMILY MEDICINE

## 2022-05-20 PROCEDURE — 80306 DRUG TEST PRSMV INSTRMNT: CPT | Performed by: FAMILY MEDICINE

## 2022-05-20 ASSESSMENT — ANXIETY QUESTIONNAIRES
IF YOU CHECKED OFF ANY PROBLEMS ON THIS QUESTIONNAIRE, HOW DIFFICULT HAVE THESE PROBLEMS MADE IT FOR YOU TO DO YOUR WORK, TAKE CARE OF THINGS AT HOME, OR GET ALONG WITH OTHER PEOPLE: NOT DIFFICULT AT ALL
5. BEING SO RESTLESS THAT IT IS HARD TO SIT STILL: NOT AT ALL
1. FEELING NERVOUS, ANXIOUS, OR ON EDGE: NOT AT ALL
3. WORRYING TOO MUCH ABOUT DIFFERENT THINGS: NOT AT ALL
6. BECOMING EASILY ANNOYED OR IRRITABLE: SEVERAL DAYS
GAD7 TOTAL SCORE: 1
4. TROUBLE RELAXING: NOT AT ALL
8. IF YOU CHECKED OFF ANY PROBLEMS, HOW DIFFICULT HAVE THESE MADE IT FOR YOU TO DO YOUR WORK, TAKE CARE OF THINGS AT HOME, OR GET ALONG WITH OTHER PEOPLE?: NOT DIFFICULT AT ALL
2. NOT BEING ABLE TO STOP OR CONTROL WORRYING: NOT AT ALL
7. FEELING AFRAID AS IF SOMETHING AWFUL MIGHT HAPPEN: NOT AT ALL
GAD7 TOTAL SCORE: 1
7. FEELING AFRAID AS IF SOMETHING AWFUL MIGHT HAPPEN: NOT AT ALL
GAD7 TOTAL SCORE: 1

## 2022-05-20 ASSESSMENT — PATIENT HEALTH QUESTIONNAIRE - PHQ9
SUM OF ALL RESPONSES TO PHQ QUESTIONS 1-9: 0
SUM OF ALL RESPONSES TO PHQ QUESTIONS 1-9: 0
10. IF YOU CHECKED OFF ANY PROBLEMS, HOW DIFFICULT HAVE THESE PROBLEMS MADE IT FOR YOU TO DO YOUR WORK, TAKE CARE OF THINGS AT HOME, OR GET ALONG WITH OTHER PEOPLE: NOT DIFFICULT AT ALL

## 2022-05-20 ASSESSMENT — ENCOUNTER SYMPTOMS: CONSTITUTIONAL NEGATIVE: 1

## 2022-05-20 NOTE — ASSESSMENT & PLAN NOTE
Using Suboxone dosing has been stable for years.  PDMP reviewed.  Urine drugs of abuse screen is due today and will be completed.  Follow-up 2-3 months recommended.  We will plan for a face-to-face visit as this will serve as his diabetes visit.

## 2022-05-20 NOTE — PROGRESS NOTES
"  Drug dependence on maintenance agonist therapy, no symptoms (H)  Using Suboxone dosing has been stable for years.  PDMP reviewed.  Urine drugs of abuse screen is due today and will be completed.  Follow-up 2-3 months recommended.  We will plan for a face-to-face visit as this will serve as his diabetes visit.    Violet Jamil is a 41 year old who presents for the following health issues     History of Present Illness       Reason for visit:  Medication check    He eats 0-1 servings of fruits and vegetables daily.He consumes 1 sweetened beverage(s) daily.He exercises with enough effort to increase his heart rate 9 or less minutes per day.  He exercises with enough effort to increase his heart rate 3 or less days per week.   He is taking medications regularly.    Today's PHQ-9         PHQ-9 Total Score: 0    PHQ-9 Q9 Thoughts of better off dead/self-harm past 2 weeks :   Not at all    How difficult have these problems made it for you to do your work, take care of things at home, or get along with other people: Not difficult at all  Today's JOY-7 Score: 1    Review of Systems   Constitutional: Negative.    All other systems reviewed and are negative.           Objective    /78 (BP Location: Left arm, Patient Position: Sitting, Cuff Size: Adult Large)   Pulse 64   Temp 98.5  F (36.9  C) (Oral)   Resp 12   Ht 1.676 m (5' 6\")   Wt 102.2 kg (225 lb 3.2 oz)   BMI 36.35 kg/m    Body mass index is 36.35 kg/m .  Physical Exam  Nursing note reviewed.   Constitutional:       General: He is not in acute distress.     Appearance: Normal appearance. He is not ill-appearing.   HENT:      Head: Normocephalic and atraumatic.   Eyes:      Extraocular Movements: Extraocular movements intact.      Conjunctiva/sclera: Conjunctivae normal.   Pulmonary:      Effort: Pulmonary effort is normal.   Neurological:      Mental Status: He is alert and oriented to person, place, and time.   Psychiatric:         Attention and " Perception: Attention normal.         Mood and Affect: Mood normal.         Speech: Speech normal.         Thought Content: Thought content normal.

## 2022-06-28 ENCOUNTER — MYC REFILL (OUTPATIENT)
Dept: FAMILY MEDICINE | Facility: CLINIC | Age: 42
End: 2022-06-28

## 2022-06-28 DIAGNOSIS — F19.11 HISTORY OF DRUG ABUSE IN REMISSION (H): ICD-10-CM

## 2022-06-28 DIAGNOSIS — F19.20 DRUG DEPENDENCE ON MAINTENANCE AGONIST THERAPY, NO SYMPTOMS (H): ICD-10-CM

## 2022-06-29 RX ORDER — BUPRENORPHINE AND NALOXONE 8; 2 MG/1; MG/1
FILM, SOLUBLE BUCCAL; SUBLINGUAL
Qty: 90 EACH | Refills: 0 | Status: SHIPPED | OUTPATIENT
Start: 2022-06-29 | End: 2022-07-26

## 2022-07-26 ENCOUNTER — MYC REFILL (OUTPATIENT)
Dept: FAMILY MEDICINE | Facility: CLINIC | Age: 42
End: 2022-07-26

## 2022-07-26 DIAGNOSIS — F19.20 DRUG DEPENDENCE ON MAINTENANCE AGONIST THERAPY, NO SYMPTOMS (H): ICD-10-CM

## 2022-07-26 DIAGNOSIS — F19.11 HISTORY OF DRUG ABUSE IN REMISSION (H): ICD-10-CM

## 2022-07-26 NOTE — TELEPHONE ENCOUNTER
Recent prescribing pattern reviewed via PDMP.  His dispenses average every 28 days or so.  We will hold off refilling the medication till later this week (7/29).

## 2022-07-27 DIAGNOSIS — E78.00 HYPERCHOLESTEROLEMIA: ICD-10-CM

## 2022-07-27 DIAGNOSIS — E11.65 TYPE 2 DIABETES MELLITUS WITH HYPERGLYCEMIA, WITHOUT LONG-TERM CURRENT USE OF INSULIN (H): ICD-10-CM

## 2022-07-27 RX ORDER — PRAVASTATIN SODIUM 20 MG
TABLET ORAL
Qty: 90 TABLET | Refills: 0 | Status: SHIPPED | OUTPATIENT
Start: 2022-07-27 | End: 2022-11-07

## 2022-07-27 NOTE — TELEPHONE ENCOUNTER
"Routing refill request to provider for review/approval because:  Labs not current:  LDL  A break in medication    Last Written Prescription Date:  8/18/21  Last Fill Quantity: 90,  # refills: 1   Last office visit provider:  5/20/22     Requested Prescriptions   Pending Prescriptions Disp Refills     pravastatin (PRAVACHOL) 20 MG tablet [Pharmacy Med Name: PRAVASTATIN SODIUM 20 MG TAB] 30 tablet 11     Sig: TAKE 1 TABLET BY MOUTH EVERY DAY IN THE EVENING       Statins Protocol Failed - 7/27/2022  8:05 AM        Failed - LDL on file in past 12 months     Recent Labs   Lab Test 06/17/19  0930   LDL 97             Passed - No abnormal creatine kinase in past 12 months     No lab results found.             Passed - Recent (12 mo) or future (30 days) visit within the authorizing provider's specialty     Patient has had an office visit with the authorizing provider or a provider within the authorizing providers department within the previous 12 mos or has a future within next 30 days. See \"Patient Info\" tab in inbasket, or \"Choose Columns\" in Meds & Orders section of the refill encounter.              Passed - Medication is active on med list        Passed - Patient is age 18 or older             Costa Hyman RN 07/27/22 8:06 AM  "

## 2022-07-29 RX ORDER — BUPRENORPHINE AND NALOXONE 8; 2 MG/1; MG/1
FILM, SOLUBLE BUCCAL; SUBLINGUAL
Qty: 90 EACH | Refills: 0 | Status: SHIPPED | OUTPATIENT
Start: 2022-07-29 | End: 2022-08-26

## 2022-08-20 ENCOUNTER — HEALTH MAINTENANCE LETTER (OUTPATIENT)
Age: 42
End: 2022-08-20

## 2022-08-26 ENCOUNTER — MYC REFILL (OUTPATIENT)
Dept: FAMILY MEDICINE | Facility: CLINIC | Age: 42
End: 2022-08-26

## 2022-08-26 DIAGNOSIS — F19.11 HISTORY OF DRUG ABUSE IN REMISSION (H): ICD-10-CM

## 2022-08-26 DIAGNOSIS — F19.20 DRUG DEPENDENCE ON MAINTENANCE AGONIST THERAPY, NO SYMPTOMS (H): ICD-10-CM

## 2022-08-27 RX ORDER — BUPRENORPHINE AND NALOXONE 8; 2 MG/1; MG/1
FILM, SOLUBLE BUCCAL; SUBLINGUAL
Qty: 90 EACH | Refills: 0 | Status: SHIPPED | OUTPATIENT
Start: 2022-08-27 | End: 2022-09-23

## 2022-09-23 ENCOUNTER — MYC REFILL (OUTPATIENT)
Dept: FAMILY MEDICINE | Facility: CLINIC | Age: 42
End: 2022-09-23

## 2022-09-23 DIAGNOSIS — F19.11 HISTORY OF DRUG ABUSE IN REMISSION (H): ICD-10-CM

## 2022-09-23 DIAGNOSIS — F19.20 DRUG DEPENDENCE ON MAINTENANCE AGONIST THERAPY, NO SYMPTOMS (H): ICD-10-CM

## 2022-09-26 RX ORDER — BUPRENORPHINE AND NALOXONE 8; 2 MG/1; MG/1
FILM, SOLUBLE BUCCAL; SUBLINGUAL
Qty: 90 EACH | Refills: 0 | Status: SHIPPED | OUTPATIENT
Start: 2022-09-26 | End: 2022-10-24

## 2022-10-18 DIAGNOSIS — E11.65 TYPE 2 DIABETES MELLITUS WITH HYPERGLYCEMIA, WITHOUT LONG-TERM CURRENT USE OF INSULIN (H): ICD-10-CM

## 2022-10-18 RX ORDER — METFORMIN HCL 500 MG
1000 TABLET, EXTENDED RELEASE 24 HR ORAL
Qty: 180 TABLET | Refills: 0 | Status: SHIPPED | OUTPATIENT
Start: 2022-10-18 | End: 2023-01-10

## 2022-10-18 NOTE — TELEPHONE ENCOUNTER
"Routing refill request to provider for review/approval because:  Labs not current:  Multiple    Last Written Prescription Date:  10/4/21  Last Fill Quantity: 180,  # refills: 3   Last office visit provider:  5/20/22     Requested Prescriptions   Pending Prescriptions Disp Refills     metFORMIN (GLUCOPHAGE XR) 500 MG 24 hr tablet [Pharmacy Med Name: METFORMIN HCL  MG TABLET] 60 tablet 11     Sig: TAKE 2 TABLETS (1,000 MG) BY MOUTH DAILY (WITH BREAKFAST)       Biguanide Agents Failed - 10/18/2022  3:21 PM        Failed - Patient has documented A1c within the specified period of time.     If HgbA1C is 8 or greater, it needs to be on file within the past 3 months.  If less than 8, must be on file within the past 6 months.     Recent Labs   Lab Test 01/07/22  1107   A1C 6.5*             Failed - Patient's CR is NOT>1.4 OR Patient's EGFR is NOT<45 within past 12 mos.     Recent Labs   Lab Test 10/04/21  1303 01/07/21  1546   GFRESTIMATED >90 >60   GFRESTBLACK  --  >60       Recent Labs   Lab Test 10/04/21  1303   CR 0.87             Passed - Patient is age 10 or older        Passed - Patient does NOT have a diagnosis of CHF.        Passed - Medication is active on med list        Passed - Recent (6 mo) or future (30 days) visit within the authorizing provider's specialty     Patient had office visit in the last 6 months or has a visit in the next 30 days with authorizing provider or within the authorizing provider's specialty.  See \"Patient Info\" tab in inbasket, or \"Choose Columns\" in Meds & Orders section of the refill encounter.                 Costa Hyman RN 10/18/22 3:21 PM  "

## 2022-10-24 ENCOUNTER — MYC REFILL (OUTPATIENT)
Dept: FAMILY MEDICINE | Facility: CLINIC | Age: 42
End: 2022-10-24

## 2022-10-24 DIAGNOSIS — F19.11 HISTORY OF DRUG ABUSE IN REMISSION (H): ICD-10-CM

## 2022-10-24 DIAGNOSIS — F19.20 DRUG DEPENDENCE ON MAINTENANCE AGONIST THERAPY, NO SYMPTOMS (H): ICD-10-CM

## 2022-10-25 RX ORDER — BUPRENORPHINE AND NALOXONE 8; 2 MG/1; MG/1
FILM, SOLUBLE BUCCAL; SUBLINGUAL
Qty: 90 EACH | Refills: 0 | Status: SHIPPED | OUTPATIENT
Start: 2022-10-25 | End: 2022-11-22

## 2022-10-25 NOTE — TELEPHONE ENCOUNTER
Left message to call back for: Patient   Information to relay to patient: Patient overdue for Suboxone med check office visit.  Please help schedule.

## 2022-11-06 DIAGNOSIS — E11.65 TYPE 2 DIABETES MELLITUS WITH HYPERGLYCEMIA, WITHOUT LONG-TERM CURRENT USE OF INSULIN (H): ICD-10-CM

## 2022-11-06 DIAGNOSIS — E78.00 HYPERCHOLESTEROLEMIA: ICD-10-CM

## 2022-11-07 RX ORDER — PRAVASTATIN SODIUM 20 MG
TABLET ORAL
Qty: 30 TABLET | Refills: 2 | Status: SHIPPED | OUTPATIENT
Start: 2022-11-07 | End: 2023-05-01

## 2022-11-07 NOTE — TELEPHONE ENCOUNTER
"Routing refill request to provider for review/approval because:  Labs not current:  ldl    Last Written Prescription Date:  7/27/22  Last Fill Quantity: 90,  # refills: 0   Last office visit provider:  5/20/22     Requested Prescriptions   Pending Prescriptions Disp Refills     pravastatin (PRAVACHOL) 20 MG tablet [Pharmacy Med Name: PRAVASTATIN SODIUM 20 MG TAB] 30 tablet 2     Sig: TAKE 1 TABLET BY MOUTH EVERY DAY IN THE EVENING       Statins Protocol Failed - 11/6/2022 12:26 AM        Failed - LDL on file in past 12 months     Recent Labs   Lab Test 06/17/19  0930   LDL 97             Passed - No abnormal creatine kinase in past 12 months     No lab results found.             Passed - Recent (12 mo) or future (30 days) visit within the authorizing provider's specialty     Patient has had an office visit with the authorizing provider or a provider within the authorizing providers department within the previous 12 mos or has a future within next 30 days. See \"Patient Info\" tab in inbasket, or \"Choose Columns\" in Meds & Orders section of the refill encounter.              Passed - Medication is active on med list        Passed - Patient is age 18 or older             Laurie Benson, RN 11/07/22 1:40 PM  "

## 2022-11-19 ENCOUNTER — HEALTH MAINTENANCE LETTER (OUTPATIENT)
Age: 42
End: 2022-11-19

## 2022-11-21 DIAGNOSIS — I10 ESSENTIAL (PRIMARY) HYPERTENSION: ICD-10-CM

## 2022-11-21 RX ORDER — LISINOPRIL 20 MG/1
TABLET ORAL
Qty: 30 TABLET | Refills: 11 | Status: SHIPPED | OUTPATIENT
Start: 2022-11-21 | End: 2023-12-04

## 2022-11-21 NOTE — TELEPHONE ENCOUNTER
"Routing refill request to provider for review/approval because:  Labs not current:  Cr k    Last Written Prescription Date:  11/15/21  Last Fill Quantity: 90,  # refills: 3   Last office visit provider:  1/7/22     Requested Prescriptions   Pending Prescriptions Disp Refills     lisinopril (ZESTRIL) 20 MG tablet [Pharmacy Med Name: LISINOPRIL 20 MG TABLET] 30 tablet 11     Sig: TAKE 1 TABLET BY MOUTH EVERY DAY       ACE Inhibitors (Including Combos) Protocol Failed - 11/21/2022 12:28 AM        Failed - Normal serum creatinine on file in past 12 months     Recent Labs   Lab Test 10/04/21  1303   CR 0.87       Ok to refill medication if creatinine is low          Failed - Normal serum potassium on file in past 12 months     Recent Labs   Lab Test 10/04/21  1303   POTASSIUM 4.2             Passed - Blood pressure under 140/90 in past 12 months     BP Readings from Last 3 Encounters:   05/20/22 112/78   01/07/22 126/70   10/04/21 130/80                 Passed - Recent (12 mo) or future (30 days) visit within the authorizing provider's specialty     Patient has had an office visit with the authorizing provider or a provider within the authorizing providers department within the previous 12 mos or has a future within next 30 days. See \"Patient Info\" tab in inbasket, or \"Choose Columns\" in Meds & Orders section of the refill encounter.              Passed - Medication is active on med list        Passed - Patient is age 18 or older             Laurie Benson RN 11/21/22 11:49 AM  "

## 2022-12-09 ENCOUNTER — OFFICE VISIT (OUTPATIENT)
Dept: FAMILY MEDICINE | Facility: CLINIC | Age: 42
End: 2022-12-09
Payer: COMMERCIAL

## 2022-12-09 VITALS
BODY MASS INDEX: 35.43 KG/M2 | DIASTOLIC BLOOD PRESSURE: 80 MMHG | HEART RATE: 88 BPM | WEIGHT: 220.44 LBS | HEIGHT: 66 IN | RESPIRATION RATE: 16 BRPM | SYSTOLIC BLOOD PRESSURE: 140 MMHG | TEMPERATURE: 98.1 F | OXYGEN SATURATION: 96 %

## 2022-12-09 DIAGNOSIS — F10.21 ALCOHOL DEPENDENCE IN REMISSION (H): ICD-10-CM

## 2022-12-09 DIAGNOSIS — I10 ESSENTIAL HYPERTENSION: Primary | ICD-10-CM

## 2022-12-09 DIAGNOSIS — F19.20 DRUG DEPENDENCE ON MAINTENANCE AGONIST THERAPY, NO SYMPTOMS (H): ICD-10-CM

## 2022-12-09 PROBLEM — F10.20 ALCOHOL DEPENDENCE (H): Status: RESOLVED | Noted: 2022-01-26 | Resolved: 2022-12-09

## 2022-12-09 PROCEDURE — 99213 OFFICE O/P EST LOW 20 MIN: CPT | Performed by: FAMILY MEDICINE

## 2022-12-09 ASSESSMENT — ENCOUNTER SYMPTOMS: CONSTITUTIONAL NEGATIVE: 1

## 2022-12-09 NOTE — PROGRESS NOTES
"    Problem List Items Addressed This Visit     Alcohol dependence in remission (H)    Drug dependence on maintenance agonist therapy, no symptoms (H)     Stable. No concerns.  Medication helpful.  No side effects.  We did not address metabolic health.  Recheck in 3-6 months.          Essential hypertension - Primary      Follow-up Visit   Expected date:  Mar 09, 2023 (Approximate)      Follow Up Appointment Details:     Follow-up with whom?: Me    Follow-Up for what?: Chronic Disease f/u    Chronic Disease f/u:  Diabetes  General (Other)       Additional Details: Suboxone checl    How?: In Person or Virtual    Is this an as-needed follow-up?: No                  Subjective   Omero is a 42 year old who presents for the following health issues   Chief Complaint   Patient presents with     Follow Up     Suboxone      Suboxone:   - the patient has not been careful with blood sugars.  Son will help him with nutrition.  Now planning to join gym.    - interested in taper once he start to exercise and eat well (plan in place).     History of Present Illness       Reason for visit:  Medication check    He eats 2-3 servings of fruits and vegetables daily.He consumes 2 sweetened beverage(s) daily.He exercises with enough effort to increase his heart rate 20 to 29 minutes per day.  He exercises with enough effort to increase his heart rate 5 days per week.   He is taking medications regularly.     Review of Systems   Constitutional: Negative.    All other systems reviewed and are negative.           Objective    BP (!) 140/80 (BP Location: Left arm, Patient Position: Sitting, Cuff Size: Adult Large)   Pulse 88   Temp 98.1  F (36.7  C) (Oral)   Resp 16   Ht 1.676 m (5' 6\")   Wt 100 kg (220 lb 7 oz)   SpO2 96%   BMI 35.58 kg/m    Body mass index is 35.58 kg/m .  Physical Exam  Nursing note reviewed.   Constitutional:       General: He is not in acute distress.     Appearance: Normal appearance. He is not ill-appearing. "   HENT:      Head: Normocephalic and atraumatic.   Eyes:      Extraocular Movements: Extraocular movements intact.      Conjunctiva/sclera: Conjunctivae normal.   Pulmonary:      Effort: Pulmonary effort is normal.   Neurological:      Mental Status: He is alert and oriented to person, place, and time.   Psychiatric:         Attention and Perception: Attention normal.         Mood and Affect: Mood normal.         Speech: Speech normal.         Thought Content: Thought content normal.              This note has been dictated using voice recognition software. Any grammatical or context distortions are unintentional and inherent to the software

## 2022-12-09 NOTE — ASSESSMENT & PLAN NOTE
Stable. No concerns.  Medication helpful.  No side effects.  We did not address metabolic health.  Recheck in 3-6 months.

## 2022-12-18 ENCOUNTER — HEALTH MAINTENANCE LETTER (OUTPATIENT)
Age: 42
End: 2022-12-18

## 2022-12-19 ENCOUNTER — MYC REFILL (OUTPATIENT)
Dept: FAMILY MEDICINE | Facility: CLINIC | Age: 42
End: 2022-12-19

## 2022-12-19 DIAGNOSIS — F19.11 HISTORY OF DRUG ABUSE IN REMISSION (H): ICD-10-CM

## 2022-12-19 DIAGNOSIS — F19.20 DRUG DEPENDENCE ON MAINTENANCE AGONIST THERAPY, NO SYMPTOMS (H): ICD-10-CM

## 2022-12-21 RX ORDER — BUPRENORPHINE AND NALOXONE 8; 2 MG/1; MG/1
FILM, SOLUBLE BUCCAL; SUBLINGUAL
Qty: 90 EACH | Refills: 0 | Status: SHIPPED | OUTPATIENT
Start: 2022-12-21 | End: 2023-01-19

## 2023-01-08 DIAGNOSIS — E11.65 TYPE 2 DIABETES MELLITUS WITH HYPERGLYCEMIA, WITHOUT LONG-TERM CURRENT USE OF INSULIN (H): ICD-10-CM

## 2023-01-10 RX ORDER — METFORMIN HCL 500 MG
1000 TABLET, EXTENDED RELEASE 24 HR ORAL
Qty: 180 TABLET | Refills: 0 | Status: SHIPPED | OUTPATIENT
Start: 2023-01-10 | End: 2023-04-10

## 2023-01-10 NOTE — TELEPHONE ENCOUNTER
"Routing refill request to provider for review/approval because:  Labs not current:  Multiple    Last Written Prescription Date:  10/18/22  Last Fill Quantity: 180,  # refills: 0   Last office visit provider:  12/9/22     Requested Prescriptions   Pending Prescriptions Disp Refills     metFORMIN (GLUCOPHAGE XR) 500 MG 24 hr tablet [Pharmacy Med Name: METFORMIN HCL  MG TABLET] 60 tablet 2     Sig: TAKE 2 TABLETS (1,000 MG) BY MOUTH DAILY (WITH BREAKFAST)       Biguanide Agents Failed - 1/10/2023  1:20 PM        Failed - Patient has documented A1c within the specified period of time.     If HgbA1C is 8 or greater, it needs to be on file within the past 3 months.  If less than 8, must be on file within the past 6 months.     Recent Labs   Lab Test 01/07/22  1107   A1C 6.5*             Failed - Patient's CR is NOT>1.4 OR Patient's EGFR is NOT<45 within past 12 mos.     Recent Labs   Lab Test 10/04/21  1303 01/07/21  1546   GFRESTIMATED >90 >60   GFRESTBLACK  --  >60       Recent Labs   Lab Test 10/04/21  1303   CR 0.87             Passed - Patient is age 10 or older        Passed - Patient does NOT have a diagnosis of CHF.        Passed - Medication is active on med list        Passed - Recent (6 mo) or future (30 days) visit within the authorizing provider's specialty     Patient had office visit in the last 6 months or has a visit in the next 30 days with authorizing provider or within the authorizing provider's specialty.  See \"Patient Info\" tab in inbasket, or \"Choose Columns\" in Meds & Orders section of the refill encounter.                 Costa Hyman RN 01/10/23 1:20 PM  "

## 2023-02-05 DIAGNOSIS — E66.01 MORBID (SEVERE) OBESITY DUE TO EXCESS CALORIES (H): ICD-10-CM

## 2023-02-05 DIAGNOSIS — E11.65 TYPE 2 DIABETES MELLITUS WITH HYPERGLYCEMIA (H): ICD-10-CM

## 2023-02-06 RX ORDER — ASPIRIN 81 MG/1
TABLET, COATED ORAL
Qty: 90 TABLET | Refills: 3 | Status: SHIPPED | OUTPATIENT
Start: 2023-02-06 | End: 2024-02-13

## 2023-02-06 NOTE — TELEPHONE ENCOUNTER
"  Last Written Prescription Date:  1/21/2022  Last Fill Quantity: 90,  # refills: 3   Last office visit provider:  12/9/2022     Requested Prescriptions   Pending Prescriptions Disp Refills     ASPIRIN LOW DOSE 81 MG EC tablet [Pharmacy Med Name: ASPIRIN EC 81 MG TABLET] 30 tablet 11     Sig: TAKE 1 TABLET BY MOUTH EVERY DAY       Analgesics (Non-Narcotic Tylenol and ASA Only) Passed - 2/6/2023  1:08 PM        Passed - Recent (12 mo) or future (30 days) visit within the authorizing provider's specialty     Patient has had an office visit with the authorizing provider or a provider within the authorizing providers department within the previous 12 mos or has a future within next 30 days. See \"Patient Info\" tab in inbasket, or \"Choose Columns\" in Meds & Orders section of the refill encounter.              Passed - Patient is age 20 years or older     If ASA is flagged for ages under 20 years old. Forward to provider for confirmation Ryes Syndrome is not a concern.              Passed - Medication is active on med list             Willa Ferrari RN 02/06/23 1:09 PM  "

## 2023-02-17 ENCOUNTER — MYC REFILL (OUTPATIENT)
Dept: FAMILY MEDICINE | Facility: CLINIC | Age: 43
End: 2023-02-17
Payer: COMMERCIAL

## 2023-02-17 DIAGNOSIS — F19.20 DRUG DEPENDENCE ON MAINTENANCE AGONIST THERAPY, NO SYMPTOMS (H): ICD-10-CM

## 2023-02-17 DIAGNOSIS — F19.11 HISTORY OF DRUG ABUSE IN REMISSION (H): ICD-10-CM

## 2023-02-17 RX ORDER — BUPRENORPHINE AND NALOXONE 8; 2 MG/1; MG/1
FILM, SOLUBLE BUCCAL; SUBLINGUAL
Qty: 90 EACH | Refills: 0 | Status: SHIPPED | OUTPATIENT
Start: 2023-02-17 | End: 2023-03-17

## 2023-03-17 ENCOUNTER — MYC REFILL (OUTPATIENT)
Dept: FAMILY MEDICINE | Facility: CLINIC | Age: 43
End: 2023-03-17
Payer: COMMERCIAL

## 2023-03-17 DIAGNOSIS — F19.20 DRUG DEPENDENCE ON MAINTENANCE AGONIST THERAPY, NO SYMPTOMS (H): ICD-10-CM

## 2023-03-17 DIAGNOSIS — F19.11 HISTORY OF DRUG ABUSE IN REMISSION (H): ICD-10-CM

## 2023-03-17 NOTE — TELEPHONE ENCOUNTER
I will refill this medication.  However, the patient does need a follow-up appointment.  Please call and schedule and then I will refill.

## 2023-03-20 RX ORDER — BUPRENORPHINE AND NALOXONE 8; 2 MG/1; MG/1
FILM, SOLUBLE BUCCAL; SUBLINGUAL
Qty: 90 EACH | Refills: 0 | Status: SHIPPED | OUTPATIENT
Start: 2023-03-20 | End: 2023-04-17

## 2023-03-20 NOTE — TELEPHONE ENCOUNTER
Left message to call back for: Patient  Information to relay to patient: See provider message below and help schedule. Please route back to provider pool for bridge refill of medication.

## 2023-03-30 ENCOUNTER — OFFICE VISIT (OUTPATIENT)
Dept: FAMILY MEDICINE | Facility: CLINIC | Age: 43
End: 2023-03-30
Payer: COMMERCIAL

## 2023-03-30 VITALS
HEART RATE: 89 BPM | DIASTOLIC BLOOD PRESSURE: 88 MMHG | OXYGEN SATURATION: 100 % | BODY MASS INDEX: 35.5 KG/M2 | WEIGHT: 220.9 LBS | SYSTOLIC BLOOD PRESSURE: 152 MMHG | HEIGHT: 66 IN

## 2023-03-30 DIAGNOSIS — F10.21 ALCOHOL DEPENDENCE IN REMISSION (H): ICD-10-CM

## 2023-03-30 DIAGNOSIS — F19.20 DRUG DEPENDENCE ON MAINTENANCE AGONIST THERAPY, NO SYMPTOMS (H): ICD-10-CM

## 2023-03-30 DIAGNOSIS — E11.65 TYPE 2 DIABETES MELLITUS WITH HYPERGLYCEMIA, WITHOUT LONG-TERM CURRENT USE OF INSULIN (H): ICD-10-CM

## 2023-03-30 DIAGNOSIS — E66.01 CLASS 2 SEVERE OBESITY DUE TO EXCESS CALORIES WITH SERIOUS COMORBIDITY AND BODY MASS INDEX (BMI) OF 36.0 TO 36.9 IN ADULT (H): ICD-10-CM

## 2023-03-30 DIAGNOSIS — I10 ESSENTIAL HYPERTENSION: ICD-10-CM

## 2023-03-30 DIAGNOSIS — E66.812 CLASS 2 SEVERE OBESITY DUE TO EXCESS CALORIES WITH SERIOUS COMORBIDITY AND BODY MASS INDEX (BMI) OF 36.0 TO 36.9 IN ADULT (H): ICD-10-CM

## 2023-03-30 LAB
AMPHETAMINES UR QL: NOT DETECTED
BARBITURATES UR QL SCN: NOT DETECTED
BENZODIAZ UR QL SCN: NOT DETECTED
BUPRENORPHINE UR QL: DETECTED
CANNABINOIDS UR QL: NOT DETECTED
COCAINE UR QL SCN: NOT DETECTED
CREAT UR-MCNC: 92.4 MG/DL
D-METHAMPHET UR QL: NOT DETECTED
METHADONE UR QL SCN: NOT DETECTED
MICROALBUMIN UR-MCNC: <12 MG/L
MICROALBUMIN/CREAT UR: NORMAL MG/G{CREAT}
OPIATES UR QL SCN: NOT DETECTED
OXYCODONE UR QL SCN: NOT DETECTED
PCP UR QL SCN: NOT DETECTED
PROPOXYPH UR QL: NOT DETECTED
TRICYCLICS UR QL SCN: NOT DETECTED

## 2023-03-30 PROCEDURE — 82043 UR ALBUMIN QUANTITATIVE: CPT | Performed by: FAMILY MEDICINE

## 2023-03-30 PROCEDURE — 99214 OFFICE O/P EST MOD 30 MIN: CPT | Performed by: FAMILY MEDICINE

## 2023-03-30 PROCEDURE — 82570 ASSAY OF URINE CREATININE: CPT | Mod: 59 | Performed by: FAMILY MEDICINE

## 2023-03-30 PROCEDURE — 80306 DRUG TEST PRSMV INSTRMNT: CPT | Performed by: FAMILY MEDICINE

## 2023-03-30 ASSESSMENT — ENCOUNTER SYMPTOMS: CONSTITUTIONAL NEGATIVE: 1

## 2023-03-30 NOTE — LETTER
Opioid / Opioid Plus Controlled Substance Agreement    This is an agreement between you and your provider about the safe and appropriate use of controlled substance/opioids prescribed by your care team. Controlled substances are medicines that can cause physical and mental dependence (abuse).    There are strict laws about having and using these medicines. We here at [facility] are committing to working with you in your efforts to get better. To support you in this work, we ll help you schedule regular office appointments for medicine refills. If we must cancel or change your appointment for any reason, we ll make sure you have enough medicine to last until your next appointment.     As a Provider, I will:    Listen carefully to your concerns and treat you with respect.     Recommend a treatment plan that I believe is in your best interest. This plan may involve therapies other than opioid pain medication.     Talk with you often about the possible benefits, and the risk of harm of any medicine that we prescribe for you.     Provide a plan on how to taper (discontinue or go off) using this medicine if the decision is made to stop its use.    As a Patient, I understand that opioid(s):     Are a controlled substance prescribed by my care team to help me function or work and manage my condition(s).     Are strong medicines and can cause serious side effects such as:    Drowsiness, which can seriously affect my driving ability    A lower breathing rate, enough to cause death    Harm to my thinking ability     Depression     Abuse of and addiction to this medicine    Need to be taken exactly as prescribed. Combining opioids with certain medicines or chemicals (such as illegal drugs, sedatives, sleeping pills, and benzodiazepines) can be dangerous or even fatal. If I stop opioids suddenly, I may have severe withdrawal symptoms.    Do not work for all types of pain nor for all patients. If they re not helpful, I may  be asked to stop them.    {Benzo / Stimulant (Optional):707628}    The risks, benefits and side effects of these medicine(s) were explained to me. I agree that:  1. I will take part in other treatments as advised by my care team. This may be psychiatry or counseling, physical therapy, behavioral therapy, group treatment or a referral to a specialist.     2. I will keep all my appointments. I understand that this is part of the monitoring of opioids. My care team may require an office visit for EVERY opioid/controlled substance refill. If I miss appointments or don t follow instructions, my care team may stop my medicine.    3. I will take my medicines as prescribed. I will not change the dose or schedule unless my care team tells me to. There will be no refills if I run out early.     4. I may be asked to come to the clinic and complete a urine drug test or complete a pill count at any time. If I don t give a urine sample or participate in a pill count, the care team may stop my medicine.    5. I will only receive prescriptions from this clinic for chronic pain. If I am treated by another provider for acute pain issues, I will tell them that I am taking opioid pain medication for chronic pain and that I have a treatment agreement with this provider. I will inform my [facility] care team within one business day if I am given a prescription for any pain medication by another healthcare provider. My [facility] care team can contact other providers and pharmacists about my use of any medicines.    6. It is up to me to make sure that I don t run out of my medicines on weekends or holidays. If my care team is willing to refill my opioid prescription without a visit, I must request refills only during office hours. Refills may take up to 3 business days to process. I will use one pharmacy to fill all my opioid and other controlled substance prescriptions. I will notify the clinic about any changes to my  insurance or medication availability.    7. I am responsible for my prescriptions. If the medicine/prescription is lost, stolen or destroyed, it will not be replaced. I also agree not to share controlled substance medicines with anyone.    8. I am aware I should not use any illegal or recreational drugs. I agree not to drink alcohol unless my care team says I can.       9. If I enroll in the [place] [facility] program, I will tell my care team prior to my next refill.     10. I will tell my care team right away if I become pregnant, have a new medical problem treated outside of my regular clinic, or have a change in my medications.    11. I understand that this medicine can affect my thinking, judgment and reaction time. Alcohol and drugs affect the brain and body, which can affect the safety of my driving. Being under the influence of alcohol or drugs can affect my decision-making, behaviors, personal safety, and the safety of others. Driving while impaired (DWI) can occur if a person is driving, operating, or in physical control of a car, motorcycle, boat, snowmobile, ATV, motorbike, off-road vehicle, or any other motor vehicle ([place] Statute 169A.20). I understand the risk if I choose to drive or operate any vehicle or machinery.    I understand that if I do not follow any of the conditions above, my prescriptions or treatment may be stopped or changed.          Opioids  What You Need to Know    What are opioids?   Opioids are pain medicines that must be prescribed by a doctor. They are also known as narcotics.     Examples are:   1. morphine (MS Contin, Nuvia)  2. oxycodone (Oxycontin)  3. oxycodone and acetaminophen (Percocet)  4. hydrocodone and acetaminophen (Vicodin, Norco)   5. fentanyl patch (Duragesic)   6. hydromorphone (Dilaudid)   7. methadone  8. codeine (Tylenol #3)     What do opioids do well?   Opioids are best for severe short-term pain such as after a surgery or injury. They may work well  for cancer pain. They may help some people with long-lasting (chronic) pain.     What do opioids NOT do well?   Opioids never get rid of pain entirely, and they don t work well for most patients with chronic pain. Opioids don t reduce swelling, one of the causes of pain.                                    Other ways to manage chronic pain and improve function include:       Treat the health problem that may be causing pain    Anti-inflammation medicines, which reduce swelling and tenderness, such as ibuprofen (Advil, Motrin) or naproxen (Aleve)    Acetaminophen (Tylenol)    Antidepressants and anti-seizure medicines, especially for nerve pain    Topical treatments such as patches or creams    Injections or nerve blocks    Chiropractic or osteopathic treatment    Acupuncture, massage, deep breathing, meditation, visual imagery, aromatherapy    Use heat or ice at the pain site    Physical therapy     Exercise    Stop smoking    Take part in therapy       Risks and side effects     Talk to your doctor before you start or decide to keep taking opioids. Possible side effects include:      Lowering your breathing rate enough to cause death    Overdose, including death, especially if taking higher than prescribed doses    Worse depression symptoms; less pleasure in things you usually enjoy    Feeling tired or sluggish    Slower thoughts or cloudy thinking    Being more sensitive to pain over time; pain is harder to control    Trouble sleeping or restless sleep    Changes in hormone levels (for example, less testosterone)    Changes in sex drive or ability to have sex    Constipation    Unsafe driving    Itching and sweating    Dizziness    Nausea, throwing up and dry mouth    What else should I know about opioids?    Opioids may lead to dependence, tolerance, or addiction.      Dependence means that if you stop or reduce the medicine too quickly, you will have withdrawal symptoms. These include loose poop (diarrhea),  jitters, flu-like symptoms, nervousness and tremors. Dependence is not the same as addiction.                       Tolerance means needing higher doses over time to get the same effect. This may increase the chance of serious side effects.      Addiction is when people improperly use a substance that harms their body, their mind or their relations with others. Use of opiates can cause a relapse of addiction if you have a history of drug or alcohol abuse.      People who have used opioids for a long time may have a lower quality of life, worse depression, higher levels of pain and more visits to doctors.    You can overdose on opioids. Take these steps to lower your risk of overdose:    1. Recognize the signs:  Signs of overdose include decrease or loss of consciousness (blackout), slowed breathing, trouble waking up and blue lips. If someone is worried about overdose, they should call 911.    2. Talk to your doctor about Narcan (naloxone).   If you are at risk for overdose, you may be given a prescription for Narcan. This medicine very quickly reverses the effects of opioids.   If you overdose, a friend or family member can give you Narcan while waiting for the ambulance. They need to know the signs of overdose and how to give Narcan.     3. Don't use alcohol or street drugs.   Taking them with opioids can cause death.    4. Do not take any of these medicines unless your doctor says it s OK. Taking these with opioids can cause death:    Benzodiazepines, such as lorazepam (Ativan), alprazolam (Xanax) or diazepam (Valium)    Muscle relaxers, such as cyclobenzaprine (Flexeril)    Sleeping pills like zolpidem (Ambien)     Other opioids      How to keep you and other people safe while taking opioids:    1. Never share your opioids with others.  Opioid medicines are regulated by the Drug Enforcement Agency (FERMIN). Selling or sharing medications is a criminal act.    2. Be sure to store opioids in a secure place, locked up  if possible. Young children can easily swallow them and overdose.    3. When you are traveling with your medicines, keep them in the original bottles. If you use a pill box, be sure you also carry a copy of your medicine list from your clinic or pharmacy.    4. Safe disposal of opioids    Most pharmacies have places to get rid of medicine, called disposal kiosks. Medicine disposal options are also available in every [facility]. Search your county and  medication disposal  to find more options. You can find more details at:  https://www.pca.[facility].[place]./living-green/managing-unwanted-medications     I agree that my provider, clinic care team, and pharmacy may work with any city, state or federal law enforcement agency that investigates the misuse, sale, or other diversion of my controlled medicine. I will allow my provider to discuss my care with, or share a copy of, this agreement with any other treating provider, pharmacy or emergency room where I receive care.    I have read this agreement and have asked questions about anything I did not understand.    _______________________________________________________  Patient Signature - Omero Mota _____________________                   Date     _______________________________________________________  Provider Signature - Demetris Osman MD   _____________________                   Date     _______________________________________________________  Witness Signature (required if provider not present while patient signing)   _____________________                   Date

## 2023-03-30 NOTE — LETTER
Opioid / Opioid Plus Controlled Substance Agreement    This is an agreement between you and your provider about the safe and appropriate use of controlled substance/opioids prescribed by your care team. Controlled substances are medicines that can cause physical and mental dependence (abuse).    There are strict laws about having and using these medicines. We here at [facility] are committing to working with you in your efforts to get better. To support you in this work, we ll help you schedule regular office appointments for medicine refills. If we must cancel or change your appointment for any reason, we ll make sure you have enough medicine to last until your next appointment.     As a Provider, I will:    Listen carefully to your concerns and treat you with respect.     Recommend a treatment plan that I believe is in your best interest. This plan may involve therapies other than opioid pain medication.     Talk with you often about the possible benefits, and the risk of harm of any medicine that we prescribe for you.     Provide a plan on how to taper (discontinue or go off) using this medicine if the decision is made to stop its use.    As a Patient, I understand that opioid(s):     Are a controlled substance prescribed by my care team to help me function or work and manage my condition(s).     Are strong medicines and can cause serious side effects such as:    Drowsiness, which can seriously affect my driving ability    A lower breathing rate, enough to cause death    Harm to my thinking ability     Depression     Abuse of and addiction to this medicine    Need to be taken exactly as prescribed. Combining opioids with certain medicines or chemicals (such as illegal drugs, sedatives, sleeping pills, and benzodiazepines) can be dangerous or even fatal. If I stop opioids suddenly, I may have severe withdrawal symptoms.    Do not work for all types of pain nor for all patients. If they re not helpful, I may  be asked to stop them.        The risks, benefits and side effects of these medicine(s) were explained to me. I agree that:  1. I will take part in other treatments as advised by my care team. This may be psychiatry or counseling, physical therapy, behavioral therapy, group treatment or a referral to a specialist.     2. I will keep all my appointments. I understand that this is part of the monitoring of opioids. My care team may require an office visit for EVERY opioid/controlled substance refill. If I miss appointments or don t follow instructions, my care team may stop my medicine.    3. I will take my medicines as prescribed. I will not change the dose or schedule unless my care team tells me to. There will be no refills if I run out early.     4. I may be asked to come to the clinic and complete a urine drug test or complete a pill count at any time. If I don t give a urine sample or participate in a pill count, the care team may stop my medicine.    5. I will only receive prescriptions from this clinic for chronic pain. If I am treated by another provider for acute pain issues, I will tell them that I am taking opioid pain medication for chronic pain and that I have a treatment agreement with this provider. I will inform my [facility] care team within one business day if I am given a prescription for any pain medication by another healthcare provider. My [facility] care team can contact other providers and pharmacists about my use of any medicines.    6. It is up to me to make sure that I don t run out of my medicines on weekends or holidays. If my care team is willing to refill my opioid prescription without a visit, I must request refills only during office hours. Refills may take up to 3 business days to process. I will use one pharmacy to fill all my opioid and other controlled substance prescriptions. I will notify the clinic about any changes to my insurance or medication  availability.    7. I am responsible for my prescriptions. If the medicine/prescription is lost, stolen or destroyed, it will not be replaced. I also agree not to share controlled substance medicines with anyone.    8. I am aware I should not use any illegal or recreational drugs. I agree not to drink alcohol unless my care team says I can.       9. If I enroll in the [place] [facility] program, I will tell my care team prior to my next refill.     10. I will tell my care team right away if I become pregnant, have a new medical problem treated outside of my regular clinic, or have a change in my medications.    11. I understand that this medicine can affect my thinking, judgment and reaction time. Alcohol and drugs affect the brain and body, which can affect the safety of my driving. Being under the influence of alcohol or drugs can affect my decision-making, behaviors, personal safety, and the safety of others. Driving while impaired (DWI) can occur if a person is driving, operating, or in physical control of a car, motorcycle, boat, snowmobile, ATV, motorbike, off-road vehicle, or any other motor vehicle ([place] Statute 169A.20). I understand the risk if I choose to drive or operate any vehicle or machinery.    I understand that if I do not follow any of the conditions above, my prescriptions or treatment may be stopped or changed.          Opioids  What You Need to Know    What are opioids?   Opioids are pain medicines that must be prescribed by a doctor. They are also known as narcotics.     Examples are:   1. morphine (MS Contin, Nuvia)  2. oxycodone (Oxycontin)  3. oxycodone and acetaminophen (Percocet)  4. hydrocodone and acetaminophen (Vicodin, Norco)   5. fentanyl patch (Duragesic)   6. hydromorphone (Dilaudid)   7. methadone  8. codeine (Tylenol #3)     What do opioids do well?   Opioids are best for severe short-term pain such as after a surgery or injury. They may work well for cancer pain. They may  help some people with long-lasting (chronic) pain.     What do opioids NOT do well?   Opioids never get rid of pain entirely, and they don t work well for most patients with chronic pain. Opioids don t reduce swelling, one of the causes of pain.                                    Other ways to manage chronic pain and improve function include:       Treat the health problem that may be causing pain    Anti-inflammation medicines, which reduce swelling and tenderness, such as ibuprofen (Advil, Motrin) or naproxen (Aleve)    Acetaminophen (Tylenol)    Antidepressants and anti-seizure medicines, especially for nerve pain    Topical treatments such as patches or creams    Injections or nerve blocks    Chiropractic or osteopathic treatment    Acupuncture, massage, deep breathing, meditation, visual imagery, aromatherapy    Use heat or ice at the pain site    Physical therapy     Exercise    Stop smoking    Take part in therapy       Risks and side effects     Talk to your doctor before you start or decide to keep taking opioids. Possible side effects include:      Lowering your breathing rate enough to cause death    Overdose, including death, especially if taking higher than prescribed doses    Worse depression symptoms; less pleasure in things you usually enjoy    Feeling tired or sluggish    Slower thoughts or cloudy thinking    Being more sensitive to pain over time; pain is harder to control    Trouble sleeping or restless sleep    Changes in hormone levels (for example, less testosterone)    Changes in sex drive or ability to have sex    Constipation    Unsafe driving    Itching and sweating    Dizziness    Nausea, throwing up and dry mouth    What else should I know about opioids?    Opioids may lead to dependence, tolerance, or addiction.      Dependence means that if you stop or reduce the medicine too quickly, you will have withdrawal symptoms. These include loose poop (diarrhea), jitters, flu-like symptoms,  nervousness and tremors. Dependence is not the same as addiction.                       Tolerance means needing higher doses over time to get the same effect. This may increase the chance of serious side effects.      Addiction is when people improperly use a substance that harms their body, their mind or their relations with others. Use of opiates can cause a relapse of addiction if you have a history of drug or alcohol abuse.      People who have used opioids for a long time may have a lower quality of life, worse depression, higher levels of pain and more visits to doctors.    You can overdose on opioids. Take these steps to lower your risk of overdose:    1. Recognize the signs:  Signs of overdose include decrease or loss of consciousness (blackout), slowed breathing, trouble waking up and blue lips. If someone is worried about overdose, they should call 911.    2. Talk to your doctor about Narcan (naloxone).   If you are at risk for overdose, you may be given a prescription for Narcan. This medicine very quickly reverses the effects of opioids.   If you overdose, a friend or family member can give you Narcan while waiting for the ambulance. They need to know the signs of overdose and how to give Narcan.     3. Don't use alcohol or street drugs.   Taking them with opioids can cause death.    4. Do not take any of these medicines unless your doctor says it s OK. Taking these with opioids can cause death:    Benzodiazepines, such as lorazepam (Ativan), alprazolam (Xanax) or diazepam (Valium)    Muscle relaxers, such as cyclobenzaprine (Flexeril)    Sleeping pills like zolpidem (Ambien)     Other opioids      How to keep you and other people safe while taking opioids:    1. Never share your opioids with others.  Opioid medicines are regulated by the Drug Enforcement Agency (FERMIN). Selling or sharing medications is a criminal act.    2. Be sure to store opioids in a secure place, locked up if possible. Young children  can easily swallow them and overdose.    3. When you are traveling with your medicines, keep them in the original bottles. If you use a pill box, be sure you also carry a copy of your medicine list from your clinic or pharmacy.    4. Safe disposal of opioids    Most pharmacies have places to get rid of medicine, called disposal kiosks. Medicine disposal options are also available in every [facility]. Search your county and  medication disposal  to find more options. You can find more details at:  [email or web address]-[email or web address]-medications     I agree that my provider, clinic care team, and pharmacy may work with any city, state or federal law enforcement agency that investigates the misuse, sale, or other diversion of my controlled medicine. I will allow my provider to discuss my care with, or share a copy of, this agreement with any other treating provider, pharmacy or emergency room where I receive care.    I have read this agreement and have asked questions about anything I did not understand.    _______________________________________________________  Patient Signature - Omero Mota _____________________                   Date     _______________________________________________________  Provider Signature - Demetris Osman MD   _____________________                   Date     _______________________________________________________  Witness Signature (required if provider not present while patient signing)   _____________________                   Date

## 2023-03-30 NOTE — PROGRESS NOTES
"  Problem List Items Addressed This Visit     Alcohol dependence in remission (H)     No use.  Doing well.          Class 2 severe obesity due to excess calories with serious comorbidity and body mass index (BMI) of 36.0 to 36.9 in adult (H)     The patient declines discussion.  We discussed that he needs laboratory testing before we continue metformin and lisinopril.  We will plan for complete evaluation and med check at next visit.         Drug dependence on maintenance agonist therapy, no symptoms (H)     Stable.  No side effects.  Doing well.  CSA completed today.  F/u in three months.          Relevant Orders    Urine Drugs of Abuse Screen Panel 13 (Completed)    Essential hypertension    Type 2 diabetes mellitus with hyperglycemia, without long-term current use of insulin (H)      Follow-up Visit   Expected date:  Jun 30, 2023 (Approximate)      Follow Up Appointment Details:     Follow-up with whom?: Me    Follow-Up for what?: Adult Preventive    Any Additional Chronic Condition Management?:  General (Other)  Diabetes       Additional Details: suboxone    How?: In Person    How?: In Person    Is this an as-needed follow-up?: No                     Subjective   Omero is a 42 year old who presents for the following health issues   Chief Complaint   Patient presents with     Recheck Medication      Suboxone:  - doing well.   - no side effects   - no use   - he has days when he has cravings.  Occasionally, he will do 2 films. Some days this goes well.  Other days he has cravings.     - sleep has been poor.      History of Present Illness       Reason for visit:  Rx follow up   He is taking medications regularly.       Review of Systems   Constitutional: Negative.    All other systems reviewed and are negative.           Objective    BP (!) 152/88 (BP Location: Left arm, Patient Position: Sitting, Cuff Size: Adult Large)   Pulse 89   Ht 1.676 m (5' 6\")   Wt 100.2 kg (220 lb 14.4 oz)   SpO2 100%   BMI 35.65 " kg/m    Body mass index is 35.65 kg/m .  Physical Exam  Nursing note reviewed.   Constitutional:       General: He is not in acute distress.     Appearance: Normal appearance. He is not ill-appearing.   HENT:      Head: Normocephalic and atraumatic.   Eyes:      Extraocular Movements: Extraocular movements intact.      Conjunctiva/sclera: Conjunctivae normal.   Pulmonary:      Effort: Pulmonary effort is normal.   Neurological:      Mental Status: He is alert and oriented to person, place, and time.   Psychiatric:         Attention and Perception: Attention normal.         Mood and Affect: Mood normal.         Speech: Speech normal.         Thought Content: Thought content normal.             This note has been dictated using voice recognition software. Any grammatical or context distortions are unintentional and inherent to the software

## 2023-03-30 NOTE — ASSESSMENT & PLAN NOTE
No use.  Doing well.    Teach back complete for premedication for contrast allergy.    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

## 2023-03-30 NOTE — ASSESSMENT & PLAN NOTE
The patient declines discussion.  We discussed that he needs laboratory testing before we continue metformin and lisinopril.  We will plan for complete evaluation and med check at next visit.

## 2023-04-15 ENCOUNTER — HEALTH MAINTENANCE LETTER (OUTPATIENT)
Age: 43
End: 2023-04-15

## 2023-04-17 ENCOUNTER — MYC REFILL (OUTPATIENT)
Dept: FAMILY MEDICINE | Facility: CLINIC | Age: 43
End: 2023-04-17
Payer: COMMERCIAL

## 2023-04-17 DIAGNOSIS — F19.11 HISTORY OF DRUG ABUSE IN REMISSION (H): ICD-10-CM

## 2023-04-17 DIAGNOSIS — F19.20 DRUG DEPENDENCE ON MAINTENANCE AGONIST THERAPY, NO SYMPTOMS (H): ICD-10-CM

## 2023-04-18 RX ORDER — BUPRENORPHINE AND NALOXONE 8; 2 MG/1; MG/1
FILM, SOLUBLE BUCCAL; SUBLINGUAL
Qty: 90 EACH | Refills: 0 | Status: SHIPPED | OUTPATIENT
Start: 2023-04-18 | End: 2023-05-17

## 2023-04-21 NOTE — ASSESSMENT & PLAN NOTE
1 year of obstructive symptoms of the lower urinary tract.  No family history of prostate cancer.  No personal history of prostate cancer or PSA testing.  His symptoms correspond roughly to starting Suboxone through a local chemical dependency program approximately 12 months ago.  He is tender to palpation of the prostate and I suspect that he has chronic prostatitis with obstructive symptoms.  We will treat empirically.  Urinalysis was without abnormalities.  If he has improvement, will presume this diagnosis is correct.  If he does not have improvement or has a markedly elevated PSA will consider referral to urology.  Patient will follow-up in 4-5 weeks to discuss symptoms.  He does not believe he is been at risk for gonorrhea and chlamydia.   50 feet

## 2023-04-29 DIAGNOSIS — E11.65 TYPE 2 DIABETES MELLITUS WITH HYPERGLYCEMIA, WITHOUT LONG-TERM CURRENT USE OF INSULIN (H): ICD-10-CM

## 2023-04-29 DIAGNOSIS — E78.00 HYPERCHOLESTEROLEMIA: ICD-10-CM

## 2023-04-29 NOTE — TELEPHONE ENCOUNTER
"Routing refill request to provider for review/approval because:  Labs not current:  Last LDL was 6/17/2019    Last Written Prescription Date:  11/7/2022  Last Fill Quantity: 30,  # refills: 2   Last office visit provider:  3/30/2023     Requested Prescriptions   Pending Prescriptions Disp Refills     pravastatin (PRAVACHOL) 20 MG tablet [Pharmacy Med Name: PRAVASTATIN SODIUM 20 MG TAB] 30 tablet 2     Sig: TAKE 1 TABLET BY MOUTH EVERY DAY IN THE EVENING       Statins Protocol Failed - 4/29/2023  2:54 PM        Failed - LDL on file in past 12 months     Recent Labs   Lab Test 06/17/19  0930   LDL 97             Passed - No abnormal creatine kinase in past 12 months     No lab results found.             Passed - Recent (12 mo) or future (30 days) visit within the authorizing provider's specialty     Patient has had an office visit with the authorizing provider or a provider within the authorizing providers department within the previous 12 mos or has a future within next 30 days. See \"Patient Info\" tab in inbasket, or \"Choose Columns\" in Meds & Orders section of the refill encounter.              Passed - Medication is active on med list        Passed - Patient is age 18 or older             Tabatha Wolf RN 04/29/23 2:55 PM  "

## 2023-05-01 RX ORDER — PRAVASTATIN SODIUM 20 MG
TABLET ORAL
Qty: 30 TABLET | Refills: 2 | Status: SHIPPED | OUTPATIENT
Start: 2023-05-01 | End: 2023-08-15

## 2023-05-17 ENCOUNTER — MYC REFILL (OUTPATIENT)
Dept: FAMILY MEDICINE | Facility: CLINIC | Age: 43
End: 2023-05-17
Payer: COMMERCIAL

## 2023-05-17 DIAGNOSIS — F19.20 DRUG DEPENDENCE ON MAINTENANCE AGONIST THERAPY, NO SYMPTOMS (H): ICD-10-CM

## 2023-05-17 DIAGNOSIS — F19.11 HISTORY OF DRUG ABUSE IN REMISSION (H): ICD-10-CM

## 2023-05-19 RX ORDER — BUPRENORPHINE AND NALOXONE 8; 2 MG/1; MG/1
FILM, SOLUBLE BUCCAL; SUBLINGUAL
Qty: 90 EACH | Refills: 0 | Status: SHIPPED | OUTPATIENT
Start: 2023-05-19 | End: 2023-06-16

## 2023-06-16 ENCOUNTER — MYC REFILL (OUTPATIENT)
Dept: FAMILY MEDICINE | Facility: CLINIC | Age: 43
End: 2023-06-16
Payer: COMMERCIAL

## 2023-06-16 DIAGNOSIS — F19.11 HISTORY OF DRUG ABUSE IN REMISSION (H): ICD-10-CM

## 2023-06-16 DIAGNOSIS — F19.20 DRUG DEPENDENCE ON MAINTENANCE AGONIST THERAPY, NO SYMPTOMS (H): ICD-10-CM

## 2023-06-16 RX ORDER — BUPRENORPHINE AND NALOXONE 8; 2 MG/1; MG/1
FILM, SOLUBLE BUCCAL; SUBLINGUAL
Qty: 90 EACH | Refills: 0 | Status: SHIPPED | OUTPATIENT
Start: 2023-06-16 | End: 2023-07-14

## 2023-07-14 ENCOUNTER — MYC REFILL (OUTPATIENT)
Dept: FAMILY MEDICINE | Facility: CLINIC | Age: 43
End: 2023-07-14
Payer: COMMERCIAL

## 2023-07-14 DIAGNOSIS — F19.11 HISTORY OF DRUG ABUSE IN REMISSION (H): ICD-10-CM

## 2023-07-14 DIAGNOSIS — F19.20 DRUG DEPENDENCE ON MAINTENANCE AGONIST THERAPY, NO SYMPTOMS (H): ICD-10-CM

## 2023-07-14 RX ORDER — BUPRENORPHINE AND NALOXONE 8; 2 MG/1; MG/1
FILM, SOLUBLE BUCCAL; SUBLINGUAL
Qty: 90 EACH | Refills: 0 | Status: SHIPPED | OUTPATIENT
Start: 2023-07-14 | End: 2023-08-10

## 2023-07-14 NOTE — TELEPHONE ENCOUNTER
Routing refill request to provider for review/approval because:  Drug not on the [facility] refill protocol     Last Written Prescription Date: 6-16-23  Last Fill Quantity: 90,  # refills: 0   Last office visit provider: 3-30-23     Requested Prescriptions   Pending Prescriptions Disp Refills     buprenorphine HCl-naloxone HCl (SUBOXONE) 8-2 MG per film 90 each 0     Sig: Place 1.5 film under tongue 2 (two) times per day.       There is no refill protocol information for this order          Ambika Mendoza RN 07/14/23 3:42 PM

## 2023-08-10 ENCOUNTER — MYC REFILL (OUTPATIENT)
Dept: FAMILY MEDICINE | Facility: CLINIC | Age: 43
End: 2023-08-10
Payer: COMMERCIAL

## 2023-08-10 DIAGNOSIS — F19.11 HISTORY OF DRUG ABUSE IN REMISSION (H): ICD-10-CM

## 2023-08-10 DIAGNOSIS — F19.20 DRUG DEPENDENCE ON MAINTENANCE AGONIST THERAPY, NO SYMPTOMS (H): ICD-10-CM

## 2023-08-11 RX ORDER — BUPRENORPHINE AND NALOXONE 8; 2 MG/1; MG/1
FILM, SOLUBLE BUCCAL; SUBLINGUAL
Qty: 90 EACH | Refills: 0 | Status: SHIPPED | OUTPATIENT
Start: 2023-08-11 | End: 2023-09-08

## 2023-08-15 DIAGNOSIS — E78.00 HYPERCHOLESTEROLEMIA: ICD-10-CM

## 2023-08-15 DIAGNOSIS — E11.65 TYPE 2 DIABETES MELLITUS WITH HYPERGLYCEMIA, WITHOUT LONG-TERM CURRENT USE OF INSULIN (H): ICD-10-CM

## 2023-08-15 RX ORDER — PRAVASTATIN SODIUM 20 MG
TABLET ORAL
Qty: 30 TABLET | Refills: 2 | Status: SHIPPED | OUTPATIENT
Start: 2023-08-15 | End: 2023-12-04

## 2023-08-15 NOTE — TELEPHONE ENCOUNTER
"Routing refill request to provider for review/approval because:  Labs not current:  LDL  6/17/2019      Last Written Prescription Date:  5/1/2023  Last Fill Quantity: 30,  # refills: 2   Last office visit provider:  3/30/2023     Requested Prescriptions   Pending Prescriptions Disp Refills    pravastatin (PRAVACHOL) 20 MG tablet [Pharmacy Med Name: PRAVASTATIN SODIUM 20 MG TAB] 30 tablet 2     Sig: TAKE 1 TABLET BY MOUTH EVERY DAY IN THE EVENING       Statins Protocol Failed - 8/15/2023 12:39 AM        Failed - LDL on file in past 12 months     Recent Labs   Lab Test 06/17/19  0930   LDL 97             Passed - No abnormal creatine kinase in past 12 months     No lab results found.             Passed - Recent (12 mo) or future (30 days) visit within the authorizing provider's specialty     Patient has had an office visit with the authorizing provider or a provider within the authorizing providers department within the previous 12 mos or has a future within next 30 days. See \"Patient Info\" tab in inbasket, or \"Choose Columns\" in Meds & Orders section of the refill encounter.              Passed - Medication is active on med list        Passed - Patient is age 18 or older             Meseret Frias RN 08/15/23 10:06 AM  "

## 2023-09-08 ENCOUNTER — MYC REFILL (OUTPATIENT)
Dept: FAMILY MEDICINE | Facility: CLINIC | Age: 43
End: 2023-09-08
Payer: COMMERCIAL

## 2023-09-08 DIAGNOSIS — F19.11 HISTORY OF DRUG ABUSE IN REMISSION (H): ICD-10-CM

## 2023-09-08 DIAGNOSIS — F19.20 DRUG DEPENDENCE ON MAINTENANCE AGONIST THERAPY, NO SYMPTOMS (H): ICD-10-CM

## 2023-09-08 RX ORDER — BUPRENORPHINE AND NALOXONE 8; 2 MG/1; MG/1
FILM, SOLUBLE BUCCAL; SUBLINGUAL
Qty: 90 EACH | Refills: 0 | Status: SHIPPED | OUTPATIENT
Start: 2023-09-08 | End: 2023-10-06

## 2023-09-08 NOTE — TELEPHONE ENCOUNTER
Routing refill request to provider for review/approval because:  Drug not on the [facility] refill protocol     Last Written Prescription Date:  8/11/2023  Last Fill Quantity: 90,  # refills: 0   Last office visit provider:  3/30/2023     Requested Prescriptions   Pending Prescriptions Disp Refills    buprenorphine HCl-naloxone HCl (SUBOXONE) 8-2 MG per film 90 each 0     Sig: Place 1.5 film under tongue 2 (two) times per day.       There is no refill protocol information for this order          Meseret Frias RN 09/08/23 12:08 PM

## 2023-09-10 ENCOUNTER — HEALTH MAINTENANCE LETTER (OUTPATIENT)
Age: 43
End: 2023-09-10

## 2023-09-12 ENCOUNTER — OFFICE VISIT (OUTPATIENT)
Dept: FAMILY MEDICINE | Facility: CLINIC | Age: 43
End: 2023-09-12
Attending: FAMILY MEDICINE
Payer: COMMERCIAL

## 2023-09-12 VITALS
BODY MASS INDEX: 34.02 KG/M2 | WEIGHT: 211.7 LBS | OXYGEN SATURATION: 98 % | RESPIRATION RATE: 16 BRPM | HEIGHT: 66 IN | HEART RATE: 62 BPM | SYSTOLIC BLOOD PRESSURE: 153 MMHG | DIASTOLIC BLOOD PRESSURE: 86 MMHG

## 2023-09-12 DIAGNOSIS — F19.20 DRUG DEPENDENCE ON MAINTENANCE AGONIST THERAPY, NO SYMPTOMS (H): ICD-10-CM

## 2023-09-12 DIAGNOSIS — E78.00 HYPERCHOLESTEROLEMIA: ICD-10-CM

## 2023-09-12 DIAGNOSIS — E88.810 METABOLIC SYNDROME: ICD-10-CM

## 2023-09-12 DIAGNOSIS — I10 ESSENTIAL HYPERTENSION: ICD-10-CM

## 2023-09-12 DIAGNOSIS — Z11.59 NEED FOR HEPATITIS C SCREENING TEST: ICD-10-CM

## 2023-09-12 DIAGNOSIS — Z00.00 ENCOUNTER FOR ROUTINE ADULT HEALTH EXAMINATION WITHOUT ABNORMAL FINDINGS: Primary | ICD-10-CM

## 2023-09-12 DIAGNOSIS — E66.811 CLASS 1 OBESITY DUE TO EXCESS CALORIES WITH SERIOUS COMORBIDITY AND BODY MASS INDEX (BMI) OF 34.0 TO 34.9 IN ADULT: ICD-10-CM

## 2023-09-12 DIAGNOSIS — Z12.11 SCREEN FOR COLON CANCER: ICD-10-CM

## 2023-09-12 DIAGNOSIS — Z11.4 SCREENING FOR HIV (HUMAN IMMUNODEFICIENCY VIRUS): ICD-10-CM

## 2023-09-12 DIAGNOSIS — E66.09 CLASS 1 OBESITY DUE TO EXCESS CALORIES WITH SERIOUS COMORBIDITY AND BODY MASS INDEX (BMI) OF 34.0 TO 34.9 IN ADULT: ICD-10-CM

## 2023-09-12 DIAGNOSIS — E11.65 TYPE 2 DIABETES MELLITUS WITH HYPERGLYCEMIA, WITHOUT LONG-TERM CURRENT USE OF INSULIN (H): ICD-10-CM

## 2023-09-12 LAB
HBA1C MFR BLD: 12.3 % (ref 0–5.6)
HOLD SPECIMEN: NORMAL
HOLD SPECIMEN: NORMAL

## 2023-09-12 PROCEDURE — 86803 HEPATITIS C AB TEST: CPT | Performed by: FAMILY MEDICINE

## 2023-09-12 PROCEDURE — 36415 COLL VENOUS BLD VENIPUNCTURE: CPT | Performed by: FAMILY MEDICINE

## 2023-09-12 PROCEDURE — 83036 HEMOGLOBIN GLYCOSYLATED A1C: CPT | Performed by: FAMILY MEDICINE

## 2023-09-12 PROCEDURE — 99396 PREV VISIT EST AGE 40-64: CPT | Performed by: FAMILY MEDICINE

## 2023-09-12 PROCEDURE — 80048 BASIC METABOLIC PNL TOTAL CA: CPT | Performed by: FAMILY MEDICINE

## 2023-09-12 PROCEDURE — 80061 LIPID PANEL: CPT | Performed by: FAMILY MEDICINE

## 2023-09-12 PROCEDURE — 99214 OFFICE O/P EST MOD 30 MIN: CPT | Mod: 25 | Performed by: FAMILY MEDICINE

## 2023-09-12 PROCEDURE — 83721 ASSAY OF BLOOD LIPOPROTEIN: CPT | Performed by: FAMILY MEDICINE

## 2023-09-12 PROCEDURE — 84460 ALANINE AMINO (ALT) (SGPT): CPT | Performed by: FAMILY MEDICINE

## 2023-09-12 RX ORDER — LANCETS
EACH MISCELLANEOUS
Qty: 100 EACH | Refills: 6 | Status: SHIPPED | OUTPATIENT
Start: 2023-09-12 | End: 2023-09-20

## 2023-09-12 RX ORDER — METFORMIN HCL 500 MG
1000 TABLET, EXTENDED RELEASE 24 HR ORAL
Qty: 180 TABLET | Refills: 1 | Status: SHIPPED | OUTPATIENT
Start: 2023-09-12 | End: 2024-03-27

## 2023-09-12 ASSESSMENT — ENCOUNTER SYMPTOMS
CHILLS: 0
PALPITATIONS: 0
NERVOUS/ANXIOUS: 0
WEAKNESS: 0
DIARRHEA: 0
FREQUENCY: 0
HEARTBURN: 0
HEMATURIA: 0
DIZZINESS: 0
HEMATOCHEZIA: 0
CONSTIPATION: 0
ARTHRALGIAS: 0
COUGH: 0
MYALGIAS: 0
ABDOMINAL PAIN: 0
SORE THROAT: 0
DYSURIA: 0
SHORTNESS OF BREATH: 0
FEVER: 0
NAUSEA: 0
HEADACHES: 0
PARESTHESIAS: 0
JOINT SWELLING: 0
EYE PAIN: 0

## 2023-09-12 NOTE — PATIENT INSTRUCTIONS
100+ grams of protein per day  800 grams of veggies/fruit day      Book on [employer] - Metabolical - [place]

## 2023-09-12 NOTE — PROGRESS NOTES
"  {PROVIDER CHARTING PREFERENCE:531081}    Violet Jamil is a 43 year old, presenting for the following health issues:  Physical      9/12/2023    10:59 AM   Additional Questions   Roomed by Willie Domingo MA   Accompanied by Self         9/12/2023    10:59 AM   Patient Reported Additional Medications   Patient reports taking the following new medications None       Healthy Habits:     Getting at least 3 servings of Calcium per day:  Yes    Bi-annual eye exam:  Yes    Dental care twice a year:  NO    Sleep apnea or symptoms of sleep apnea:  None    Diet:  Regular (no restrictions)    Frequency of exercise:  None    Taking medications regularly:  Yes    Medication side effects:  None    Additional concerns today:  No       {SUPERLIST (Optional):764528}  {additonal problems for provider to add (Optional):586659}      Review of Systems   Constitutional:  Negative for chills and fever.   HENT:  Negative for congestion, ear pain, hearing loss and sore throat.    Eyes:  Negative for pain and visual disturbance.   Respiratory:  Negative for cough and shortness of breath.    Cardiovascular:  Negative for chest pain, palpitations and peripheral edema.   Gastrointestinal:  Negative for abdominal pain, constipation, diarrhea, heartburn, hematochezia and nausea.   Genitourinary:  Negative for dysuria, frequency, genital sores, hematuria, impotence, penile discharge and urgency.   Musculoskeletal:  Negative for arthralgias, joint swelling and myalgias.   Skin:  Negative for rash.   Neurological:  Negative for dizziness, weakness, headaches and paresthesias.   Psychiatric/Behavioral:  Negative for mood changes. The patient is not nervous/anxious.       {ROS COMP (Optional):778837}      Objective    BP (!) 153/86 (BP Location: Left arm, Patient Position: Sitting, Cuff Size: Adult Large)   Pulse 62   Resp 16   Ht 1.676 m (5' 6\")   Wt 96 kg (211 lb 11.2 oz)   SpO2 98%   BMI 34.17 kg/m    Body mass index is 34.17 " kg/m .  Physical Exam   {Exam List (Optional):938178}    {Diagnostic Test Results (Optional):609184}    {AMBULATORY ATTESTATION (Optional):546326}

## 2023-09-12 NOTE — ASSESSMENT & PLAN NOTE
The patient has realized weight loss.  This is likely due to uncontrolled diabetes.  He is a candidate for medical weight loss based on BMI and comorbidity (diabetes, hypertension, metabolic syndrome).  Starting weight for semaglutide will be 211 pounds.  I anticipate this will get covered by his insurance as it was covered in the past.

## 2023-09-12 NOTE — PROGRESS NOTES
"SUBJECTIVE:   CC: Omero is an 43 year old who presents for preventative health visit.       9/12/2023    10:59 AM   Additional Questions   Roomed by Willie Domingo MA   Accompanied by Self         9/12/2023    10:59 AM   Patient Reported Additional Medications   Patient reports taking the following new medications None     DM / HTN:   - surprised that A1c is terrible.    - diet has been terrible. \"Drinking lots of sugar.\" Mountain Dew.    - he had an episode of what sounds like low blood sugar.       MAT:   - he continues to find it helpful   - he takes it regularly and does not miss doses   - no alcohol. No drugs.   - rare cravings.     Colon cancer screening: undecided.     Healthy Habits:     Getting at least 3 servings of Calcium per day:  Yes    Bi-annual eye exam:  Yes    Dental care twice a year:  NO    Sleep apnea or symptoms of sleep apnea:  None    Diet:  Regular (no restrictions)    Frequency of exercise:  None    Taking medications regularly:  Yes    Medication side effects:  None    Additional concerns today:  No    Social History     Tobacco Use    Smoking status: Former     Packs/day: 1.00     Types: Cigarettes    Smokeless tobacco: Current     Types: Chew   Substance Use Topics    Alcohol use: Not Currently             9/12/2023    10:53 AM   Alcohol Use   Prescreen: >3 drinks/day or >7 drinks/week? Not Applicable     Last PSA:   Prostate Specific Antigen Screen   Date Value Ref Range Status   10/05/2020 0.2 0.0 - 2.5 ng/mL Final       Reviewed orders with patient. Reviewed health maintenance and updated orders accordingly - Yes    Reviewed and updated as needed this visit by clinical staff   Tobacco  Allergies  Meds  Problems  Med Hx  Surg Hx  Fam Hx          Reviewed and updated as needed this visit by Provider   Tobacco  Allergies  Meds  Problems  Med Hx  Surg Hx  Fam Hx           Review of Systems   Constitutional:  Negative for chills and fever.   HENT:  Negative for congestion, ear " "pain, hearing loss and sore throat.    Eyes:  Negative for pain and visual disturbance.   Respiratory:  Negative for cough and shortness of breath.    Cardiovascular:  Negative for chest pain, palpitations and peripheral edema.   Gastrointestinal:  Negative for abdominal pain, constipation, diarrhea, heartburn, hematochezia and nausea.   Genitourinary:  Negative for dysuria, frequency, genital sores, hematuria, impotence, penile discharge and urgency.   Musculoskeletal:  Negative for arthralgias, joint swelling and myalgias.   Skin:  Negative for rash.   Neurological:  Negative for dizziness, weakness, headaches and paresthesias.   Psychiatric/Behavioral:  Negative for mood changes. The patient is not nervous/anxious.      OBJECTIVE:   BP (!) 153/86 (BP Location: Left arm, Patient Position: Sitting, Cuff Size: Adult Large)   Pulse 62   Resp 16   Ht 1.676 m (5' 6\")   Wt 96 kg (211 lb 11.2 oz)   SpO2 98%   BMI 34.17 kg/m      Physical Exam  Vitals reviewed.   Constitutional:       General: He is not in acute distress.     Appearance: Normal appearance. He is not ill-appearing.   HENT:      Head: Normocephalic and atraumatic.      Right Ear: External ear normal.      Left Ear: External ear normal.      Nose: Nose normal.      Mouth/Throat:      Pharynx: Oropharynx is clear. No oropharyngeal exudate or posterior oropharyngeal erythema.   Eyes:      General: No scleral icterus.        Right eye: No discharge.         Left eye: No discharge.      Extraocular Movements: Extraocular movements intact.      Conjunctiva/sclera: Conjunctivae normal.      Pupils: Pupils are equal, round, and reactive to light.   Neck:      Comments: No thyromegaly.  Cardiovascular:      Rate and Rhythm: Normal rate and regular rhythm.      Heart sounds: Normal heart sounds. No murmur heard.     No friction rub. No gallop.   Pulmonary:      Effort: Pulmonary effort is normal. No respiratory distress.      Breath sounds: Normal breath " sounds. No wheezing or rales.   Abdominal:      General: There is no distension.      Palpations: Abdomen is soft. There is no mass.      Tenderness: There is no abdominal tenderness.   Musculoskeletal:         General: No signs of injury. Normal range of motion.      Cervical back: Normal range of motion.      Right lower leg: No edema.      Left lower leg: No edema.   Lymphadenopathy:      Cervical: No cervical adenopathy.   Skin:     General: Skin is warm.      Coloration: Skin is not jaundiced.      Findings: No rash.   Neurological:      General: No focal deficit present.      Mental Status: He is alert and oriented to person, place, and time.      Cranial Nerves: No cranial nerve deficit.      Deep Tendon Reflexes: Reflexes normal.   Psychiatric:         Mood and Affect: Mood normal.           ASSESSMENT/PLAN:     Problem List Items Addressed This Visit       Class 1 obesity due to excess calories with serious comorbidity and body mass index (BMI) of 34.0 to 34.9 in adult     The patient has realized weight loss.  This is likely due to uncontrolled diabetes.  He is a candidate for medical weight loss based on BMI and comorbidity (diabetes, hypertension, metabolic syndrome).  Starting weight for semaglutide will be 211 pounds.  I anticipate this will get covered by his insurance as it was covered in the past.         Relevant Medications    metFORMIN (GLUCOPHAGE XR) 500 MG 24 hr tablet    Semaglutide-Weight Management (WEGOVY) 0.25 MG/0.5ML pen    Drug dependence on maintenance agonist therapy, no symptoms (H)     Overall doing well.  I suspect that he has transferred some of his addictive qualities from alcohol and prescription pills to sugar.  I anticipate this will improve with a GLP-1 receptor agonist.  There is no evidence of misuse or diversion of Suboxone.  For now, continue current dosing.  At some point consider a thoughtful and lengthy taper.  Given changes on his diabetes plan will defer any changes  on his maintenance Therapy for Now.  Up in 3 Months.         Essential hypertension    Relevant Orders    Basic metabolic panel  (Ca, Cl, CO2, Creat, Gluc, K, Na, BUN)    ALT    Hypercholesterolemia    Metabolic syndrome    Relevant Orders    Lipid panel reflex to direct LDL Fasting    Type 2 diabetes mellitus with hyperglycemia, without long-term current use of insulin (H)     The patient has been experiencing polyuria and polydipsia.  Nocturia as well.  Hemoglobin A1c is severely elevated.  We do lengthy discussion regarding diabetes, hyperglycemia and strategies to improve.  Currently, he is consuming 6 to 12 cans of Mountain Dew per day.  Much of his nutrition is from fast food restaurants.  He is interested in behavioral change.  - Continue aspirin.  Continue statin.  - Blood pressure elevated today.  We will focus on dietary changes.  - Continue metformin.  - Add semaglutide.  Initial impressions of semaglutide in 2021 included a concern for constipation.  I have suggested that he combined behavioral change (eat better food and food with more fiber as well as bulk supplementation) with a GLP-1 receptor agonist.  Wegovy was prescribed.  He understands that it is currently not available in the pharmacies and is willing to wait until the supply is read launched later this fall.  - I like to see him back in clinic in approximately 6 weeks to review progress.  - I prescribed a glucometer.  I also prescribed a continuous glucose monitor.  -Lastly, we did discuss that his blood sugar is elevated enough to justify prescription for long-acting insulin.  We will defer for now but this should be considered if his blood sugar does not improve with her next check.         Relevant Medications    metFORMIN (GLUCOPHAGE XR) 500 MG 24 hr tablet    blood glucose monitoring (NO BRAND SPECIFIED) meter device kit    thin (NO BRAND SPECIFIED) lancets    blood glucose (NO BRAND SPECIFIED) test strip    Continuous Blood Gluc Sensor  "(FREESTYLE NOBLE 2 SENSOR) [facility]    Semaglutide-Weight Management (WEGOVY) 0.25 MG/0.5ML pen    Other Relevant Orders    Adult Eye  Referral    HEMOGLOBIN A1C (Completed)    Extra Urine (LAB USE ONLY) (Completed)     Other Visit Diagnoses       Encounter for routine adult health examination without abnormal findings    -  Primary    Screen for colon cancer        Screening for HIV (human immunodeficiency virus)        Need for hepatitis C screening test        Relevant Orders    Hepatitis C Screen Reflex to HCV RNA Quant and Genotype            Patient has been advised of split billing requirements and indicates understanding: Yes      COUNSELING:   Reviewed preventive health counseling, as reflected in patient instructions       Regular exercise       Healthy diet/nutrition      BMI:   Estimated body mass index is 34.17 kg/m  as calculated from the following:    Height as of this encounter: 1.676 m (5' 6\").    Weight as of this encounter: 96 kg (211 lb 11.2 oz).   Weight management plan: Patient was referred to their PCP to discuss a diet and exercise plan.      He reports that he has quit smoking. His smoking use included cigarettes. He smoked an average of 1 pack per day. His smokeless tobacco use includes chew.            Demetris Osman MD  [facility]  "

## 2023-09-12 NOTE — ASSESSMENT & PLAN NOTE
Overall doing well.  I suspect that he has transferred some of his addictive qualities from alcohol and prescription pills to sugar.  I anticipate this will improve with a GLP-1 receptor agonist.  There is no evidence of misuse or diversion of Suboxone.  For now, continue current dosing.  At some point consider a thoughtful and lengthy taper.  Given changes on his diabetes plan will defer any changes on his maintenance Therapy for Now.  Up in 3 Months.

## 2023-09-12 NOTE — ASSESSMENT & PLAN NOTE
The patient has been experiencing polyuria and polydipsia.  Nocturia as well.  Hemoglobin A1c is severely elevated.  We do lengthy discussion regarding diabetes, hyperglycemia and strategies to improve.  Currently, he is consuming 6 to 12 cans of Mountain Dew per day.  Much of his nutrition is from fast food restaurants.  He is interested in behavioral change.  - Continue aspirin.  Continue statin.  - Blood pressure elevated today.  We will focus on dietary changes.  - Continue metformin.  - Add semaglutide.  Initial impressions of semaglutide in 2021 included a concern for constipation.  I have suggested that he combined behavioral change (eat better food and food with more fiber as well as bulk supplementation) with a GLP-1 receptor agonist.  Wegovy was prescribed.  He understands that it is currently not available in the pharmacies and is willing to wait until the supply is read launched later this fall.  - I like to see him back in clinic in approximately 6 weeks to review progress.  - I prescribed a glucometer.  I also prescribed a continuous glucose monitor.  -Lastly, we did discuss that his blood sugar is elevated enough to justify prescription for long-acting insulin.  We will defer for now but this should be considered if his blood sugar does not improve with her next check.

## 2023-09-13 LAB
ALT SERPL W P-5'-P-CCNC: 27 U/L (ref 0–70)
ANION GAP SERPL CALCULATED.3IONS-SCNC: 14 MMOL/L (ref 7–15)
BUN SERPL-MCNC: 8.4 MG/DL (ref 6–20)
CALCIUM SERPL-MCNC: 9.6 MG/DL (ref 8.6–10)
CHLORIDE SERPL-SCNC: 95 MMOL/L (ref 98–107)
CHOLEST SERPL-MCNC: 162 MG/DL
CREAT SERPL-MCNC: 0.69 MG/DL (ref 0.67–1.17)
DEPRECATED HCO3 PLAS-SCNC: 26 MMOL/L (ref 22–29)
EGFRCR SERPLBLD CKD-EPI 2021: >90 ML/MIN/1.73M2
GLUCOSE SERPL-MCNC: 423 MG/DL (ref 70–99)
HCV AB SERPL QL IA: NONREACTIVE
HDLC SERPL-MCNC: 35 MG/DL
LDLC SERPL CALC-MCNC: ABNORMAL MG/DL
LDLC SERPL DIRECT ASSAY-MCNC: 69 MG/DL
NONHDLC SERPL-MCNC: 127 MG/DL
POTASSIUM SERPL-SCNC: 3.9 MMOL/L (ref 3.4–5.3)
SODIUM SERPL-SCNC: 135 MMOL/L (ref 136–145)
TRIGL SERPL-MCNC: 558 MG/DL

## 2023-09-20 ENCOUNTER — TELEPHONE (OUTPATIENT)
Dept: FAMILY MEDICINE | Facility: CLINIC | Age: 43
End: 2023-09-20
Payer: COMMERCIAL

## 2023-09-20 DIAGNOSIS — E11.65 TYPE 2 DIABETES MELLITUS WITH HYPERGLYCEMIA, WITHOUT LONG-TERM CURRENT USE OF INSULIN (H): ICD-10-CM

## 2023-09-20 RX ORDER — LANCETS
EACH MISCELLANEOUS
Qty: 100 EACH | Refills: 6 | Status: SHIPPED | OUTPATIENT
Start: 2023-09-20

## 2023-09-20 NOTE — TELEPHONE ENCOUNTER
General Call    Contacts         Type Contact Phone/Fax    09/20/2023 10:13 AM CDT Phone (Incoming) [id number] [address] (Pharmacy) [phone]          Reason for Call:  Prescription Clarification    What are your questions or concerns:  Pharmacy requesting clarification for:    thin (NO BRAND SPECIFIED) lancets   Sig: Use with lanceting device. To accompany: Blood Glucose Monitor Brands: per insurance.     Frequency is required on prescription.

## 2023-09-27 NOTE — ADDENDUM NOTE
Addendum Note by Demetris Sotelo MD at 11/12/2019 10:49 AM     Author: Demetris Sotelo MD Service: -- Author Type: Physician    Filed: 11/12/2019 10:49 AM Encounter Date: 11/7/2019 Status: Signed    : Demetris Sotelo MD (Physician)    Addended by: DEMETRIS SOTELO on: 11/12/2019 10:49 AM        Modules accepted: Orders        
Addendum Note by Demetris Sotelo MD at 11/12/2019 11:48 AM     Author: Demetris Sotelo MD Service: -- Author Type: Physician    Filed: 11/12/2019 11:48 AM Encounter Date: 11/7/2019 Status: Signed    : Demetris Sotelo MD (Physician)    Addended by: DEMETRIS SOTELO on: 11/12/2019 11:48 AM        Modules accepted: Orders        
Detail Level: Zone
Price (Use Numbers Only, No Special Characters Or $): 50

## 2023-09-29 ENCOUNTER — TELEPHONE (OUTPATIENT)
Dept: FAMILY MEDICINE | Facility: CLINIC | Age: 43
End: 2023-09-29
Payer: COMMERCIAL

## 2023-09-29 NOTE — TELEPHONE ENCOUNTER
Patient Quality Outreach    Patient is due for the following:   Diabetes -  Eye Exam and Foot Exam  Hypertension -  BP check  Colon Cancer Screening    Next Steps:   Patient declined follow up at this time.    Type of outreach:    Phone, spoke to patient/parent. No BP check at home. Pt declined Nurse visit for BP check at this time and will schedule follow-up visit with Dr. Patino in the next couple of weeks. xl      Questions for provider review:    None           Rama Mason MA

## 2023-10-06 ENCOUNTER — MYC REFILL (OUTPATIENT)
Dept: FAMILY MEDICINE | Facility: CLINIC | Age: 43
End: 2023-10-06
Payer: COMMERCIAL

## 2023-10-06 DIAGNOSIS — F19.20 DRUG DEPENDENCE ON MAINTENANCE AGONIST THERAPY, NO SYMPTOMS (H): ICD-10-CM

## 2023-10-06 DIAGNOSIS — F19.11 HISTORY OF DRUG ABUSE IN REMISSION (H): ICD-10-CM

## 2023-10-06 RX ORDER — BUPRENORPHINE AND NALOXONE 8; 2 MG/1; MG/1
FILM, SOLUBLE BUCCAL; SUBLINGUAL
Qty: 90 EACH | Refills: 0 | Status: SHIPPED | OUTPATIENT
Start: 2023-10-06 | End: 2023-11-03

## 2023-10-20 ENCOUNTER — TRANSFERRED RECORDS (OUTPATIENT)
Dept: HEALTH INFORMATION MANAGEMENT | Facility: CLINIC | Age: 43
End: 2023-10-20
Payer: COMMERCIAL

## 2023-11-03 ENCOUNTER — MYC REFILL (OUTPATIENT)
Dept: FAMILY MEDICINE | Facility: CLINIC | Age: 43
End: 2023-11-03
Payer: COMMERCIAL

## 2023-11-03 DIAGNOSIS — F19.11 HISTORY OF DRUG ABUSE IN REMISSION (H): ICD-10-CM

## 2023-11-03 DIAGNOSIS — F19.20 DRUG DEPENDENCE ON MAINTENANCE AGONIST THERAPY, NO SYMPTOMS (H): ICD-10-CM

## 2023-11-06 RX ORDER — BUPRENORPHINE AND NALOXONE 8; 2 MG/1; MG/1
FILM, SOLUBLE BUCCAL; SUBLINGUAL
Qty: 90 EACH | Refills: 0 | Status: SHIPPED | OUTPATIENT
Start: 2023-11-06 | End: 2023-12-04

## 2023-12-02 DIAGNOSIS — E78.00 HYPERCHOLESTEROLEMIA: ICD-10-CM

## 2023-12-02 DIAGNOSIS — E11.65 TYPE 2 DIABETES MELLITUS WITH HYPERGLYCEMIA, WITHOUT LONG-TERM CURRENT USE OF INSULIN (H): ICD-10-CM

## 2023-12-03 DIAGNOSIS — I10 ESSENTIAL (PRIMARY) HYPERTENSION: ICD-10-CM

## 2023-12-04 ENCOUNTER — MYC REFILL (OUTPATIENT)
Dept: FAMILY MEDICINE | Facility: CLINIC | Age: 43
End: 2023-12-04
Payer: COMMERCIAL

## 2023-12-04 ENCOUNTER — TELEPHONE (OUTPATIENT)
Dept: FAMILY MEDICINE | Facility: CLINIC | Age: 43
End: 2023-12-04
Payer: COMMERCIAL

## 2023-12-04 DIAGNOSIS — F19.20 DRUG DEPENDENCE ON MAINTENANCE AGONIST THERAPY, NO SYMPTOMS (H): ICD-10-CM

## 2023-12-04 DIAGNOSIS — F19.11 HISTORY OF DRUG ABUSE IN REMISSION (H): ICD-10-CM

## 2023-12-04 RX ORDER — LISINOPRIL 20 MG/1
TABLET ORAL
Qty: 30 TABLET | Refills: 11 | Status: SHIPPED | OUTPATIENT
Start: 2023-12-04 | End: 2024-06-25

## 2023-12-04 RX ORDER — BUPRENORPHINE AND NALOXONE 8; 2 MG/1; MG/1
FILM, SOLUBLE BUCCAL; SUBLINGUAL
Qty: 90 EACH | Refills: 0 | Status: SHIPPED | OUTPATIENT
Start: 2023-12-04 | End: 2024-01-02

## 2023-12-04 RX ORDER — PRAVASTATIN SODIUM 20 MG
TABLET ORAL
Qty: 90 TABLET | Refills: 2 | Status: SHIPPED | OUTPATIENT
Start: 2023-12-04 | End: 2024-09-10

## 2023-12-11 ENCOUNTER — LAB (OUTPATIENT)
Dept: FAMILY MEDICINE | Facility: CLINIC | Age: 43
End: 2023-12-11

## 2023-12-11 ENCOUNTER — OFFICE VISIT (OUTPATIENT)
Dept: FAMILY MEDICINE | Facility: CLINIC | Age: 43
End: 2023-12-11
Attending: FAMILY MEDICINE
Payer: COMMERCIAL

## 2023-12-11 VITALS
SYSTOLIC BLOOD PRESSURE: 137 MMHG | OXYGEN SATURATION: 98 % | TEMPERATURE: 97.8 F | HEIGHT: 66 IN | DIASTOLIC BLOOD PRESSURE: 80 MMHG | HEART RATE: 82 BPM | BODY MASS INDEX: 34.99 KG/M2 | WEIGHT: 217.7 LBS | RESPIRATION RATE: 16 BRPM

## 2023-12-11 DIAGNOSIS — E78.00 HYPERCHOLESTEROLEMIA: ICD-10-CM

## 2023-12-11 DIAGNOSIS — E66.01 CLASS 2 SEVERE OBESITY DUE TO EXCESS CALORIES WITH SERIOUS COMORBIDITY AND BODY MASS INDEX (BMI) OF 35.0 TO 35.9 IN ADULT (H): ICD-10-CM

## 2023-12-11 DIAGNOSIS — F19.20 DRUG DEPENDENCE ON MAINTENANCE AGONIST THERAPY, NO SYMPTOMS (H): ICD-10-CM

## 2023-12-11 DIAGNOSIS — I10 ESSENTIAL HYPERTENSION: ICD-10-CM

## 2023-12-11 DIAGNOSIS — E66.812 CLASS 2 SEVERE OBESITY DUE TO EXCESS CALORIES WITH SERIOUS COMORBIDITY AND BODY MASS INDEX (BMI) OF 35.0 TO 35.9 IN ADULT (H): ICD-10-CM

## 2023-12-11 DIAGNOSIS — E88.810 METABOLIC SYNDROME: ICD-10-CM

## 2023-12-11 DIAGNOSIS — Z12.11 SCREEN FOR COLON CANCER: ICD-10-CM

## 2023-12-11 DIAGNOSIS — E11.65 TYPE 2 DIABETES MELLITUS WITH HYPERGLYCEMIA, WITHOUT LONG-TERM CURRENT USE OF INSULIN (H): Primary | ICD-10-CM

## 2023-12-11 PROCEDURE — 99214 OFFICE O/P EST MOD 30 MIN: CPT | Performed by: FAMILY MEDICINE

## 2023-12-11 RX ORDER — TIRZEPATIDE 2.5 MG/.5ML
2.5 INJECTION, SOLUTION SUBCUTANEOUS WEEKLY
Qty: 2 ML | Refills: 0 | Status: SHIPPED | OUTPATIENT
Start: 2023-12-11 | End: 2024-01-08

## 2023-12-11 ASSESSMENT — ENCOUNTER SYMPTOMS: CONSTITUTIONAL NEGATIVE: 1

## 2023-12-11 NOTE — ASSESSMENT & PLAN NOTE
Diabetic control has not really changed since I last saw him.  He has been delaying fully engaging in dietary change.  He did basically eliminate sugar sweetened beverages.  His employer has a relationship with [employer].  I am excited that he will have the support.  We reviewed the potential benefits of a ketogenic nutritional strategy.  The potential benefits of a ketogenic nutritional strategy  He takes a statin.  His blood pressure is reasonably well-controlled.  He does use tobacco products.  Hyperglycemia.  He is not a well-controlled diabetic but I think there is an opportunity.  Will continue current dose of metformin.  He was not able to start semaglutide under any brand.  When he was on Wegovy last year he had intolerable constipation.  We will see if he gets coverage for tirzepatide.  I like to see him back in 3 months as part of his next Suboxone visit to review progress.

## 2023-12-11 NOTE — PROGRESS NOTES
Assessment & Plan   Problem List Items Addressed This Visit       Class 2 severe obesity due to excess calories with serious comorbidity and body mass index (BMI) of 35.0 to 35.9 in adult (H)    Relevant Medications    tirzepatide (MOUNJARO) 2.5 MG/0.5ML pen    Drug dependence on maintenance agonist therapy, no symptoms (H)     Stable.  No misuse.  No diversion.  Continue current therapy.         Essential hypertension    Hypercholesterolemia    Metabolic syndrome    Type 2 diabetes mellitus with hyperglycemia, without long-term current use of insulin (H) - Primary     Diabetic control has not really changed since I last saw him.  He has been delaying fully engaging in dietary change.  He did basically eliminate sugar sweetened beverages.  His employer has a relationship with [employer].  I am excited that he will have the support.  We reviewed the potential benefits of a ketogenic nutritional strategy.  The potential benefits of a ketogenic nutritional strategy  He takes a statin.  His blood pressure is reasonably well-controlled.  He does use tobacco products.  Hyperglycemia.  He is not a well-controlled diabetic but I think there is an opportunity.  Will continue current dose of metformin.  He was not able to start semaglutide under any brand.  When he was on Wegovy last year he had intolerable constipation.  We will see if he gets coverage for tirzepatide.  I like to see him back in 3 months as part of his next Suboxone visit to review progress.         Relevant Medications    tirzepatide (MOUNJARO) 2.5 MG/0.5ML pen    Other Relevant Orders    HEMOGLOBIN A1C     Other Visit Diagnoses       Screen for colon cancer        Relevant Orders    DEYVI([employer])          Demetris Osman MD  [facility]    Violet Jamil is a 43 year old, presenting for the following health issues:  Diabetes (Follow-up/) and Recheck Medication        12/11/2023     2:30 PM   Additional  "Questions   Roomed by [facility]       Diabetes mellitus:   - getting support through [employer]   - has not really started.  \"Supposed to start today.\"   - he has a health .     -     History of Present Illness       Diabetes:   He presents for follow up of diabetes.    He is not checking blood glucose.         He has no concerns regarding his diabetes at this time.  He is having weight loss.  The patient has not had a diabetic eye exam in the last 12 months.            Review of Systems   Constitutional: Negative.    All other systems reviewed and are negative.           Objective    /80 (BP Location: Left arm, Patient Position: Sitting, Cuff Size: Adult Regular)   Pulse 82   Temp 97.8  F (36.6  C) (Oral)   Resp 16   Ht 1.676 m (5' 6\")   Wt 98.7 kg (217 lb 11.2 oz)   SpO2 98%   BMI 35.14 kg/m    Body mass index is 35.14 kg/m .  Physical Exam  Nursing note reviewed.   Constitutional:       General: He is not in acute distress.     Appearance: Normal appearance. He is not ill-appearing.   HENT:      Head: Normocephalic and atraumatic.   Eyes:      Extraocular Movements: Extraocular movements intact.      Conjunctiva/sclera: Conjunctivae normal.   Pulmonary:      Effort: Pulmonary effort is normal.   Neurological:      Mental Status: He is alert and oriented to person, place, and time.   Psychiatric:         Attention and Perception: Attention normal.         Mood and Affect: Mood normal.         Speech: Speech normal.         Thought Content: Thought content normal.                              "

## 2024-01-02 ENCOUNTER — MYC REFILL (OUTPATIENT)
Dept: FAMILY MEDICINE | Facility: CLINIC | Age: 44
End: 2024-01-02
Payer: COMMERCIAL

## 2024-01-02 DIAGNOSIS — F19.11 HISTORY OF DRUG ABUSE IN REMISSION (H): ICD-10-CM

## 2024-01-02 DIAGNOSIS — F19.20 DRUG DEPENDENCE ON MAINTENANCE AGONIST THERAPY, NO SYMPTOMS (H): ICD-10-CM

## 2024-01-02 RX ORDER — BUPRENORPHINE AND NALOXONE 8; 2 MG/1; MG/1
FILM, SOLUBLE BUCCAL; SUBLINGUAL
Qty: 90 EACH | Refills: 0 | Status: SHIPPED | OUTPATIENT
Start: 2024-01-02 | End: 2024-01-31

## 2024-01-08 DIAGNOSIS — E11.65 TYPE 2 DIABETES MELLITUS WITH HYPERGLYCEMIA, WITHOUT LONG-TERM CURRENT USE OF INSULIN (H): ICD-10-CM

## 2024-01-08 RX ORDER — TIRZEPATIDE 5 MG/.5ML
5 INJECTION, SOLUTION SUBCUTANEOUS WEEKLY
Qty: 2 ML | Refills: 0 | Status: SHIPPED | OUTPATIENT
Start: 2024-01-08 | End: 2024-02-26

## 2024-01-16 LAB — NONINV COLON CA DNA+OCC BLD SCRN STL QL: NORMAL

## 2024-01-22 ENCOUNTER — TRANSFERRED RECORDS (OUTPATIENT)
Dept: HEALTH INFORMATION MANAGEMENT | Facility: CLINIC | Age: 44
End: 2024-01-22
Payer: COMMERCIAL

## 2024-01-28 ENCOUNTER — HEALTH MAINTENANCE LETTER (OUTPATIENT)
Age: 44
End: 2024-01-28

## 2024-01-31 ENCOUNTER — MYC REFILL (OUTPATIENT)
Dept: FAMILY MEDICINE | Facility: CLINIC | Age: 44
End: 2024-01-31
Payer: COMMERCIAL

## 2024-01-31 DIAGNOSIS — F19.20 DRUG DEPENDENCE ON MAINTENANCE AGONIST THERAPY, NO SYMPTOMS (H): ICD-10-CM

## 2024-01-31 DIAGNOSIS — F19.11 HISTORY OF DRUG ABUSE IN REMISSION (H): ICD-10-CM

## 2024-01-31 RX ORDER — BUPRENORPHINE AND NALOXONE 8; 2 MG/1; MG/1
FILM, SOLUBLE BUCCAL; SUBLINGUAL
Qty: 90 EACH | Refills: 0 | Status: SHIPPED | OUTPATIENT
Start: 2024-01-31 | End: 2024-02-29

## 2024-02-06 LAB — NONINV COLON CA DNA+OCC BLD SCRN STL QL: NEGATIVE

## 2024-02-13 DIAGNOSIS — E66.01 MORBID (SEVERE) OBESITY DUE TO EXCESS CALORIES (H): ICD-10-CM

## 2024-02-13 DIAGNOSIS — E11.65 TYPE 2 DIABETES MELLITUS WITH HYPERGLYCEMIA (H): ICD-10-CM

## 2024-02-13 RX ORDER — ASPIRIN 81 MG/1
TABLET, COATED ORAL
Qty: 30 TABLET | Refills: 8 | Status: SHIPPED | OUTPATIENT
Start: 2024-02-13

## 2024-02-25 DIAGNOSIS — E11.65 TYPE 2 DIABETES MELLITUS WITH HYPERGLYCEMIA, WITHOUT LONG-TERM CURRENT USE OF INSULIN (H): ICD-10-CM

## 2024-02-25 NOTE — PROGRESS NOTES
Chief Complaint - Hearing loss    History of Present Illness - Omero Mota is a 43 year old male who presents to me today with hearing loss or a plugged feeling in his ears.  It has been present and noticeable for approximately months.  He has a history of multiple ear tubes placed, most recently by Dr. Hook 1/2022 (bilateral T tubes). He had eustachian tube balloon dilation with Dr. Chapman, but it didn't help. He has tinnitus. No pain. Has had drainage with ear aches in the past.     Past Medical History -   Patient Active Problem List   Diagnosis    Essential hypertension    Class 2 severe obesity due to excess calories with serious comorbidity and body mass index (BMI) of 35.0 to 35.9 in adult (H)    Eustachian tube dysfunction, bilateral    Tobacco abuse    Alcohol dependence in remission (H)    Palpitations    Hypercholesterolemia    Type 2 diabetes mellitus with hyperglycemia, without long-term current use of insulin (H)    Drug dependence on maintenance agonist therapy, no symptoms (H)    Metabolic syndrome    Lower urinary tract symptoms (LUTS)    Hyperhidrosis of palms and soles       Current Medications -   Current Outpatient Medications:     aluminum chloride (DRYSOL) 20 % external solution, Apply once daily at bedtime; once excessive sweating has stopped, may decrease to once or twice weekly, or as needed. Wash treated area in the morning., Disp: 60 mL, Rfl: 1    aspirin (ASPIRIN LOW DOSE) 81 MG EC tablet, TAKE 1 TABLET BY MOUTH EVERY DAY, Disp: 30 tablet, Rfl: 8    blood glucose (NO BRAND SPECIFIED) test strip, Use to test blood sugar 1-2 times daily or as directed. To accompany: Blood Glucose Monitor Brands: per insurance., Disp: 100 strip, Rfl: 6    blood glucose monitoring (NO BRAND SPECIFIED) meter device kit, Use to test blood sugar 1-2 times daily or as directed. Preferred blood glucose meter OR supplies to accompany: Blood Glucose Monitor Brands: per insurance., Disp: 1 kit, Rfl: 0     buprenorphine HCl-naloxone HCl (SUBOXONE) 8-2 MG per film, Place 1.5 film under tongue 2 (two) times per day., Disp: 90 each, Rfl: 0    Continuous Blood Gluc Sensor (FREESTYLE NOBLE 2 SENSOR) [facility], Change every 14 days., Disp: 2 each, Rfl: 5    lisinopril (ZESTRIL) 20 MG tablet, TAKE 1 TABLET BY MOUTH EVERY DAY, Disp: 30 tablet, Rfl: 11    metFORMIN (GLUCOPHAGE XR) 500 MG 24 hr tablet, Take 2 tablets (1,000 mg) by mouth daily (with breakfast), Disp: 180 tablet, Rfl: 1    pravastatin (PRAVACHOL) 20 MG tablet, TAKE 1 TABLET BY MOUTH EVERY DAY IN THE EVENING, Disp: 90 tablet, Rfl: 2    thin (NO BRAND SPECIFIED) lancets, Use with lanceting device (1-2x/day). 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     Social Determinants of Health     Financial Resource Strain: Unknown (12/11/2023)    Financial Resource Strain     Within the past 12 months, have you or your family members you live with been unable to get utilities (heat, electricity) when it was really needed?: Patient refused   Food Insecurity: Unknown (12/11/2023)    Food Insecurity     Within the past 12 months, did you worry that your food would run out before you got money to buy more?: Patient refused     Within the past 12 months, did the food you bought just not last and you didn t have money to get more?: Patient refused   Transportation Needs: Unknown (12/11/2023)    Transportation Needs     Within the past 12  months, has lack of transportation kept you from medical appointments, getting your medicines, non-medical meetings or appointments, work, or from getting things that you need?: Patient refused   Interpersonal Safety: Low Risk  (12/11/2023)    Interpersonal Safety     Do you feel physically and emotionally safe where you currently live?: Yes     Within the past 12 months, have you been hit, slapped, kicked or otherwise physically hurt by someone?: No     Within the past 12 months, have you been humiliated or emotionally abused in other ways by your partner or ex-partner?: No   Housing Stability: Unknown (12/11/2023)    Housing Stability     Do you have housing? : Patient refused     Are you worried about losing your housing?: Patient refused       Family History -   Family History   Problem Relation Age of Onset    Cancer Mother     Lung Cancer Mother     Hypertension Father     No Known Problems Brother     Prostate Cancer No family hx of        Physical Exam  General - The patient is in no distress.  Alert, answers questions and cooperates with examination appropriately.   Voice and Breathing - The patient was breathing comfortably without the use of accessory muscles. There was no wheezing, stridor, or stertor.  The patients voice was clear and strong.  Ears - clean canals. No tubes. The tympanic membrane on the right is intact, but appears dull with a middle ear effusion. No acute infection.  No fluid or purulence was seen in the external canal. The tympanic membrane on the left is intact, no middle ear effusion. No acute infection.  No fluid or purulence was seen in the external canal.       Bilateral Myringotomy with Tube Placement    Procedure - After discussion of the risks and benefits of myringotomy, informed consent was signed and placed in the chart.  I began with the left side.  I proceeded to position the patient in a semi-supine position in the examination chair.  Using the binocular surgical  microscope, I then proceeded to clean the left ear canal free cerumen and squamous debris.  I was able to see the tympanic membrane.  Using a small cotton tipped applicator, I applied a tiny coating of phenol onto the tympanic membrane.  After visualizing a good issac, I then proceeded to use a myringotomy knife to make a radially oriented incision in the posterior and inferior quadrant of the left tympanic membrane.  A large amount of thick yellow effusion was suctioned away.  Next, I proceeded to place a duravent tube through the incision.  After confirming good positioning and a clearly visible open tube, the procedure was complete.    I know moved on to the right side. Using the binocular surgical microscope, I then proceeded to clean the canal of cerumen and squamous debris.  I was able to see the tympanic membrane.  Using a small cotton tipped applicator, I applied a tiny coating of phenol onto the tympanic membrane.  After visualizing a good issac, I then proceeded to use a myringotomy knife to make a radially oriented incision in the tympanic membrane.  A very thick yellow effusion was suctioned away.  Next, I proceeded to place a duravent tube through the incision.  After confirming good positioning and a clearly visible open tube, the procedure was complete.      Assessment and Plan -     ICD-10-CM    1. Eustachian tube dysfunction, bilateral  H69.93       2. OME (otitis media with effusion), bilateral  H65.93 TYMPANOSTOMY W LOCAL/TOPICAL ANESTH     ofloxacin (FLOXIN) 0.3 % otic solution          Omero Mota is a 43 year old male who presents to me today with hearing loss.  This is most consistent with a middle ear effusion in both ears.  This is a chronic issue due to chronic eustachian tube dysfunction.  He has had numerous tubes placed before.  He even had eustachian tube balloon dilation which did not help.  He had very thick copious mucoid effusion in both ears I suctioned out.  Dura-Vent  were placed.  He will use ofloxacin.  Return as needed.      Jay Magana MD  Otolaryngology  [facility]

## 2024-02-26 RX ORDER — TIRZEPATIDE 7.5 MG/.5ML
7.5 INJECTION, SOLUTION SUBCUTANEOUS WEEKLY
Qty: 2 ML | Refills: 0 | Status: SHIPPED | OUTPATIENT
Start: 2024-02-26 | End: 2024-03-19

## 2024-02-27 ENCOUNTER — OFFICE VISIT (OUTPATIENT)
Dept: OTOLARYNGOLOGY | Facility: CLINIC | Age: 44
End: 2024-02-27
Payer: COMMERCIAL

## 2024-02-27 VITALS
DIASTOLIC BLOOD PRESSURE: 84 MMHG | SYSTOLIC BLOOD PRESSURE: 133 MMHG | RESPIRATION RATE: 18 BRPM | OXYGEN SATURATION: 98 % | HEART RATE: 94 BPM

## 2024-02-27 DIAGNOSIS — H69.93 EUSTACHIAN TUBE DYSFUNCTION, BILATERAL: Primary | ICD-10-CM

## 2024-02-27 DIAGNOSIS — H65.93 OME (OTITIS MEDIA WITH EFFUSION), BILATERAL: ICD-10-CM

## 2024-02-27 PROCEDURE — 69433 CREATE EARDRUM OPENING: CPT | Mod: 50 | Performed by: OTOLARYNGOLOGY

## 2024-02-27 PROCEDURE — 99213 OFFICE O/P EST LOW 20 MIN: CPT | Mod: 25 | Performed by: OTOLARYNGOLOGY

## 2024-02-27 RX ORDER — OFLOXACIN 3 MG/ML
5 SOLUTION AURICULAR (OTIC) 2 TIMES DAILY
Qty: 10 ML | Refills: 1 | Status: SHIPPED | OUTPATIENT
Start: 2024-02-27

## 2024-02-27 NOTE — LETTER
2/27/2024         RE: Omero Mota  [address] [address]        Dear Colleague,    Thank you for referring your patient, Omero Mota, to the [facility]. Please see a copy of my visit note below.    Chief Complaint - Hearing loss    History of Present Illness - Omero Mota is a 43 year old male who presents to me today with hearing loss or a plugged feeling in his ears.  It has been present and noticeable for approximately months.  He has a history of multiple ear tubes placed, most recently by Dr. Hook 1/2022 (bilateral T tubes). He had eustachian tube balloon dilation with Dr. Chapman, but it didn't help. He has tinnitus. No pain. Has had drainage with ear aches in the past.     Past Medical History -   Patient Active Problem List   Diagnosis     Essential hypertension     Class 2 severe obesity due to excess calories with serious comorbidity and body mass index (BMI) of 35.0 to 35.9 in adult (H)     Eustachian tube dysfunction, bilateral     Tobacco abuse     Alcohol dependence in remission (H)     Palpitations     Hypercholesterolemia     Type 2 diabetes mellitus with hyperglycemia, without long-term current use of insulin (H)     Drug dependence on maintenance agonist therapy, no symptoms (H)     Metabolic syndrome     Lower urinary tract symptoms (LUTS)     Hyperhidrosis of palms and soles       Current Medications -   Current Outpatient Medications:      aluminum chloride (DRYSOL) 20 % external solution, Apply once daily at bedtime; once excessive sweating has stopped, may decrease to once or twice weekly, or as needed. Wash treated area in the morning., Disp: 60 mL, Rfl: 1     aspirin (ASPIRIN LOW DOSE) 81 MG EC tablet, TAKE 1 TABLET BY MOUTH EVERY DAY, Disp: 30 tablet, Rfl: 8     blood glucose (NO BRAND SPECIFIED) test strip, Use to test blood sugar 1-2 times daily or as directed. To accompany: Blood Glucose Monitor Brands: per insurance.,  Disp: 100 strip, Rfl: 6     blood glucose monitoring (NO BRAND SPECIFIED) meter device kit, Use to test blood sugar 1-2 times daily or as directed. Preferred blood glucose meter OR supplies to accompany: Blood Glucose Monitor Brands: per insurance., Disp: 1 kit, Rfl: 0     buprenorphine HCl-naloxone HCl (SUBOXONE) 8-2 MG per film, Place 1.5 film under tongue 2 (two) times per day., Disp: 90 each, Rfl: 0     Continuous Blood Gluc Sensor (FREESTYLE NOBLE 2 SENSOR) [facility], Change every 14 days., Disp: 2 each, Rfl: 5     lisinopril (ZESTRIL) 20 MG tablet, TAKE 1 TABLET BY MOUTH EVERY DAY, Disp: 30 tablet, Rfl: 11     metFORMIN (GLUCOPHAGE XR) 500 MG 24 hr tablet, Take 2 tablets (1,000 mg) by mouth daily (with breakfast), Disp: 180 tablet, Rfl: 1     pravastatin (PRAVACHOL) 20 MG tablet, TAKE 1 TABLET BY MOUTH EVERY DAY IN THE EVENING, Disp: 90 tablet, Rfl: 2     thin (NO BRAND SPECIFIED) lancets, Use with lanceting device (1-2x/day). 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     Social Determinants of Health     Financial Resource Strain: Unknown (12/11/2023)    Financial Resource Strain      Within the past 12 months, have you or your family members you live with been unable to get utilities (heat, electricity) when it was really needed?: Patient refused   Food Insecurity: Unknown (12/11/2023)    Food Insecurity      Within the past 12 months, did you worry that your  food would run out before you got money to buy more?: Patient refused      Within the past 12 months, did the food you bought just not last and you didn t have money to get more?: Patient refused   Transportation Needs: Unknown (12/11/2023)    Transportation Needs      Within the past 12 months, has lack of transportation kept you from medical appointments, getting your medicines, non-medical meetings or appointments, work, or from getting things that you need?: Patient refused   Interpersonal Safety: Low Risk  (12/11/2023)    Interpersonal Safety      Do you feel physically and emotionally safe where you currently live?: Yes      Within the past 12 months, have you been hit, slapped, kicked or otherwise physically hurt by someone?: No      Within the past 12 months, have you been humiliated or emotionally abused in other ways by your partner or ex-partner?: No   Housing Stability: Unknown (12/11/2023)    Housing Stability      Do you have housing? : Patient refused      Are you worried about losing your housing?: Patient refused       Family History -   Family History   Problem Relation Age of Onset     Cancer Mother      Lung Cancer Mother      Hypertension Father      No Known Problems Brother      Prostate Cancer No family hx of        Physical Exam  General - The patient is in no distress.  Alert, answers questions and cooperates with examination appropriately.   Voice and Breathing - The patient was breathing comfortably without the use of accessory muscles. There was no wheezing, stridor, or stertor.  The patients voice was clear and strong.  Ears - clean canals. No tubes. The tympanic membrane on the right is intact, but appears dull with a middle ear effusion. No acute infection.  No fluid or purulence was seen in the external canal. The tympanic membrane on the left is intact, no middle ear effusion. No acute infection.  No fluid or purulence was seen in the external canal.       Bilateral Myringotomy with  Tube Placement    Procedure - After discussion of the risks and benefits of myringotomy, informed consent was signed and placed in the chart.  I began with the left side.  I proceeded to position the patient in a semi-supine position in the examination chair.  Using the binocular surgical microscope, I then proceeded to clean the left ear canal free cerumen and squamous debris.  I was able to see the tympanic membrane.  Using a small cotton tipped applicator, I applied a tiny coating of phenol onto the tympanic membrane.  After visualizing a good issac, I then proceeded to use a myringotomy knife to make a radially oriented incision in the posterior and inferior quadrant of the left tympanic membrane.  A large amount of thick yellow effusion was suctioned away.  Next, I proceeded to place a duravent tube through the incision.  After confirming good positioning and a clearly visible open tube, the procedure was complete.    I know moved on to the right side. Using the binocular surgical microscope, I then proceeded to clean the canal of cerumen and squamous debris.  I was able to see the tympanic membrane.  Using a small cotton tipped applicator, I applied a tiny coating of phenol onto the tympanic membrane.  After visualizing a good issac, I then proceeded to use a myringotomy knife to make a radially oriented incision in the tympanic membrane.  A very thick yellow effusion was suctioned away.  Next, I proceeded to place a duravent tube through the incision.  After confirming good positioning and a clearly visible open tube, the procedure was complete.      Assessment and Plan -     ICD-10-CM    1. Eustachian tube dysfunction, bilateral  H69.93       2. OME (otitis media with effusion), bilateral  H65.93 TYMPANOSTOMY W LOCAL/TOPICAL ANESTH     ofloxacin (FLOXIN) 0.3 % otic solution          Omero Mota is a 43 year old male who presents to me today with hearing loss.  This is most consistent with a middle  ear effusion in both ears.  This is a chronic issue due to chronic eustachian tube dysfunction.  He has had numerous tubes placed before.  He even had eustachian tube balloon dilation which did not help.  He had very thick copious mucoid effusion in both ears I suctioned out.  Dura-Vent were placed.  He will use ofloxacin.  Return as needed.      Jay Magana MD  Otolaryngology  [facility]      Again, thank you for allowing me to participate in the care of your patient.        Sincerely,        Jay Magana MD

## 2024-02-29 ENCOUNTER — MYC REFILL (OUTPATIENT)
Dept: FAMILY MEDICINE | Facility: CLINIC | Age: 44
End: 2024-02-29
Payer: COMMERCIAL

## 2024-02-29 DIAGNOSIS — F19.11 HISTORY OF DRUG ABUSE IN REMISSION (H): ICD-10-CM

## 2024-02-29 DIAGNOSIS — F19.20 DRUG DEPENDENCE ON MAINTENANCE AGONIST THERAPY, NO SYMPTOMS (H): ICD-10-CM

## 2024-02-29 RX ORDER — BUPRENORPHINE AND NALOXONE 8; 2 MG/1; MG/1
FILM, SOLUBLE BUCCAL; SUBLINGUAL
Qty: 90 EACH | Refills: 0 | Status: SHIPPED | OUTPATIENT
Start: 2024-02-29 | End: 2024-03-28

## 2024-03-24 DIAGNOSIS — E11.65 TYPE 2 DIABETES MELLITUS WITH HYPERGLYCEMIA, WITHOUT LONG-TERM CURRENT USE OF INSULIN (H): Primary | ICD-10-CM

## 2024-03-25 RX ORDER — TIRZEPATIDE 10 MG/.5ML
10 INJECTION, SOLUTION SUBCUTANEOUS WEEKLY
Qty: 2 ML | Refills: 1 | Status: SHIPPED | OUTPATIENT
Start: 2024-03-25 | End: 2024-03-29

## 2024-03-27 DIAGNOSIS — E11.65 TYPE 2 DIABETES MELLITUS WITH HYPERGLYCEMIA, WITHOUT LONG-TERM CURRENT USE OF INSULIN (H): ICD-10-CM

## 2024-03-27 RX ORDER — METFORMIN HCL 500 MG
1000 TABLET, EXTENDED RELEASE 24 HR ORAL
Qty: 60 TABLET | Refills: 5 | Status: SHIPPED | OUTPATIENT
Start: 2024-03-27 | End: 2024-06-25

## 2024-03-28 ENCOUNTER — MYC REFILL (OUTPATIENT)
Dept: FAMILY MEDICINE | Facility: CLINIC | Age: 44
End: 2024-03-28
Payer: COMMERCIAL

## 2024-03-28 ENCOUNTER — MYC MEDICAL ADVICE (OUTPATIENT)
Dept: FAMILY MEDICINE | Facility: CLINIC | Age: 44
End: 2024-03-28
Payer: COMMERCIAL

## 2024-03-28 DIAGNOSIS — E11.65 TYPE 2 DIABETES MELLITUS WITH HYPERGLYCEMIA, WITHOUT LONG-TERM CURRENT USE OF INSULIN (H): ICD-10-CM

## 2024-03-28 DIAGNOSIS — F19.11 HISTORY OF DRUG ABUSE IN REMISSION (H): ICD-10-CM

## 2024-03-28 DIAGNOSIS — F19.20 DRUG DEPENDENCE ON MAINTENANCE AGONIST THERAPY, NO SYMPTOMS (H): ICD-10-CM

## 2024-03-28 RX ORDER — BUPRENORPHINE AND NALOXONE 8; 2 MG/1; MG/1
FILM, SOLUBLE BUCCAL; SUBLINGUAL
Qty: 90 EACH | Refills: 0 | Status: SHIPPED | OUTPATIENT
Start: 2024-03-28 | End: 2024-04-25

## 2024-03-29 RX ORDER — TIRZEPATIDE 10 MG/.5ML
10 INJECTION, SOLUTION SUBCUTANEOUS WEEKLY
Qty: 2 ML | Refills: 1 | Status: SHIPPED | OUTPATIENT
Start: 2024-03-29 | End: 2024-06-25

## 2024-04-10 ENCOUNTER — TELEPHONE (OUTPATIENT)
Dept: FAMILY MEDICINE | Facility: CLINIC | Age: 44
End: 2024-04-10

## 2024-04-10 NOTE — TELEPHONE ENCOUNTER
Patient Quality Outreach    Patient is due for the following:   Diabetes -  A1C, Eye Exam, Microalbumin, and Foot Exam    Next Steps:   Schedule a office visit for diabetes check    Type of outreach:    Sent Unbound message.      Questions for provider review:    None           Rama Mason MA  Chart routed to Care Team.

## 2024-04-25 ENCOUNTER — MYC REFILL (OUTPATIENT)
Dept: FAMILY MEDICINE | Facility: CLINIC | Age: 44
End: 2024-04-25
Payer: COMMERCIAL

## 2024-04-25 DIAGNOSIS — F19.11 HISTORY OF DRUG ABUSE IN REMISSION (H): ICD-10-CM

## 2024-04-25 DIAGNOSIS — F19.20 DRUG DEPENDENCE ON MAINTENANCE AGONIST THERAPY, NO SYMPTOMS (H): ICD-10-CM

## 2024-04-25 RX ORDER — BUPRENORPHINE AND NALOXONE 8; 2 MG/1; MG/1
FILM, SOLUBLE BUCCAL; SUBLINGUAL
Qty: 90 EACH | Refills: 0 | Status: SHIPPED | OUTPATIENT
Start: 2024-04-25 | End: 2024-05-23

## 2024-05-14 NOTE — TELEPHONE ENCOUNTER
Patient Quality Outreach    Patient is due for the following:   Diabetes -  A1C, Eye Exam, Microalbumin, and Foot Exam     Next Steps:   Patient has upcoming appointment, these items will be addressed at that time.  Next appointment x 6/25/24  Type of outreach:    Chart review performed, no outreach needed.      Questions for provider review:    None           Rama Mason MA

## 2024-05-23 ENCOUNTER — MYC REFILL (OUTPATIENT)
Dept: FAMILY MEDICINE | Facility: CLINIC | Age: 44
End: 2024-05-23
Payer: COMMERCIAL

## 2024-05-23 DIAGNOSIS — F19.11 HISTORY OF DRUG ABUSE IN REMISSION (H): ICD-10-CM

## 2024-05-23 DIAGNOSIS — F19.20 DRUG DEPENDENCE ON MAINTENANCE AGONIST THERAPY, NO SYMPTOMS (H): ICD-10-CM

## 2024-05-23 RX ORDER — BUPRENORPHINE AND NALOXONE 8; 2 MG/1; MG/1
FILM, SOLUBLE BUCCAL; SUBLINGUAL
Qty: 90 EACH | Refills: 0 | Status: SHIPPED | OUTPATIENT
Start: 2024-05-23 | End: 2024-06-21

## 2024-05-24 ENCOUNTER — MYC MEDICAL ADVICE (OUTPATIENT)
Dept: FAMILY MEDICINE | Facility: CLINIC | Age: 44
End: 2024-05-24
Payer: COMMERCIAL

## 2024-05-24 DIAGNOSIS — E11.65 TYPE 2 DIABETES MELLITUS WITH HYPERGLYCEMIA, WITHOUT LONG-TERM CURRENT USE OF INSULIN (H): ICD-10-CM

## 2024-06-16 ENCOUNTER — HEALTH MAINTENANCE LETTER (OUTPATIENT)
Age: 44
End: 2024-06-16

## 2024-06-16 ENCOUNTER — MYC MEDICAL ADVICE (OUTPATIENT)
Dept: FAMILY MEDICINE | Facility: CLINIC | Age: 44
End: 2024-06-16
Payer: COMMERCIAL

## 2024-06-16 DIAGNOSIS — E11.65 TYPE 2 DIABETES MELLITUS WITH HYPERGLYCEMIA, WITHOUT LONG-TERM CURRENT USE OF INSULIN (H): Primary | ICD-10-CM

## 2024-06-21 ENCOUNTER — MYC REFILL (OUTPATIENT)
Dept: FAMILY MEDICINE | Facility: CLINIC | Age: 44
End: 2024-06-21
Payer: COMMERCIAL

## 2024-06-21 DIAGNOSIS — F19.20 DRUG DEPENDENCE ON MAINTENANCE AGONIST THERAPY, NO SYMPTOMS (H): ICD-10-CM

## 2024-06-21 DIAGNOSIS — F19.11 HISTORY OF DRUG ABUSE IN REMISSION (H): ICD-10-CM

## 2024-06-21 RX ORDER — BUPRENORPHINE AND NALOXONE 8; 2 MG/1; MG/1
FILM, SOLUBLE BUCCAL; SUBLINGUAL
Qty: 90 EACH | Refills: 0 | Status: SHIPPED | OUTPATIENT
Start: 2024-06-21 | End: 2024-07-19

## 2024-06-25 ENCOUNTER — OFFICE VISIT (OUTPATIENT)
Dept: FAMILY MEDICINE | Facility: CLINIC | Age: 44
End: 2024-06-25
Payer: COMMERCIAL

## 2024-06-25 VITALS
DIASTOLIC BLOOD PRESSURE: 80 MMHG | HEART RATE: 84 BPM | BODY MASS INDEX: 29.46 KG/M2 | WEIGHT: 183.3 LBS | TEMPERATURE: 98.1 F | RESPIRATION RATE: 16 BRPM | OXYGEN SATURATION: 96 % | SYSTOLIC BLOOD PRESSURE: 125 MMHG | HEIGHT: 66 IN

## 2024-06-25 DIAGNOSIS — F10.21 ALCOHOL DEPENDENCE IN REMISSION (H): ICD-10-CM

## 2024-06-25 DIAGNOSIS — I10 ESSENTIAL HYPERTENSION: ICD-10-CM

## 2024-06-25 DIAGNOSIS — Z11.4 SCREENING FOR HIV (HUMAN IMMUNODEFICIENCY VIRUS): ICD-10-CM

## 2024-06-25 DIAGNOSIS — F19.20 DRUG DEPENDENCE ON MAINTENANCE AGONIST THERAPY, NO SYMPTOMS (H): ICD-10-CM

## 2024-06-25 DIAGNOSIS — E11.65 TYPE 2 DIABETES MELLITUS WITH HYPERGLYCEMIA, WITHOUT LONG-TERM CURRENT USE OF INSULIN (H): Primary | ICD-10-CM

## 2024-06-25 DIAGNOSIS — I10 ESSENTIAL (PRIMARY) HYPERTENSION: ICD-10-CM

## 2024-06-25 LAB
AMPHETAMINES UR QL: NOT DETECTED
ANION GAP SERPL CALCULATED.3IONS-SCNC: 11 MMOL/L (ref 7–15)
BARBITURATES UR QL SCN: NOT DETECTED
BENZODIAZ UR QL SCN: NOT DETECTED
BUN SERPL-MCNC: 11.3 MG/DL (ref 6–20)
BUPRENORPHINE UR QL: DETECTED
CALCIUM SERPL-MCNC: 9.1 MG/DL (ref 8.6–10)
CANNABINOIDS UR QL: NOT DETECTED
CHLORIDE SERPL-SCNC: 106 MMOL/L (ref 98–107)
CHOLEST SERPL-MCNC: 110 MG/DL
COCAINE UR QL SCN: NOT DETECTED
CREAT SERPL-MCNC: 0.93 MG/DL (ref 0.67–1.17)
CREAT UR-MCNC: 114 MG/DL
D-METHAMPHET UR QL: NOT DETECTED
DEPRECATED HCO3 PLAS-SCNC: 24 MMOL/L (ref 22–29)
EGFRCR SERPLBLD CKD-EPI 2021: >90 ML/MIN/1.73M2
FASTING STATUS PATIENT QL REPORTED: ABNORMAL
FASTING STATUS PATIENT QL REPORTED: NORMAL
GLUCOSE SERPL-MCNC: 109 MG/DL (ref 70–99)
HBA1C MFR BLD: 5.6 % (ref 0–5.6)
HDLC SERPL-MCNC: 45 MG/DL
HIV 1+2 AB+HIV1 P24 AG SERPL QL IA: NONREACTIVE
HOLD SPECIMEN: NORMAL
LDLC SERPL CALC-MCNC: 47 MG/DL
METHADONE UR QL SCN: NOT DETECTED
MICROALBUMIN UR-MCNC: 34.3 MG/L
MICROALBUMIN/CREAT UR: 30.09 MG/G CR (ref 0–17)
NONHDLC SERPL-MCNC: 65 MG/DL
OPIATES UR QL SCN: NOT DETECTED
OXYCODONE UR QL SCN: NOT DETECTED
PCP UR QL SCN: NOT DETECTED
POTASSIUM SERPL-SCNC: 3.9 MMOL/L (ref 3.4–5.3)
SODIUM SERPL-SCNC: 141 MMOL/L (ref 135–145)
TRICYCLICS UR QL SCN: NOT DETECTED
TRIGL SERPL-MCNC: 91 MG/DL

## 2024-06-25 PROCEDURE — 82043 UR ALBUMIN QUANTITATIVE: CPT | Performed by: FAMILY MEDICINE

## 2024-06-25 PROCEDURE — 87389 HIV-1 AG W/HIV-1&-2 AB AG IA: CPT | Performed by: FAMILY MEDICINE

## 2024-06-25 PROCEDURE — 83036 HEMOGLOBIN GLYCOSYLATED A1C: CPT | Performed by: FAMILY MEDICINE

## 2024-06-25 PROCEDURE — 80061 LIPID PANEL: CPT | Performed by: FAMILY MEDICINE

## 2024-06-25 PROCEDURE — 36415 COLL VENOUS BLD VENIPUNCTURE: CPT | Performed by: FAMILY MEDICINE

## 2024-06-25 PROCEDURE — 80306 DRUG TEST PRSMV INSTRMNT: CPT | Performed by: FAMILY MEDICINE

## 2024-06-25 PROCEDURE — 99214 OFFICE O/P EST MOD 30 MIN: CPT | Performed by: FAMILY MEDICINE

## 2024-06-25 PROCEDURE — 82570 ASSAY OF URINE CREATININE: CPT | Performed by: FAMILY MEDICINE

## 2024-06-25 PROCEDURE — 80048 BASIC METABOLIC PNL TOTAL CA: CPT | Performed by: FAMILY MEDICINE

## 2024-06-25 PROCEDURE — G2211 COMPLEX E/M VISIT ADD ON: HCPCS | Performed by: FAMILY MEDICINE

## 2024-06-25 RX ORDER — LISINOPRIL 10 MG/1
10 TABLET ORAL DAILY
Qty: 90 TABLET | Refills: 1 | Status: SHIPPED | OUTPATIENT
Start: 2024-06-25 | End: 2024-09-23

## 2024-06-25 NOTE — ASSESSMENT & PLAN NOTE
This patient's hemoglobin A1c today is 5.6% down from 12.3% 6 months ago.  He has had a profound response to tirzepatide with minimal side effects.  Given the suppression of appetite and my concern that he is not consuming adequate protein he probably will end up on either 10 mg or 12.5 mg.  He has not been on stable doses has been titrating.  For now we will proceed with 15 mg for at least a month but if it is excessive we will decrease his dose.  I suggested 100 g of protein per day.  I also suggested that he focus on increasing overall consumption of vegetables and fruit.  He still chews but otherwise is meeting goals for diabetic care.  He takes a statin and an aspirin.  Blood pressure controlled today.  Recheck in 3-6 months.

## 2024-06-25 NOTE — ASSESSMENT & PLAN NOTE
With his weight loss, he is now having symptoms of hypotension.  Decrease lisinopril dose from 20 mg down to 10 mg.  Check basic metabolic panel.

## 2024-06-25 NOTE — PROGRESS NOTES
Assessment & Plan   Problem List Items Addressed This Visit       Alcohol dependence in remission (H)     Ongoing long-term maintenance.  Doing well.         Drug dependence on maintenance agonist therapy, no symptoms (H)     Stable. Continue current medication.          Relevant Orders    Urine Drug Screen Clinic    Essential hypertension     With his weight loss, he is now having symptoms of hypotension.  Decrease lisinopril dose from 20 mg down to 10 mg.  Check basic metabolic panel.         Relevant Medications    lisinopril (ZESTRIL) 10 MG tablet    Type 2 diabetes mellitus with hyperglycemia, without long-term current use of insulin (H) - Primary     This patient's hemoglobin A1c today is 5.6% down from 12.3% 6 months ago.  He has had a profound response to tirzepatide with minimal side effects.  Given the suppression of appetite and my concern that he is not consuming adequate protein he probably will end up on either 10 mg or 12.5 mg.  He has not been on stable doses has been titrating.  For now we will proceed with 15 mg for at least a month but if it is excessive we will decrease his dose.  I suggested 100 g of protein per day.  I also suggested that he focus on increasing overall consumption of vegetables and fruit.  He still chews but otherwise is meeting goals for diabetic care.  He takes a statin and an aspirin.  Blood pressure controlled today.  Recheck in 3-6 months.         Relevant Orders    Albumin Random Urine Quantitative with Creat Ratio    Basic metabolic panel  (Ca, Cl, CO2, Creat, Gluc, K, Na, BUN)    Lipid panel reflex to direct LDL Fasting     Other Visit Diagnoses       Screening for HIV (human immunodeficiency virus)        Relevant Orders    HIV Antigen Antibody Combo    Essential (primary) hypertension        Relevant Medications    lisinopril (ZESTRIL) 10 MG tablet    Other Relevant Orders    Basic metabolic panel  (Ca, Cl, CO2, Creat, Gluc, K, Na, BUN)                 "  BMI  Estimated body mass index is 29.59 kg/m  as calculated from the following:    Height as of this encounter: 1.676 m (5' 6\").    Weight as of this encounter: 83.1 kg (183 lb 4.8 oz).   Weight management plan: Specific weight management program called [facility] and Comprehensive Weight Management discussed    The longitudinal plan of care for the diagnosis(es)/condition(s) as documented were addressed during this visit. Due to the added complexity in care, I will continue to support Omero in the subsequent management and with ongoing continuity of care.    Consent was obtained from the patient to use an AI documentation tool in the creation of this note.    Violet Jamil is a 44 year old, presenting for the following health issues:  Diabetes (Follow-up )        6/25/2024     9:42 AM   Additional Questions   Roomed by park     Diabetes: Patient is currently on 12.5 mg of tirzepatide.  No side effects (previously had side effects with semaglutide-constipation).  Profound appetite suppression.  He is down approximately 50 pounds since starting this medicine.  He simply does not have any drive to eat.  Has been trying to add more protein.  Energy has been relatively stable although he does not exercise on a regular basis.  No hypoglycemic symptoms    Hypertension: Increasingly he is having symptoms of lightheadedness when he moves from a crouched position to a standing position.  He has not fallen.    Substance abuse: No cravings for alcohol or medications.  No side effects from Suboxone.    History of Present Illness       Diabetes:   He presents for follow up of diabetes.    He is not checking blood glucose.         He has no concerns regarding his diabetes at this time.  He is having weight loss.  The patient has not had a diabetic eye exam in the last 12 months.         He is taking medications regularly.        Objective    /80 (BP Location: Left arm, Patient Position: Sitting, Cuff Size: Adult Regular)   " "Pulse 84   Temp 98.1  F (36.7  C) (Oral)   Resp 16   Ht 1.676 m (5' 6\")   Wt 83.1 kg (183 lb 4.8 oz)   SpO2 96%   BMI 29.59 kg/m    Body mass index is 29.59 kg/m .  Physical Exam  Vitals and nursing note reviewed.   Constitutional:       General: He is not in acute distress.     Appearance: Normal appearance. He is not ill-appearing.   HENT:      Head: Normocephalic and atraumatic.   Eyes:      General: No scleral icterus.     Extraocular Movements: Extraocular movements intact.      Conjunctiva/sclera: Conjunctivae normal.   Pulmonary:      Effort: Pulmonary effort is normal. No respiratory distress.      Breath sounds: No wheezing.   Musculoskeletal:         General: No swelling. Normal range of motion.      Right knee: No swelling, deformity, effusion, erythema or bony tenderness. Normal range of motion. No tenderness. No LCL laxity, MCL laxity, ACL laxity or PCL laxity. Normal meniscus and normal patellar mobility. Normal pulse.      Instability Tests: Anterior drawer test negative.      Left knee: No swelling, deformity, effusion, erythema or bony tenderness. Normal range of motion. No tenderness. No LCL laxity, MCL laxity, ACL laxity or PCL laxity.Normal meniscus and normal patellar mobility. Normal pulse.      Instability Tests: Anterior drawer test negative.   Skin:     Findings: No rash.   Neurological:      General: No focal deficit present.      Mental Status: He is alert and oriented to person, place, and time.   Psychiatric:         Attention and Perception: Attention normal.         Mood and Affect: Mood normal.         Speech: Speech normal.         Behavior: Behavior normal.         Thought Content: Thought content normal.         Judgment: Judgment normal.                  Signed Electronically by: Demetris Osman MD    "

## 2024-07-19 ENCOUNTER — MYC REFILL (OUTPATIENT)
Dept: FAMILY MEDICINE | Facility: CLINIC | Age: 44
End: 2024-07-19
Payer: COMMERCIAL

## 2024-07-19 DIAGNOSIS — F19.11 HISTORY OF DRUG ABUSE IN REMISSION (H): ICD-10-CM

## 2024-07-19 DIAGNOSIS — F19.20 DRUG DEPENDENCE ON MAINTENANCE AGONIST THERAPY, NO SYMPTOMS (H): ICD-10-CM

## 2024-07-19 RX ORDER — BUPRENORPHINE AND NALOXONE 8; 2 MG/1; MG/1
FILM, SOLUBLE BUCCAL; SUBLINGUAL
Qty: 90 EACH | Refills: 0 | Status: SHIPPED | OUTPATIENT
Start: 2024-07-19 | End: 2024-08-14

## 2024-07-19 NOTE — TELEPHONE ENCOUNTER
Covering provider. PDMP reviewed today which shows last fill of requested medication was 6/21/2024. Last OV with primary prescriber was 6/25/2024. Refill today is consistent with primary prescriber's plan as outlined in their documentation.

## 2024-08-14 ENCOUNTER — MYC REFILL (OUTPATIENT)
Dept: FAMILY MEDICINE | Facility: CLINIC | Age: 44
End: 2024-08-14
Payer: COMMERCIAL

## 2024-08-14 DIAGNOSIS — F19.20 DRUG DEPENDENCE ON MAINTENANCE AGONIST THERAPY, NO SYMPTOMS (H): ICD-10-CM

## 2024-08-14 DIAGNOSIS — F19.11 HISTORY OF DRUG ABUSE IN REMISSION (H): ICD-10-CM

## 2024-08-15 RX ORDER — BUPRENORPHINE AND NALOXONE 8; 2 MG/1; MG/1
FILM, SOLUBLE BUCCAL; SUBLINGUAL
Qty: 90 EACH | Refills: 0 | Status: SHIPPED | OUTPATIENT
Start: 2024-08-15 | End: 2024-09-12

## 2024-08-15 NOTE — TELEPHONE ENCOUNTER
Covering provider. PDMP reviewed today which shows last fill of requested medication was 7/19/2024. Last OV with primary prescriber was 6/25/2024. Refill today is consistent with primary prescriber's plan as outlined in their documentation.

## 2024-08-16 DIAGNOSIS — E11.65 TYPE 2 DIABETES MELLITUS WITH HYPERGLYCEMIA, WITHOUT LONG-TERM CURRENT USE OF INSULIN (H): ICD-10-CM

## 2024-08-16 RX ORDER — TIRZEPATIDE 15 MG/.5ML
INJECTION, SOLUTION SUBCUTANEOUS
Refills: 0 | OUTPATIENT
Start: 2024-08-16

## 2024-09-10 DIAGNOSIS — E11.65 TYPE 2 DIABETES MELLITUS WITH HYPERGLYCEMIA, WITHOUT LONG-TERM CURRENT USE OF INSULIN (H): ICD-10-CM

## 2024-09-10 DIAGNOSIS — E78.00 HYPERCHOLESTEROLEMIA: ICD-10-CM

## 2024-09-10 RX ORDER — PRAVASTATIN SODIUM 20 MG
TABLET ORAL
Qty: 90 TABLET | Refills: 2 | Status: SHIPPED | OUTPATIENT
Start: 2024-09-10

## 2024-09-12 ENCOUNTER — MYC REFILL (OUTPATIENT)
Dept: FAMILY MEDICINE | Facility: CLINIC | Age: 44
End: 2024-09-12
Payer: COMMERCIAL

## 2024-09-12 DIAGNOSIS — F19.11 HISTORY OF DRUG ABUSE IN REMISSION (H): ICD-10-CM

## 2024-09-12 DIAGNOSIS — F19.20 DRUG DEPENDENCE ON MAINTENANCE AGONIST THERAPY, NO SYMPTOMS (H): ICD-10-CM

## 2024-09-12 RX ORDER — BUPRENORPHINE AND NALOXONE 8; 2 MG/1; MG/1
FILM, SOLUBLE BUCCAL; SUBLINGUAL
Qty: 90 EACH | Refills: 0 | Status: SHIPPED | OUTPATIENT
Start: 2024-09-12

## 2024-09-13 ENCOUNTER — TELEPHONE (OUTPATIENT)
Dept: FAMILY MEDICINE | Facility: CLINIC | Age: 44
End: 2024-09-13

## 2024-09-13 NOTE — LETTER
Omero Mota     [address] [place] [id number]        9/13/2024    Dear Omero,     In reviewing your records, we have determined a gap in your preventive services. Based on your age and health history, we recommend the follow service.     General Physical  Diabetic check      If you have had the service elsewhere, please contact us so we can update our records. Please let us know if you have transferred your care to another clinic.    Please call [phone] to schedule this appointment.    We believe that a strong preventive care program, including regular physicals and follow-up care is an important part of a healthy lifestyle and we are committed to helping you maintain your health.    Thank you for choosing us as your health care provider.    Sincerely,     [facility]

## 2024-09-13 NOTE — TELEPHONE ENCOUNTER
Patient Quality Outreach    Patient is due for the following:   Diabetes -  A1C, Eye Exam, and Foot Exam  Physical Preventive Adult Physical    Next Steps:   Schedule a Adult Preventative    Type of outreach:    Sent letter.      Questions for provider review:    None           Rama Mason MA  Chart routed to Care Team.

## 2024-09-23 ENCOUNTER — MYC REFILL (OUTPATIENT)
Dept: FAMILY MEDICINE | Facility: CLINIC | Age: 44
End: 2024-09-23
Payer: COMMERCIAL

## 2024-09-23 DIAGNOSIS — I10 ESSENTIAL (PRIMARY) HYPERTENSION: ICD-10-CM

## 2024-09-24 RX ORDER — LISINOPRIL 5 MG/1
5 TABLET ORAL DAILY
Qty: 90 TABLET | Refills: 3 | Status: SHIPPED | OUTPATIENT
Start: 2024-09-24

## 2024-09-24 NOTE — TELEPHONE ENCOUNTER
Left message to call back for: Omero  Information to relay to patient: Please see MyChart message and relay.  Need to clarify the 5 mg versus the 10 mg

## 2024-10-09 ENCOUNTER — MYC MEDICAL ADVICE (OUTPATIENT)
Dept: FAMILY MEDICINE | Facility: CLINIC | Age: 44
End: 2024-10-09

## 2024-10-09 ENCOUNTER — MYC REFILL (OUTPATIENT)
Dept: FAMILY MEDICINE | Facility: CLINIC | Age: 44
End: 2024-10-09
Payer: COMMERCIAL

## 2024-10-09 DIAGNOSIS — F19.11 HISTORY OF DRUG ABUSE IN REMISSION (H): ICD-10-CM

## 2024-10-09 DIAGNOSIS — F19.20 DRUG DEPENDENCE ON MAINTENANCE AGONIST THERAPY, NO SYMPTOMS (H): ICD-10-CM

## 2024-10-09 NOTE — TELEPHONE ENCOUNTER
2nd attempted    Patient Quality Outreach    Patient is due for the following:   Diabetes -  A1C, Eye Exam, and Foot Exam  Physical Preventive Adult Physical    Next Steps:   Schedule a Adult Preventative    Type of outreach:    Sent [employer] message.    Next Steps:  Reach out within 90 days via Phone.    Max number of attempts reached: No. Will try again in 90 days if patient still on fail list.    Questions for provider review:    None           Rama Mason MA  Chart routed to Care Team.

## 2024-10-10 RX ORDER — BUPRENORPHINE AND NALOXONE 8; 2 MG/1; MG/1
FILM, SOLUBLE BUCCAL; SUBLINGUAL
Qty: 90 EACH | Refills: 0 | OUTPATIENT
Start: 2024-10-10

## 2024-10-10 RX ORDER — BUPRENORPHINE AND NALOXONE 8; 2 MG/1; MG/1
FILM, SOLUBLE BUCCAL; SUBLINGUAL
Qty: 90 FILM | Refills: 0 | Status: SHIPPED | OUTPATIENT
Start: 2024-10-10 | End: 2024-11-08

## 2024-10-16 ENCOUNTER — TRANSFERRED RECORDS (OUTPATIENT)
Dept: HEALTH INFORMATION MANAGEMENT | Facility: CLINIC | Age: 44
End: 2024-10-16
Payer: COMMERCIAL

## 2024-11-03 ENCOUNTER — HEALTH MAINTENANCE LETTER (OUTPATIENT)
Age: 44
End: 2024-11-03

## 2024-11-04 DIAGNOSIS — E11.65 TYPE 2 DIABETES MELLITUS WITH HYPERGLYCEMIA (H): ICD-10-CM

## 2024-11-04 DIAGNOSIS — E66.01 MORBID (SEVERE) OBESITY DUE TO EXCESS CALORIES (H): ICD-10-CM

## 2024-11-04 RX ORDER — ASPIRIN 81 MG/1
TABLET, COATED ORAL
Qty: 30 TABLET | Refills: 5 | Status: SHIPPED | OUTPATIENT
Start: 2024-11-04

## 2024-11-08 ENCOUNTER — MYC REFILL (OUTPATIENT)
Dept: FAMILY MEDICINE | Facility: CLINIC | Age: 44
End: 2024-11-08
Payer: COMMERCIAL

## 2024-11-08 DIAGNOSIS — F19.11 HISTORY OF DRUG ABUSE IN REMISSION (H): ICD-10-CM

## 2024-11-08 DIAGNOSIS — F19.20 DRUG DEPENDENCE ON MAINTENANCE AGONIST THERAPY, NO SYMPTOMS (H): ICD-10-CM

## 2024-11-08 RX ORDER — BUPRENORPHINE AND NALOXONE 8; 2 MG/1; MG/1
FILM, SOLUBLE BUCCAL; SUBLINGUAL
Qty: 90 FILM | Refills: 0 | Status: SHIPPED | OUTPATIENT
Start: 2024-11-08

## 2024-11-08 NOTE — TELEPHONE ENCOUNTER
Spoke with patient. Scheduled 11/11/24 with Dr. Osman.    Patient reports he will be out of medication tomorrow (11/9).

## 2024-11-08 NOTE — CONFIDENTIAL NOTE
The patient is a couple of months overdue already.  He schedule an appointment in December.  Please ask him to move the appointment up as we should be seeing him 3 months at the longest for Suboxone.

## 2024-11-08 NOTE — TELEPHONE ENCOUNTER
"Patient requesting refill of Suboxone.    Patient due for appointment, per previous note from Dr. Osman (see MyChart and Refill encounters dated 10/9/24).  Called and spoke with patient, informed he is due for an appointment with Dr. Osman; patient voiced understanding and will schedule through [employer] after checking his schedule.    Per last OV notes from 6/25/24:    \"Drug dependence on maintenance agonist therapy, no symptoms (H)        Stable. Continue current medication      Substance abuse: No cravings for alcohol or medications. No side effects from Suboxone.\"          Genevieve Valencia RN  [facility]   "

## 2024-11-08 NOTE — TELEPHONE ENCOUNTER
Left message to call back for: Patient  Information to relay to patient: See provider message and help patient reschedule to sooner date. OK to use reserved slots.

## 2024-11-11 ENCOUNTER — OFFICE VISIT (OUTPATIENT)
Dept: FAMILY MEDICINE | Facility: CLINIC | Age: 44
End: 2024-11-11
Payer: COMMERCIAL

## 2024-11-11 VITALS
OXYGEN SATURATION: 99 % | TEMPERATURE: 98 F | SYSTOLIC BLOOD PRESSURE: 129 MMHG | BODY MASS INDEX: 29.51 KG/M2 | HEART RATE: 86 BPM | HEIGHT: 66 IN | RESPIRATION RATE: 16 BRPM | WEIGHT: 183.6 LBS | DIASTOLIC BLOOD PRESSURE: 82 MMHG

## 2024-11-11 DIAGNOSIS — E88.810 METABOLIC SYNDROME: ICD-10-CM

## 2024-11-11 DIAGNOSIS — E66.3 OVERWEIGHT WITH BODY MASS INDEX (BMI) OF 29 TO 29.9 IN ADULT: ICD-10-CM

## 2024-11-11 DIAGNOSIS — E11.65 TYPE 2 DIABETES MELLITUS WITH HYPERGLYCEMIA, WITHOUT LONG-TERM CURRENT USE OF INSULIN (H): Primary | ICD-10-CM

## 2024-11-11 DIAGNOSIS — I10 ESSENTIAL HYPERTENSION: ICD-10-CM

## 2024-11-11 DIAGNOSIS — F19.20 DRUG DEPENDENCE ON MAINTENANCE AGONIST THERAPY, NO SYMPTOMS (H): ICD-10-CM

## 2024-11-11 DIAGNOSIS — E78.00 HYPERCHOLESTEROLEMIA: ICD-10-CM

## 2024-11-11 DIAGNOSIS — F19.11 HISTORY OF DRUG ABUSE IN REMISSION (H): ICD-10-CM

## 2024-11-11 LAB
AMPHETAMINES UR QL: NOT DETECTED
BARBITURATES UR QL SCN: NOT DETECTED
BENZODIAZ UR QL SCN: NOT DETECTED
BUPRENORPHINE UR QL: DETECTED
CANNABINOIDS UR QL: NOT DETECTED
COCAINE UR QL SCN: NOT DETECTED
D-METHAMPHET UR QL: NOT DETECTED
EST. AVERAGE GLUCOSE BLD GHB EST-MCNC: 108 MG/DL
HBA1C MFR BLD: 5.4 % (ref 0–5.6)
METHADONE UR QL SCN: NOT DETECTED
OPIATES UR QL SCN: NOT DETECTED
OXYCODONE UR QL SCN: NOT DETECTED
PCP UR QL SCN: NOT DETECTED
TRICYCLICS UR QL SCN: NOT DETECTED

## 2024-11-11 PROCEDURE — 36415 COLL VENOUS BLD VENIPUNCTURE: CPT | Performed by: FAMILY MEDICINE

## 2024-11-11 PROCEDURE — 80306 DRUG TEST PRSMV INSTRMNT: CPT | Performed by: FAMILY MEDICINE

## 2024-11-11 PROCEDURE — G2211 COMPLEX E/M VISIT ADD ON: HCPCS | Performed by: FAMILY MEDICINE

## 2024-11-11 PROCEDURE — 83036 HEMOGLOBIN GLYCOSYLATED A1C: CPT | Performed by: FAMILY MEDICINE

## 2024-11-11 PROCEDURE — 99214 OFFICE O/P EST MOD 30 MIN: CPT | Performed by: FAMILY MEDICINE

## 2024-11-11 NOTE — ASSESSMENT & PLAN NOTE
The patient has consistently not used controlled substances in an abusive or legal way.  No side effects from Suboxone.  PDMP reviewed.  Prescription pattern stable.  Continue current therapy.  Recheck with face-to-face visit in clinic in 3 months.

## 2024-11-11 NOTE — ASSESSMENT & PLAN NOTE
Diabetes remains well-controlled.  His nutrition is fragmented and generally of low quality.  We discussed that he needs to focus on improving nutrition and focus on increasing strength.  He is maintaining his weight loss but has probably experienced an overall loss of lean mass.  He is on a statin and an aspirin.  Generally doing better now than he was a year and a half ago but I am concerned that long-term he will start to regain weight despite being on Mounjaro.

## 2024-11-11 NOTE — PROGRESS NOTES
Assessment & Plan   Problem List Items Addressed This Visit       Drug dependence on maintenance agonist therapy, no symptoms (H)     The patient has consistently not used controlled substances in an abusive or legal way.  No side effects from Suboxone.  PDMP reviewed.  Prescription pattern stable.  Continue current therapy.  Recheck with face-to-face visit in clinic in 3 months.         Relevant Orders    U[facility] Drug Screen [facility]    Essential hypertension    Hypercholesterolemia    Metabolic syndrome    Overweight with body mass index (BMI) of 29 to 29.9 in adult    Type 2 diabetes mellitus with hyperglycemia, without long-term current use of insulin (H) - Primary     Diabetes remains well-controlled.  His nutrition is fragmented and generally of low quality.  We discussed that he needs to focus on improving nutrition and focus on increasing strength.  He is maintaining his weight loss but has probably experienced an overall loss of lean mass.  He is on a statin and an aspirin.  Generally doing better now than he was a year and a half ago but I am concerned that long-term he will start to regain weight despite being on Mounjaro.         Relevant Orders    HEMOGLOBIN A1C (Completed)     Other Visit Diagnoses       History of drug abuse in remission (H)        Relevant Orders    Urine Drug Screen Clinic           The longitudinal plan of care for the diagnosis(es)/condition(s) as documented were addressed during this visit. Due to the added complexity in care, I will continue to support Omero in the subsequent management and with ongoing continuity of care.       Violet Jamil is a 44 year old, presenting for the following health issues:  No chief complaint on file.        6/25/2024     9:42 AM   Additional Questions   Roomed by park     DM:  -  appetite increased.  Veggie supplement. Eating more.  Weight up a few pounds from mid summer.  Quality of food is low.  He did not eat yesterday.  Salad and pizza two  "days ago.  Some supplements.     - liquids: no sugar.     History of Present Illness       Reason for visit:  Follow up  - medication    He eats 2-3 servings of fruits and vegetables daily.He exercises with enough effort to increase his heart rate 9 or less minutes per day.  He exercises with enough effort to increase his heart rate 3 or less days per week.   He is taking medications regularly.           Objective    /82 (BP Location: Left arm, Patient Position: Sitting, Cuff Size: Adult Regular)   Pulse 86   Temp 98  F (36.7  C) (Oral)   Resp 16   Ht 1.676 m (5' 6\")   Wt 83.3 kg (183 lb 9.6 oz)   SpO2 99%   BMI 29.63 kg/m    Body mass index is 29.63 kg/m .  Physical Exam  Nursing note reviewed.   Constitutional:       General: He is not in acute distress.     Appearance: Normal appearance. He is not ill-appearing.   HENT:      Head: Normocephalic and atraumatic.   Eyes:      Extraocular Movements: Extraocular movements intact.      Conjunctiva/sclera: Conjunctivae normal.   Pulmonary:      Effort: Pulmonary effort is normal.   Neurological:      Mental Status: He is alert and oriented to person, place, and time.   Psychiatric:         Attention and Perception: Attention normal.         Mood and Affect: Mood normal.         Speech: Speech normal.         Thought Content: Thought content normal.                Signed Electronically by: Demetris Osman MD    "

## 2024-11-13 NOTE — TELEPHONE ENCOUNTER
Patient Quality Outreach    Patient is due for the following:   Diabetes -  A1C, Eye Exam, and Foot Exam  Physical Preventive Adult Physical    Action(s) Taken:   Patient has upcoming appointment, these items will be addressed at that time.  Next appt x 2/3/25  Type of outreach:    Chart review performed, no outreach needed.    Questions for provider review:    None           Rama Mason MA  Chart routed to Care Team.

## 2025-01-28 DIAGNOSIS — E66.01 MORBID (SEVERE) OBESITY DUE TO EXCESS CALORIES (H): ICD-10-CM

## 2025-01-28 DIAGNOSIS — E78.00 HYPERCHOLESTEROLEMIA: ICD-10-CM

## 2025-01-28 DIAGNOSIS — E11.65 TYPE 2 DIABETES MELLITUS WITH HYPERGLYCEMIA, WITHOUT LONG-TERM CURRENT USE OF INSULIN (H): ICD-10-CM

## 2025-01-28 DIAGNOSIS — E11.65 TYPE 2 DIABETES MELLITUS WITH HYPERGLYCEMIA (H): ICD-10-CM

## 2025-01-28 DIAGNOSIS — I10 ESSENTIAL (PRIMARY) HYPERTENSION: ICD-10-CM

## 2025-01-28 RX ORDER — ASPIRIN 81 MG/1
81 TABLET ORAL DAILY
Qty: 90 TABLET | Refills: 2 | Status: SHIPPED | OUTPATIENT
Start: 2025-01-28

## 2025-01-28 RX ORDER — LISINOPRIL 5 MG/1
5 TABLET ORAL DAILY
Qty: 90 TABLET | Refills: 2 | Status: SHIPPED | OUTPATIENT
Start: 2025-01-28

## 2025-01-28 RX ORDER — PRAVASTATIN SODIUM 20 MG
20 TABLET ORAL EVERY EVENING
Qty: 90 TABLET | Refills: 2 | Status: SHIPPED | OUTPATIENT
Start: 2025-01-28

## 2025-03-01 ENCOUNTER — HEALTH MAINTENANCE LETTER (OUTPATIENT)
Age: 45
End: 2025-03-01

## 2025-03-03 ENCOUNTER — MYC REFILL (OUTPATIENT)
Dept: FAMILY MEDICINE | Facility: CLINIC | Age: 45
End: 2025-03-03
Payer: COMMERCIAL

## 2025-03-03 DIAGNOSIS — F19.20 DRUG DEPENDENCE ON MAINTENANCE AGONIST THERAPY, NO SYMPTOMS (H): ICD-10-CM

## 2025-03-03 DIAGNOSIS — F19.11 HISTORY OF DRUG ABUSE IN REMISSION (H): ICD-10-CM

## 2025-03-03 RX ORDER — BUPRENORPHINE AND NALOXONE 8; 2 MG/1; MG/1
FILM, SOLUBLE BUCCAL; SUBLINGUAL
Qty: 90 FILM | Refills: 0 | Status: SHIPPED | OUTPATIENT
Start: 2025-03-03

## 2025-03-25 ENCOUNTER — OFFICE VISIT (OUTPATIENT)
Dept: FAMILY MEDICINE | Facility: CLINIC | Age: 45
End: 2025-03-25
Payer: COMMERCIAL

## 2025-03-25 VITALS
OXYGEN SATURATION: 98 % | DIASTOLIC BLOOD PRESSURE: 84 MMHG | WEIGHT: 205.3 LBS | HEIGHT: 66 IN | TEMPERATURE: 97.9 F | RESPIRATION RATE: 16 BRPM | HEART RATE: 89 BPM | SYSTOLIC BLOOD PRESSURE: 137 MMHG | BODY MASS INDEX: 32.99 KG/M2

## 2025-03-25 DIAGNOSIS — F19.11 HISTORY OF DRUG ABUSE IN REMISSION (H): ICD-10-CM

## 2025-03-25 DIAGNOSIS — E66.09 CLASS 1 OBESITY DUE TO EXCESS CALORIES WITH SERIOUS COMORBIDITY AND BODY MASS INDEX (BMI) OF 33.0 TO 33.9 IN ADULT: ICD-10-CM

## 2025-03-25 DIAGNOSIS — E66.811 CLASS 1 OBESITY DUE TO EXCESS CALORIES WITH SERIOUS COMORBIDITY AND BODY MASS INDEX (BMI) OF 33.0 TO 33.9 IN ADULT: ICD-10-CM

## 2025-03-25 DIAGNOSIS — I10 ESSENTIAL HYPERTENSION: ICD-10-CM

## 2025-03-25 DIAGNOSIS — F10.21 ALCOHOL DEPENDENCE IN REMISSION (H): ICD-10-CM

## 2025-03-25 DIAGNOSIS — F19.20 DRUG DEPENDENCE ON MAINTENANCE AGONIST THERAPY, NO SYMPTOMS (H): ICD-10-CM

## 2025-03-25 DIAGNOSIS — E11.65 TYPE 2 DIABETES MELLITUS WITH HYPERGLYCEMIA, WITHOUT LONG-TERM CURRENT USE OF INSULIN (H): Primary | ICD-10-CM

## 2025-03-25 LAB
AMPHETAMINES UR QL: NOT DETECTED
BARBITURATES UR QL SCN: NOT DETECTED
BENZODIAZ UR QL SCN: NOT DETECTED
BUPRENORPHINE UR QL: DETECTED
CANNABINOIDS UR QL: NOT DETECTED
COCAINE UR QL SCN: NOT DETECTED
D-METHAMPHET UR QL: NOT DETECTED
METHADONE UR QL SCN: NOT DETECTED
OPIATES UR QL SCN: NOT DETECTED
OXYCODONE UR QL SCN: NOT DETECTED
PCP UR QL SCN: NOT DETECTED
TRICYCLICS UR QL SCN: NOT DETECTED

## 2025-03-25 PROCEDURE — 99214 OFFICE O/P EST MOD 30 MIN: CPT | Performed by: FAMILY MEDICINE

## 2025-03-25 PROCEDURE — 3078F DIAST BP <80 MM HG: CPT | Performed by: FAMILY MEDICINE

## 2025-03-25 PROCEDURE — G2211 COMPLEX E/M VISIT ADD ON: HCPCS | Performed by: FAMILY MEDICINE

## 2025-03-25 PROCEDURE — 80306 DRUG TEST PRSMV INSTRMNT: CPT | Performed by: FAMILY MEDICINE

## 2025-03-25 PROCEDURE — 3075F SYST BP GE 130 - 139MM HG: CPT | Performed by: FAMILY MEDICINE

## 2025-03-25 RX ORDER — BUPRENORPHINE AND NALOXONE 8; 2 MG/1; MG/1
FILM, SOLUBLE BUCCAL; SUBLINGUAL
Qty: 90 FILM | Refills: 0 | Status: SHIPPED | OUTPATIENT
Start: 2025-03-25

## 2025-03-25 NOTE — ASSESSMENT & PLAN NOTE
We reviewed the effects of tirzepatide (Mounjaro).  He has been gaining weight primarily through increased cravings for sugar.  He initially lost weight on this medicine based on the appetite suppression qualities.  It is too early to recheck a hemoglobin A1c.  Will plan to check in the near future.

## 2025-03-25 NOTE — PROGRESS NOTES
"  Assessment & Plan   Problem List Items Addressed This Visit       Alcohol dependence in remission (H)     Not an issue.          Class 1 obesity due to excess calories with serious comorbidity and body mass index (BMI) of 33.0 to 33.9 in adult     Discussed in the context of diabetes mellitus.          Relevant Medications    tirzepatide (MOUNJARO) 15 MG/0.5ML SOAJ auto-injector pen    Drug dependence on maintenance agonist therapy, no symptoms (H)     Doing well.  We reviewed reports of dentition with suboxone. Not a concern for him.  Utox today.  Continue suboxone.          Relevant Medications    buprenorphine HCl-naloxone HCl (SUBOXONE) 8-2 MG per film    Essential hypertension     Blood pressure remains within target range.         Type 2 diabetes mellitus with hyperglycemia, without long-term current use of insulin (H) - Primary     We reviewed the effects of tirzepatide (Mounjaro).  He has been gaining weight primarily through increased cravings for sugar.  He initially lost weight on this medicine based on the appetite suppression qualities.  It is too early to recheck a hemoglobin A1c.  Will plan to check in the near future.         Relevant Medications    tirzepatide (MOUNJARO) 15 MG/0.5ML SOAJ auto-injector pen     Other Visit Diagnoses       History of drug abuse in remission (H)        Relevant Medications    buprenorphine HCl-naloxone HCl (SUBOXONE) 8-2 MG per film           BMI  Estimated body mass index is 33.14 kg/m  as calculated from the following:    Height as of this encounter: 1.676 m (5' 6\").    Weight as of this encounter: 93.1 kg (205 lb 4.8 oz).   Weight management plan: Discussed healthy diet and exercise guidelines    The longitudinal plan of care for the diagnosis(es)/condition(s) as documented were addressed during this visit. Due to the added complexity in care, I will continue to support Omero in the subsequent management and with ongoing continuity of care.      Subjective   Omero " "is a 44 year old, presenting for the following health issues:  RECHECK (3 month follow-up )        3/25/2025    10:37 AM   Additional Questions   Roomed by xl     History of Present Illness       Reason for visit:  Na   He is taking medications regularly.          Objective    /84 (BP Location: Left arm, Patient Position: Sitting, Cuff Size: Adult Regular)   Pulse 89   Temp 97.9  F (36.6  C) (Oral)   Resp 16   Ht 1.676 m (5' 6\")   Wt 93.1 kg (205 lb 4.8 oz)   SpO2 98%   BMI 33.14 kg/m    Body mass index is 33.14 kg/m .  Physical Exam  Nursing note reviewed.   Constitutional:       General: He is not in acute distress.     Appearance: Normal appearance. He is not ill-appearing.   HENT:      Head: Normocephalic and atraumatic.   Eyes:      Extraocular Movements: Extraocular movements intact.      Conjunctiva/sclera: Conjunctivae normal.   Pulmonary:      Effort: Pulmonary effort is normal.   Neurological:      Mental Status: He is alert and oriented to person, place, and time.   Psychiatric:         Attention and Perception: Attention normal.         Mood and Affect: Mood normal.         Speech: Speech normal.         Thought Content: Thought content normal.                    Signed Electronically by: Demetris Osman MD    "

## 2025-03-25 NOTE — ASSESSMENT & PLAN NOTE
Doing well.  We reviewed reports of dentition with suboxone. Not a concern for him.  Utox today.  Continue suboxone.

## 2025-04-28 ENCOUNTER — MYC REFILL (OUTPATIENT)
Dept: FAMILY MEDICINE | Facility: CLINIC | Age: 45
End: 2025-04-28
Payer: COMMERCIAL

## 2025-04-28 DIAGNOSIS — F19.11 HISTORY OF DRUG ABUSE IN REMISSION (H): ICD-10-CM

## 2025-04-28 DIAGNOSIS — F19.20 DRUG DEPENDENCE ON MAINTENANCE AGONIST THERAPY, NO SYMPTOMS (H): ICD-10-CM

## 2025-04-28 RX ORDER — BUPRENORPHINE AND NALOXONE 8; 2 MG/1; MG/1
FILM, SOLUBLE BUCCAL; SUBLINGUAL
Qty: 90 FILM | Refills: 0 | Status: SHIPPED | OUTPATIENT
Start: 2025-04-28

## 2025-05-27 ENCOUNTER — MYC REFILL (OUTPATIENT)
Dept: FAMILY MEDICINE | Facility: CLINIC | Age: 45
End: 2025-05-27
Payer: COMMERCIAL

## 2025-05-27 DIAGNOSIS — F19.11 HISTORY OF DRUG ABUSE IN REMISSION (H): ICD-10-CM

## 2025-05-27 DIAGNOSIS — F19.20 DRUG DEPENDENCE ON MAINTENANCE AGONIST THERAPY, NO SYMPTOMS (H): ICD-10-CM

## 2025-05-27 RX ORDER — BUPRENORPHINE AND NALOXONE 8; 2 MG/1; MG/1
FILM, SOLUBLE BUCCAL; SUBLINGUAL
Qty: 90 FILM | Refills: 0 | Status: SHIPPED | OUTPATIENT
Start: 2025-05-27

## 2025-06-21 ENCOUNTER — HEALTH MAINTENANCE LETTER (OUTPATIENT)
Age: 45
End: 2025-06-21

## 2025-06-21 ENCOUNTER — MYC REFILL (OUTPATIENT)
Dept: FAMILY MEDICINE | Facility: CLINIC | Age: 45
End: 2025-06-21
Payer: COMMERCIAL

## 2025-06-21 DIAGNOSIS — E11.65 TYPE 2 DIABETES MELLITUS WITH HYPERGLYCEMIA, WITHOUT LONG-TERM CURRENT USE OF INSULIN (H): ICD-10-CM

## 2025-06-21 DIAGNOSIS — F19.20 DRUG DEPENDENCE ON MAINTENANCE AGONIST THERAPY, NO SYMPTOMS (H): ICD-10-CM

## 2025-06-21 DIAGNOSIS — F19.11 HISTORY OF DRUG ABUSE IN REMISSION (H): ICD-10-CM

## 2025-06-22 RX ORDER — BUPRENORPHINE AND NALOXONE 8; 2 MG/1; MG/1
FILM, SOLUBLE BUCCAL; SUBLINGUAL
Qty: 90 FILM | Refills: 0 | Status: SHIPPED | OUTPATIENT
Start: 2025-06-22

## 2025-07-07 ENCOUNTER — MYC MEDICAL ADVICE (OUTPATIENT)
Dept: OTOLARYNGOLOGY | Facility: CLINIC | Age: 45
End: 2025-07-07
Payer: COMMERCIAL

## 2025-07-07 DIAGNOSIS — E11.65 TYPE 2 DIABETES MELLITUS WITH HYPERGLYCEMIA, WITHOUT LONG-TERM CURRENT USE OF INSULIN (H): ICD-10-CM

## 2025-07-18 ENCOUNTER — MYC REFILL (OUTPATIENT)
Dept: FAMILY MEDICINE | Facility: CLINIC | Age: 45
End: 2025-07-18
Payer: COMMERCIAL

## 2025-07-18 DIAGNOSIS — F19.11 HISTORY OF DRUG ABUSE IN REMISSION (H): ICD-10-CM

## 2025-07-18 DIAGNOSIS — F19.20 DRUG DEPENDENCE ON MAINTENANCE AGONIST THERAPY, NO SYMPTOMS (H): ICD-10-CM

## 2025-07-18 RX ORDER — BUPRENORPHINE AND NALOXONE 8; 2 MG/1; MG/1
FILM, SOLUBLE BUCCAL; SUBLINGUAL
Qty: 90 FILM | Refills: 0 | Status: CANCELLED | OUTPATIENT
Start: 2025-07-18

## 2025-07-20 RX ORDER — BUPRENORPHINE AND NALOXONE 12; 3 MG/1; MG/1
1 FILM, SOLUBLE BUCCAL; SUBLINGUAL 2 TIMES DAILY
Qty: 56 FILM | Refills: 0 | Status: SHIPPED | OUTPATIENT
Start: 2025-07-20

## 2025-07-21 NOTE — TELEPHONE ENCOUNTER
Left message to call back for: Patient  Information to relay to patient: See provider message below and help patient schedule. OK to use reserved slots to be seen in the next month.

## 2025-07-23 ENCOUNTER — MYC MEDICAL ADVICE (OUTPATIENT)
Dept: FAMILY MEDICINE | Facility: CLINIC | Age: 45
End: 2025-07-23
Payer: COMMERCIAL

## 2025-07-23 ENCOUNTER — PATIENT OUTREACH (OUTPATIENT)
Dept: CARE COORDINATION | Facility: CLINIC | Age: 45
End: 2025-07-23
Payer: COMMERCIAL

## 2025-07-30 NOTE — TELEPHONE ENCOUNTER
Pt returned missed calls about appt; pt has been out of town.    Pt was assisted in scheduling Med Check appt.

## 2025-08-11 ENCOUNTER — TELEPHONE (OUTPATIENT)
Dept: FAMILY MEDICINE | Facility: CLINIC | Age: 45
End: 2025-08-11

## 2025-08-11 ENCOUNTER — OFFICE VISIT (OUTPATIENT)
Dept: FAMILY MEDICINE | Facility: CLINIC | Age: 45
End: 2025-08-11
Payer: COMMERCIAL

## 2025-08-11 VITALS
RESPIRATION RATE: 16 BRPM | HEART RATE: 81 BPM | TEMPERATURE: 97.8 F | BODY MASS INDEX: 29.64 KG/M2 | WEIGHT: 195.6 LBS | HEIGHT: 68 IN | SYSTOLIC BLOOD PRESSURE: 138 MMHG | OXYGEN SATURATION: 98 % | DIASTOLIC BLOOD PRESSURE: 84 MMHG

## 2025-08-11 DIAGNOSIS — E66.3 OVERWEIGHT WITH BODY MASS INDEX (BMI) OF 29 TO 29.9 IN ADULT: ICD-10-CM

## 2025-08-11 DIAGNOSIS — F19.11 HISTORY OF DRUG ABUSE IN REMISSION (H): ICD-10-CM

## 2025-08-11 DIAGNOSIS — I10 ESSENTIAL HYPERTENSION: ICD-10-CM

## 2025-08-11 DIAGNOSIS — E11.65 TYPE 2 DIABETES MELLITUS WITH HYPERGLYCEMIA, WITHOUT LONG-TERM CURRENT USE OF INSULIN (H): ICD-10-CM

## 2025-08-11 DIAGNOSIS — E78.00 HYPERCHOLESTEROLEMIA: ICD-10-CM

## 2025-08-11 DIAGNOSIS — F19.20 DRUG DEPENDENCE ON MAINTENANCE AGONIST THERAPY, NO SYMPTOMS (H): Primary | ICD-10-CM

## 2025-08-11 DIAGNOSIS — E88.810 METABOLIC SYNDROME: ICD-10-CM

## 2025-08-11 LAB
AMPHETAMINES UR QL: NOT DETECTED
BARBITURATES UR QL SCN: NOT DETECTED
BENZODIAZ UR QL SCN: NOT DETECTED
BUPRENORPHINE UR QL: DETECTED
CANNABINOIDS UR QL: NOT DETECTED
COCAINE UR QL SCN: NOT DETECTED
D-METHAMPHET UR QL: NOT DETECTED
EST. AVERAGE GLUCOSE BLD GHB EST-MCNC: 123 MG/DL
HBA1C MFR BLD: 5.9 % (ref 0–5.6)
HOLD SPECIMEN: NORMAL
METHADONE UR QL SCN: NOT DETECTED
OPIATES UR QL SCN: NOT DETECTED
OXYCODONE UR QL SCN: NOT DETECTED
PCP UR QL SCN: NOT DETECTED
TRICYCLICS UR QL SCN: NOT DETECTED

## 2025-08-11 PROCEDURE — 80048 BASIC METABOLIC PNL TOTAL CA: CPT | Performed by: FAMILY MEDICINE

## 2025-08-11 PROCEDURE — 3044F HG A1C LEVEL LT 7.0%: CPT | Performed by: FAMILY MEDICINE

## 2025-08-11 PROCEDURE — 99214 OFFICE O/P EST MOD 30 MIN: CPT | Performed by: FAMILY MEDICINE

## 2025-08-11 PROCEDURE — 1126F AMNT PAIN NOTED NONE PRSNT: CPT | Performed by: FAMILY MEDICINE

## 2025-08-11 PROCEDURE — 3079F DIAST BP 80-89 MM HG: CPT | Performed by: FAMILY MEDICINE

## 2025-08-11 PROCEDURE — 83036 HEMOGLOBIN GLYCOSYLATED A1C: CPT | Performed by: FAMILY MEDICINE

## 2025-08-11 PROCEDURE — 3075F SYST BP GE 130 - 139MM HG: CPT | Performed by: FAMILY MEDICINE

## 2025-08-11 PROCEDURE — G2211 COMPLEX E/M VISIT ADD ON: HCPCS | Performed by: FAMILY MEDICINE

## 2025-08-11 PROCEDURE — 36415 COLL VENOUS BLD VENIPUNCTURE: CPT | Performed by: FAMILY MEDICINE

## 2025-08-11 PROCEDURE — 80306 DRUG TEST PRSMV INSTRMNT: CPT | Performed by: FAMILY MEDICINE

## 2025-08-11 RX ORDER — BUPRENORPHINE AND NALOXONE 8; 2 MG/1; MG/1
1 FILM, SOLUBLE BUCCAL; SUBLINGUAL 3 TIMES DAILY
Qty: 90 FILM | Refills: 0 | Status: SHIPPED | OUTPATIENT
Start: 2025-08-11

## 2025-08-11 ASSESSMENT — PAIN SCALES - GENERAL: PAINLEVEL_OUTOF10: NO PAIN (0)

## 2025-08-12 LAB
ANION GAP SERPL CALCULATED.3IONS-SCNC: 13 MMOL/L (ref 7–15)
BUN SERPL-MCNC: 9.4 MG/DL (ref 6–20)
CALCIUM SERPL-MCNC: 9.1 MG/DL (ref 8.8–10.4)
CHLORIDE SERPL-SCNC: 105 MMOL/L (ref 98–107)
CREAT SERPL-MCNC: 0.86 MG/DL (ref 0.67–1.17)
EGFRCR SERPLBLD CKD-EPI 2021: >90 ML/MIN/1.73M2
GLUCOSE SERPL-MCNC: 134 MG/DL (ref 70–99)
HCO3 SERPL-SCNC: 22 MMOL/L (ref 22–29)
POTASSIUM SERPL-SCNC: 3.9 MMOL/L (ref 3.4–5.3)
SODIUM SERPL-SCNC: 140 MMOL/L (ref 135–145)